# Patient Record
Sex: MALE | Race: WHITE | NOT HISPANIC OR LATINO | Employment: OTHER | URBAN - NONMETROPOLITAN AREA
[De-identification: names, ages, dates, MRNs, and addresses within clinical notes are randomized per-mention and may not be internally consistent; named-entity substitution may affect disease eponyms.]

---

## 2017-01-04 DIAGNOSIS — I10 ESSENTIAL HYPERTENSION: ICD-10-CM

## 2017-01-04 RX ORDER — LISINOPRIL 40 MG/1
40 TABLET ORAL 2 TIMES DAILY
Qty: 60 TABLET | Refills: 0 | Status: SHIPPED | OUTPATIENT
Start: 2017-01-04 | End: 2020-04-27

## 2017-01-04 RX ORDER — AMLODIPINE BESYLATE 10 MG/1
10 TABLET ORAL DAILY
Qty: 30 TABLET | Refills: 0 | Status: SHIPPED | OUTPATIENT
Start: 2017-01-04

## 2017-01-04 RX ORDER — HYDROCHLOROTHIAZIDE 25 MG/1
25 TABLET ORAL DAILY
Qty: 30 TABLET | Refills: 0 | Status: SHIPPED | OUTPATIENT
Start: 2017-01-04

## 2017-01-05 ENCOUNTER — TELEPHONE (OUTPATIENT)
Dept: FAMILY MEDICINE CLINIC | Facility: CLINIC | Age: 52
End: 2017-01-05

## 2017-01-05 NOTE — TELEPHONE ENCOUNTER
Top number too high.  Tell him to limit salt and recheck 2 weeks. If top number still over 140, needs to be seen.

## 2018-09-26 ENCOUNTER — OFFICE VISIT (OUTPATIENT)
Dept: ORTHOPEDIC SURGERY | Facility: CLINIC | Age: 53
End: 2018-09-26

## 2018-09-26 ENCOUNTER — LAB (OUTPATIENT)
Dept: LAB | Facility: HOSPITAL | Age: 53
End: 2018-09-26

## 2018-09-26 VITALS — BODY MASS INDEX: 41.75 KG/M2 | WEIGHT: 315 LBS | HEIGHT: 73 IN

## 2018-09-26 DIAGNOSIS — M25.561 RIGHT KNEE PAIN, UNSPECIFIED CHRONICITY: Primary | ICD-10-CM

## 2018-09-26 DIAGNOSIS — M25.561 RIGHT KNEE PAIN, UNSPECIFIED CHRONICITY: ICD-10-CM

## 2018-09-26 DIAGNOSIS — M25.561 ACUTE PAIN OF RIGHT KNEE: ICD-10-CM

## 2018-09-26 DIAGNOSIS — M25.461 EFFUSION OF RIGHT KNEE: Primary | ICD-10-CM

## 2018-09-26 LAB — CRYSTALS FLD MICRO: NORMAL

## 2018-09-26 PROCEDURE — 20610 DRAIN/INJ JOINT/BURSA W/O US: CPT | Performed by: NURSE PRACTITIONER

## 2018-09-26 PROCEDURE — 87205 SMEAR GRAM STAIN: CPT | Performed by: NURSE PRACTITIONER

## 2018-09-26 PROCEDURE — 87070 CULTURE OTHR SPECIMN AEROBIC: CPT | Performed by: NURSE PRACTITIONER

## 2018-09-26 PROCEDURE — 89060 EXAM SYNOVIAL FLUID CRYSTALS: CPT | Performed by: NURSE PRACTITIONER

## 2018-09-26 PROCEDURE — 87015 SPECIMEN INFECT AGNT CONCNTJ: CPT | Performed by: NURSE PRACTITIONER

## 2018-09-26 PROCEDURE — 99214 OFFICE O/P EST MOD 30 MIN: CPT | Performed by: NURSE PRACTITIONER

## 2018-09-26 RX ADMIN — TRIAMCINOLONE ACETONIDE 40 MG: 40 INJECTION, SUSPENSION INTRA-ARTICULAR; INTRAMUSCULAR at 14:51

## 2018-09-26 RX ADMIN — LIDOCAINE HYDROCHLORIDE 2 ML: 20 INJECTION, SOLUTION INFILTRATION; PERINEURAL at 14:51

## 2018-09-26 NOTE — PROGRESS NOTES
Kurtis Valladares is a 52 y.o. male   Primary provider:  System, Provider Not In       Chief Complaint   Patient presents with   • Right Knee - Pain   • Establish Care       HISTORY OF PRESENT ILLNESS: Patient being seen for right knee pain. X-rays done today. Patient reports no pain when sitting, however pain ranges from 6-9/10 with ambulation.     Pain   The problem occurs constantly. Associated symptoms include joint swelling. Associated symptoms comments: Grinding, stabbing, aching, clicking, swelling. The symptoms are aggravated by standing and walking. He has tried rest for the symptoms.        CONCURRENT MEDICAL HISTORY:    Past Medical History:   Diagnosis Date   • Acute bronchitis    • Asthma    • Dyspnea    • Hypertension    • Pain in lower limb    • Sprain of ankle        No Known Allergies      Current Outpatient Prescriptions:   •  amLODIPine (NORVASC) 10 MG tablet, Take 1 tablet by mouth Daily., Disp: 30 tablet, Rfl: 0  •  DICLOFENAC PO, Take  by mouth., Disp: , Rfl:   •  hydrochlorothiazide (HYDRODIURIL) 25 MG tablet, Take 1 tablet by mouth Daily., Disp: 30 tablet, Rfl: 0  •  lisinopril (PRINIVIL,ZESTRIL) 40 MG tablet, Take 1 tablet by mouth 2 (Two) Times a Day., Disp: 60 tablet, Rfl: 0    Past Surgical History:   Procedure Laterality Date   • OTHER SURGICAL HISTORY  06/18/2005    Arm Surgery (1) -  removal. foreign body right antecubital fossa, penetrating wound       Family History   Problem Relation Age of Onset   • Hypertension Other        Social History     Social History   • Marital status:      Spouse name: N/A   • Number of children: N/A   • Years of education: N/A     Occupational History   • Not on file.     Social History Main Topics   • Smoking status: Former Smoker     Quit date: 12/20/2005   • Smokeless tobacco: Never Used   • Alcohol use 1.2 oz/week     2 Cans of beer per week   • Drug use: No   • Sexual activity: Defer     Other Topics Concern   • Not on file     Social  "History Narrative   • No narrative on file        Review of Systems   Musculoskeletal: Positive for joint swelling.   All other systems reviewed and are negative.      PHYSICAL EXAMINATION:       Ht 185.4 cm (73\")   Wt (!) 161 kg (354 lb)   BMI 46.70 kg/m²     Physical Exam   Constitutional: He is oriented to person, place, and time. Vital signs are normal. He appears well-developed and well-nourished. He is cooperative.   HENT:   Head: Normocephalic and atraumatic.   Neck: Trachea normal and phonation normal.   Pulmonary/Chest: Effort normal. No respiratory distress.   Abdominal: Soft. Normal appearance. He exhibits no distension.   Musculoskeletal:        Right knee: He exhibits effusion.   Neurological: He is alert and oriented to person, place, and time. GCS eye subscore is 4. GCS verbal subscore is 5. GCS motor subscore is 6.   Skin: Skin is warm, dry and intact.   Psychiatric: He has a normal mood and affect. His speech is normal and behavior is normal. Judgment and thought content normal. Cognition and memory are normal.   Vitals reviewed.      GAIT:     []  Normal  [x]  Antalgic    Assistive device: [x]  None  []  Walker     []  Crutches  []  Cane     []  Wheelchair  []  Stretcher    Right Knee Exam     Tenderness   The patient is experiencing tenderness in the medial joint line and lateral joint line.    Range of Motion   Right knee extension: -3.   Right knee flexion: 90.     Other   Erythema: absent  Scars: absent  Sensation: normal  Pulse: present  Swelling: moderate  Other tests: effusion present      Left Knee Exam   Left knee exam is normal.    Muscle Strength     The patient has normal left knee strength.                Large Joint Arthrocentesis  Date/Time: 9/26/2018 2:51 PM  Consent given by: patient  Site marked: site marked  Timeout: Immediately prior to procedure a time out was called to verify the correct patient, procedure, equipment, support staff and site/side marked as required "   Supporting Documentation  Indications: pain   Procedure Details  Location: knee - R knee  Preparation: Patient was prepped and draped in the usual sterile fashion  Needle size: 22 G  Approach: anteromedial  Medications administered: 40 mg triamcinolone acetonide 40 MG/ML; 2 mL lidocaine 2%  Patient tolerance: patient tolerated the procedure well with no immediate complications          Xr Knee 1 Or 2 View Right    Result Date: 9/26/2018  Narrative: Standing AP of both knees with lateral views of the right knee only reveals no fracture dislocation or other acute radiological abnormality noted.  There is moderate to severe amount of medial compartmental degenerative changes noted in the medial compartment.  No comparison views on file. 09/26/18 at 3:30 PM by JESENIA Flannery    Xr Knee Bilateral Ap Standing    Result Date: 9/26/2018  Narrative: Standing AP of both knees with lateral views of the right knee only reveals no fracture dislocation or other acute radiological abnormality noted.  There is moderate to severe amount of medial compartmental degenerative changes noted in the medial compartment.  No comparison views on file. 09/26/18 at 3:30 PM by JESENIA Flannery           ASSESSMENT:    Diagnoses and all orders for this visit:    Effusion of right knee    Right knee pain, unspecified chronicity  -     Large Joint Arthrocentesis  -     Culture, Routine - Aspirate, Knee, Right; Future  -     Crystal Exam, Fluid - Synovial Fluid,; Future  -     Crystal Exam, Fluid - Synovial Fluid,  -     Body Fluid Culture - Aspirate, Knee, Right; Future  -     Cancel: Body Fluid Culture - Aspirate, Knee, Right  -     Body Fluid Culture - Body Fluid, Knee, Right    Other orders  -     DICLOFENAC PO; Take  by mouth.          PLAN  Reviewed the x-rays with the patient and discussed options recommending aspiration of the knee.  Fluid was sent off for an evaluation of a shot of steroids was placed in the knee prior to  discharge.  Patient was instructed modifies activity weightbearing and follow-up in 1 week for recheck unless his pain has dramatically improved and he cut back as needed.  No Follow-up on file.    Bill Pruitt, APRN

## 2018-09-27 RX ORDER — LIDOCAINE HYDROCHLORIDE 20 MG/ML
2 INJECTION, SOLUTION INFILTRATION; PERINEURAL
Status: COMPLETED | OUTPATIENT
Start: 2018-09-26 | End: 2018-09-26

## 2018-09-27 RX ORDER — TRIAMCINOLONE ACETONIDE 40 MG/ML
40 INJECTION, SUSPENSION INTRA-ARTICULAR; INTRAMUSCULAR
Status: COMPLETED | OUTPATIENT
Start: 2018-09-26 | End: 2018-09-26

## 2018-10-03 ENCOUNTER — OFFICE VISIT (OUTPATIENT)
Dept: ORTHOPEDIC SURGERY | Facility: CLINIC | Age: 53
End: 2018-10-03

## 2018-10-03 VITALS — HEIGHT: 73 IN | BODY MASS INDEX: 41.75 KG/M2 | WEIGHT: 315 LBS

## 2018-10-03 DIAGNOSIS — M25.461 EFFUSION OF RIGHT KNEE: Primary | ICD-10-CM

## 2018-10-03 DIAGNOSIS — M25.561 RIGHT KNEE PAIN, UNSPECIFIED CHRONICITY: ICD-10-CM

## 2018-10-03 PROCEDURE — 99213 OFFICE O/P EST LOW 20 MIN: CPT | Performed by: NURSE PRACTITIONER

## 2018-10-03 NOTE — PROGRESS NOTES
"Kurtis Valladares is a 52 y.o. male returns for     Chief Complaint   Patient presents with   • Right Knee - Follow-up       HISTORY OF PRESENT ILLNESS: Patient being seen for right knee follow up. Patient states pain has increased and is disturbing sleep.        CONCURRENT MEDICAL HISTORY:    The following portions of the patient's history were reviewed and updated as appropriate: allergies, current medications, past family history, past medical history, past social history, past surgical history and problem list.     ROS  No fevers or chills.  No chest pain or shortness of air.  No GI or  disturbances.    PHYSICAL EXAMINATION:       Ht 185.4 cm (73\")   Wt (!) 159 kg (351 lb 3.2 oz)   BMI 46.34 kg/m²     Physical Exam   Constitutional: He is oriented to person, place, and time. Vital signs are normal. He appears well-developed and well-nourished. He is cooperative.   HENT:   Head: Normocephalic and atraumatic.   Neck: Trachea normal and phonation normal.   Pulmonary/Chest: Effort normal. No respiratory distress.   Abdominal: Soft. Normal appearance. He exhibits no distension.   Musculoskeletal:        Right knee: He exhibits effusion.   Neurological: He is alert and oriented to person, place, and time. GCS eye subscore is 4. GCS verbal subscore is 5. GCS motor subscore is 6.   Skin: Skin is warm, dry and intact.   Psychiatric: He has a normal mood and affect. His speech is normal and behavior is normal. Judgment and thought content normal. Cognition and memory are normal.   Vitals reviewed.      GAIT:     [x]  Normal  []  Antalgic    Assistive device: [x]  None  []  Walker     []  Crutches  []  Cane     []  Wheelchair  []  Stretcher    Right Knee Exam     Tenderness   The patient is experiencing tenderness in the medial joint line and pes anserinus.    Range of Motion   Extension: normal   Right knee flexion: 90.     Tests   Sofiya:  Medial - positive Lateral - positive    Other   Erythema: absent  Scars: " absent  Sensation: normal  Pulse: present  Swelling: mild  Other tests: effusion present      Left Knee Exam   Left knee exam is normal.    Muscle Strength     The patient has normal left knee strength.              Xr Knee 1 Or 2 View Right    Result Date: 9/26/2018  Narrative: Standing AP of both knees with lateral views of the right knee only reveals no fracture dislocation or other acute radiological abnormality noted.  There is moderate to severe amount of medial compartmental degenerative changes noted in the medial compartment.  No comparison views on file. 09/26/18 at 3:30 PM by JESENIA Flannery    Xr Knee Bilateral Ap Standing    Result Date: 9/26/2018  Narrative: Standing AP of both knees with lateral views of the right knee only reveals no fracture dislocation or other acute radiological abnormality noted.  There is moderate to severe amount of medial compartmental degenerative changes noted in the medial compartment.  No comparison views on file. 09/26/18 at 3:30 PM by JESENIA Flannery             ASSESSMENT:    Diagnoses and all orders for this visit:    Effusion of right knee  -     MRI Knee Right Without Contrast; Future    Right knee pain, unspecified chronicity  -     MRI Knee Right Without Contrast; Future          PLAN  Recommend MRI of the right knee for further evaluation pain which is failed to improve despite aspiration and injection 2 weeks ago.  No Follow-up on file.    JESENIA Flannery

## 2018-10-04 DIAGNOSIS — M25.461 EFFUSION OF RIGHT KNEE: ICD-10-CM

## 2018-10-04 DIAGNOSIS — M25.561 RIGHT KNEE PAIN, UNSPECIFIED CHRONICITY: ICD-10-CM

## 2018-10-05 ENCOUNTER — OFFICE VISIT (OUTPATIENT)
Dept: ORTHOPEDIC SURGERY | Facility: CLINIC | Age: 53
End: 2018-10-05

## 2018-10-05 VITALS — HEIGHT: 73 IN | WEIGHT: 315 LBS | BODY MASS INDEX: 41.75 KG/M2

## 2018-10-05 DIAGNOSIS — M25.561 RIGHT KNEE PAIN, UNSPECIFIED CHRONICITY: ICD-10-CM

## 2018-10-05 DIAGNOSIS — M17.11 PRIMARY OSTEOARTHRITIS OF RIGHT KNEE: ICD-10-CM

## 2018-10-05 DIAGNOSIS — T14.8XXA BONE BRUISE: Primary | ICD-10-CM

## 2018-10-05 PROCEDURE — 99213 OFFICE O/P EST LOW 20 MIN: CPT | Performed by: NURSE PRACTITIONER

## 2018-10-05 NOTE — PROGRESS NOTES
"Kurtis Valladares is a 52 y.o. male returns for     Chief Complaint   Patient presents with   • Right Knee - Follow-up, Pain   • Results       HISTORY OF PRESENT ILLNESS:     52-year-old  male in the office today for follow-up evaluation of his right knee pain.  He's obtain MRI as directed and he reports her some improvement in the pain when he doubled his dose of diclofenac.     CONCURRENT MEDICAL HISTORY:    The following portions of the patient's history were reviewed and updated as appropriate: allergies, current medications, past family history, past medical history, past social history, past surgical history and problem list.     ROS  No fevers or chills.  No chest pain or shortness of air.  No GI or  disturbances.    PHYSICAL EXAMINATION:       Ht 185.4 cm (73\")   Wt (!) 159 kg (350 lb)   BMI 46.18 kg/m²     Physical Exam   Constitutional: He is oriented to person, place, and time. Vital signs are normal. He appears well-developed and well-nourished. He is cooperative.   HENT:   Head: Normocephalic and atraumatic.   Neck: Trachea normal and phonation normal.   Pulmonary/Chest: Effort normal. No respiratory distress.   Abdominal: Soft. Normal appearance. He exhibits no distension.   Musculoskeletal:        Right knee: He exhibits effusion.   Neurological: He is alert and oriented to person, place, and time. GCS eye subscore is 4. GCS verbal subscore is 5. GCS motor subscore is 6.   Skin: Skin is warm, dry and intact.   Psychiatric: He has a normal mood and affect. His speech is normal and behavior is normal. Judgment and thought content normal. Cognition and memory are normal.   Vitals reviewed.      GAIT:     []  Normal  [x]  Antalgic    Assistive device: [x]  None  []  Walker     []  Crutches  []  Cane     []  Wheelchair  []  Stretcher    Right Knee Exam     Tenderness   The patient is experiencing tenderness in the medial joint line and pes anserinus.    Range of Motion   Extension: normal "   Right knee flexion: 90.     Tests   Sofiya:  Medial - positive Lateral - positive    Other   Erythema: absent  Scars: absent  Sensation: normal  Pulse: present  Swelling: mild  Other tests: effusion present      Left Knee Exam   Left knee exam is normal.    Muscle Strength     The patient has normal left knee strength.              Xr Knee 1 Or 2 View Right    Result Date: 9/26/2018  Narrative: Standing AP of both knees with lateral views of the right knee only reveals no fracture dislocation or other acute radiological abnormality noted.  There is moderate to severe amount of medial compartmental degenerative changes noted in the medial compartment.  No comparison views on file. 09/26/18 at 3:30 PM by JESENIA Flannery    Xr Knee Bilateral Ap Standing    Result Date: 9/26/2018  Narrative: Standing AP of both knees with lateral views of the right knee only reveals no fracture dislocation or other acute radiological abnormality noted.  There is moderate to severe amount of medial compartmental degenerative changes noted in the medial compartment.  No comparison views on file. 09/26/18 at 3:30 PM by JESENIA Flannery             MRI knee right without [id number]  [facility]  Result Impression         Findings of advanced chronic osteoarthritis with marked cartilage loss especially in medial and patellofemoral compartments    Abnormal marrow edema medial tibial plateau likely indicating bone bruising if the patient has had an injury. This could be a source of pain. It could just be reactive from the arthritis though I think this is less likely because it is rather extensive    Extensive degenerative signal involving medial meniscal body.    No other internal derangement detected    [id number]   Result Narrative   MRI RIGHT KNEE:    HISTORY: Provider note: Right knee pain, unspecified chronicity. Information obtained by the technologist: Pain medial and posterior right knee for several  "weeks. Feels a popping at times. Swelling. No known injury.    TECHNIQUE:  GE 1.5T. Multiplanar multisequence imaging including sequences with and without fat saturation.    Findings: There is a large joint effusion without evident loose body. ACL and PCL look intact as do medial collateral ligament, iliotibial band, fibular collateral ligament, biceps femoris tendon.    There is advanced generalized osteoarthritis, with marked cartilage loss in the patellofemoral space, and almost complete cartilage loss on the medial femoral condyle. There is generalized significant cartilage thinning everywhere else. There is moderate   marrow edema in the subcortical marrow at medial tibial plateau. No discrete fracture line is visible. If there has been recent injury this likely represents  \"bone bruising\". If not, it is more likely just reactive from arthritis.    No fracture or other marrow lesion is visible otherwise.    Menisci show advanced degenerative signal in the body of medial meniscus, with attenuation of the free margin of medial meniscal body secondary to osteoarthritis. The body of medial meniscus is extruded medially. Medial and lateral meniscus otherwise   show no tear or displaced flap or separation from insertion sites.    Quadriceps and patellar tendons look normal.   Other Result Information   Steven, Rad Results In - 10/04/2018 11:17 AM CDT  MRI RIGHT KNEE:    HISTORY: Provider note: Right knee pain, unspecified chronicity. Information obtained by the technologist: Pain medial and posterior right knee for several weeks. Feels a popping at times. Swelling. No known injury.    TECHNIQUE:  GE 1.5T. Multiplanar multisequence imaging including sequences with and without fat saturation.    Findings: There is a large joint effusion without evident loose body. ACL and PCL look intact as do medial collateral ligament, iliotibial band, fibular collateral ligament, biceps femoris tendon.    There is advanced generalized " "osteoarthritis, with marked cartilage loss in the patellofemoral space, and almost complete cartilage loss on the medial femoral condyle. There is generalized significant cartilage thinning everywhere else. There is moderate   marrow edema in the subcortical marrow at medial tibial plateau. No discrete fracture line is visible. If there has been recent injury this likely represents  \"bone bruising\". If not, it is more likely just reactive from arthritis.    No fracture or other marrow lesion is visible otherwise.    Menisci show advanced degenerative signal in the body of medial meniscus, with attenuation of the free margin of medial meniscal body secondary to osteoarthritis. The body of medial meniscus is extruded medially. Medial and lateral meniscus otherwise   show no tear or displaced flap or separation from insertion sites.    Quadriceps and patellar tendons look normal.    IMPRESSION:       Findings of advanced chronic osteoarthritis with marked cartilage loss especially in medial and patellofemoral compartments    Abnormal marrow edema medial tibial plateau likely indicating bone bruising if the patient has had an injury. This could be a source of pain. It could just be reactive from the arthritis though I think this is less likely because it is rather extensive    Extensive degenerative signal involving medial meniscal body.    No other internal derangement detected    [id number]         ASSESSMENT:    Diagnoses and all orders for this visit:    Bone bruise    Right knee pain, unspecified chronicity    Primary osteoarthritis of right knee          PLAN  Bone bruising noted in the medial plateau on the MRI today which was discussed with patient in detail and I recommended modified weightbearing with a cane, continued as prescribed dosing of the diclofenac supplemented with Prilosec and Tylenol 3 times a day to go with the diclofenac.  Patient verbalizes understanding the discharge instructions follow-up " in 1 month for recheck.  No Follow-up on file.    Bill Pruitt, APRN

## 2018-10-22 LAB
BACTERIA FLD CULT: ABNORMAL
GRAM STN SPEC: ABNORMAL
GRAM STN SPEC: ABNORMAL

## 2018-11-01 ENCOUNTER — OFFICE VISIT (OUTPATIENT)
Dept: ORTHOPEDIC SURGERY | Facility: CLINIC | Age: 53
End: 2018-11-01

## 2018-11-01 ENCOUNTER — TELEPHONE (OUTPATIENT)
Dept: ORTHOPEDIC SURGERY | Facility: CLINIC | Age: 53
End: 2018-11-01

## 2018-11-01 VITALS — BODY MASS INDEX: 41.75 KG/M2 | WEIGHT: 315 LBS | HEIGHT: 73 IN

## 2018-11-01 DIAGNOSIS — M25.561 RIGHT KNEE PAIN, UNSPECIFIED CHRONICITY: ICD-10-CM

## 2018-11-01 DIAGNOSIS — M25.461 EFFUSION OF RIGHT KNEE: ICD-10-CM

## 2018-11-01 DIAGNOSIS — M17.11 PRIMARY OSTEOARTHRITIS OF RIGHT KNEE: Primary | ICD-10-CM

## 2018-11-01 DIAGNOSIS — T14.8XXA BONE BRUISE: ICD-10-CM

## 2018-11-01 PROCEDURE — 99213 OFFICE O/P EST LOW 20 MIN: CPT | Performed by: NURSE PRACTITIONER

## 2018-11-01 RX ORDER — AMOXICILLIN 500 MG/1
500 TABLET, FILM COATED ORAL EVERY 8 HOURS SCHEDULED
Qty: 30 TABLET | Refills: 0 | Status: SHIPPED | OUTPATIENT
Start: 2018-11-01 | End: 2018-11-26

## 2018-11-01 NOTE — TELEPHONE ENCOUNTER
Called to notify patient of lab results. He states he still has some significant pain, no swelling, and limited mobility. He states his bruise is getting better. He would like to make an appointment to be seen this week.

## 2018-11-01 NOTE — PROGRESS NOTES
"Kurtis Valladares is a 52 y.o. male returns for     Chief Complaint   Patient presents with   • Right Knee - Follow-up       HISTORY OF PRESENT ILLNESS:     52-year-old  male has returned to the office today for follow-up evaluation of knee pain.  He reports some improvement in his symptoms since previous evaluation.  He denies any fever, chills, erythema however continues to experience swelling in both knees which is worse on the right.     CONCURRENT MEDICAL HISTORY:    The following portions of the patient's history were reviewed and updated as appropriate: allergies, current medications, past family history, past medical history, past social history, past surgical history and problem list.     ROS  No fevers or chills.  No chest pain or shortness of air.  No GI or  disturbances.    PHYSICAL EXAMINATION:       Ht 185.4 cm (73\")   Wt (!) 161 kg (354 lb)   BMI 46.70 kg/m²     Physical Exam   Constitutional: He is oriented to person, place, and time. Vital signs are normal. He appears well-developed and well-nourished. He is cooperative.   HENT:   Head: Normocephalic and atraumatic.   Neck: Trachea normal and phonation normal.   Pulmonary/Chest: Effort normal. No respiratory distress.   Abdominal: Soft. Normal appearance. He exhibits no distension.   Musculoskeletal:        Right knee: He exhibits effusion.   Neurological: He is alert and oriented to person, place, and time. GCS eye subscore is 4. GCS verbal subscore is 5. GCS motor subscore is 6.   Skin: Skin is warm, dry and intact. Capillary refill takes less than 2 seconds.   Psychiatric: He has a normal mood and affect. His speech is normal and behavior is normal. Judgment and thought content normal. Cognition and memory are normal.   Vitals reviewed.      GAIT:     [x]  Normal  []  Antalgic    Assistive device: [x]  None  []  Walker     []  Crutches  []  Cane     []  Wheelchair  []  Stretcher    Right Knee Exam     Tenderness   The patient is " experiencing tenderness in the medial joint line and pes anserinus.    Range of Motion   Extension: normal   Flexion: abnormal Right knee flexion: 115.     Other   Erythema: absent  Scars: absent  Sensation: normal  Pulse: present  Swelling: mild  Other tests: effusion present      Left Knee Exam     Tenderness   The patient is experiencing tenderness in the pes anserinus and medial joint line.    Range of Motion   Extension: normal   Flexion: abnormal Left knee flexion: 115.               No results found.          ASSESSMENT:    Diagnoses and all orders for this visit:    Primary osteoarthritis of right knee    Right knee pain, unspecified chronicity    Bone bruise    Effusion of right knee    Other orders  -     amoxicillin (AMOXIL) 500 MG tablet; Take 1 tablet by mouth Every 8 (Eight) Hours.          PLAN  I reviewed the previous lab results from the fluid analysis which route of bacteria susceptible to penicillins.  We will recommend patient received a course of Amoxil even though he does not have overt symptoms of joint and a joint infection.  Follow-up in 2 weeks for recheck unless symptoms worsen      Bill Pruitt, JESENIA

## 2018-11-05 ENCOUNTER — OFFICE VISIT (OUTPATIENT)
Dept: ORTHOPEDIC SURGERY | Facility: CLINIC | Age: 53
End: 2018-11-05

## 2018-11-05 ENCOUNTER — LAB (OUTPATIENT)
Dept: LAB | Facility: HOSPITAL | Age: 53
End: 2018-11-05

## 2018-11-05 VITALS — HEIGHT: 73 IN | BODY MASS INDEX: 41.75 KG/M2 | WEIGHT: 315 LBS

## 2018-11-05 DIAGNOSIS — M17.11 PRIMARY OSTEOARTHRITIS OF RIGHT KNEE: ICD-10-CM

## 2018-11-05 DIAGNOSIS — M25.461 EFFUSION OF RIGHT KNEE: ICD-10-CM

## 2018-11-05 DIAGNOSIS — M25.561 RIGHT KNEE PAIN, UNSPECIFIED CHRONICITY: ICD-10-CM

## 2018-11-05 DIAGNOSIS — M17.11 PRIMARY OSTEOARTHRITIS OF RIGHT KNEE: Primary | ICD-10-CM

## 2018-11-05 PROCEDURE — 87070 CULTURE OTHR SPECIMN AEROBIC: CPT

## 2018-11-05 PROCEDURE — 87205 SMEAR GRAM STAIN: CPT

## 2018-11-05 PROCEDURE — 99214 OFFICE O/P EST MOD 30 MIN: CPT | Performed by: NURSE PRACTITIONER

## 2018-11-05 PROCEDURE — 87015 SPECIMEN INFECT AGNT CONCNTJ: CPT

## 2018-11-05 PROCEDURE — 20610 DRAIN/INJ JOINT/BURSA W/O US: CPT | Performed by: NURSE PRACTITIONER

## 2018-11-05 PROCEDURE — 89051 BODY FLUID CELL COUNT: CPT

## 2018-11-05 NOTE — PROGRESS NOTES
Kurtis Valladares is a 52 y.o. male returns for     Chief Complaint   Patient presents with   • Right Knee - Follow-up       HISTORY OF PRESENT ILLNESS:     52-year-old  male in today for follow-up evaluation of his knee pain.  He reports that he was doing well and the pain is almost resolved after started taking the amoxicillin however Saturday he performed a lot of walking and working and since then he's developed a new knee effusion.  He denies any fever chills redness or evidence of sepsis of the knee and/or systemically.,        CONCURRENT MEDICAL HISTORY:    The following portions of the patient's history were reviewed and updated as appropriate: allergies, current medications, past family history, past medical history, past social history, past surgical history and problem list.     ROS  No fevers or chills.  No chest pain or shortness of air.  No GI or  disturbances.    PHYSICAL EXAMINATION:       There were no vitals taken for this visit.    Physical Exam   Constitutional: He is oriented to person, place, and time. Vital signs are normal. He appears well-developed and well-nourished. He is cooperative.   HENT:   Head: Normocephalic and atraumatic.   Neck: Trachea normal and phonation normal.   Pulmonary/Chest: Effort normal. No respiratory distress.   Abdominal: Soft. Normal appearance. He exhibits no distension.   Musculoskeletal:        Right knee: He exhibits effusion.   Neurological: He is alert and oriented to person, place, and time. GCS eye subscore is 4. GCS verbal subscore is 5. GCS motor subscore is 6.   Skin: Skin is warm, dry and intact. Capillary refill takes less than 2 seconds.   Psychiatric: He has a normal mood and affect. His speech is normal and behavior is normal. Judgment and thought content normal. Cognition and memory are normal.   Vitals reviewed.        GAIT:     []  Normal  [x]  Antalgic    Assistive device: []  None  []  Walker     []  Crutches  []  Cane     []   Wheelchair  []  Stretcher    Right Knee Exam     Tenderness   The patient is experiencing tenderness in the medial joint line and pes anserinus.    Range of Motion   Extension: normal   Right knee flexion: 90.     Tests   Sofiya:  Medial - positive Lateral - positive    Other   Erythema: absent  Scars: absent  Sensation: normal  Pulse: present  Swelling: mild  Other tests: effusion present      Left Knee Exam   Left knee exam is normal.    Muscle Strength     The patient has normal left knee strength.            Large Joint Arthrocentesis  Date/Time: 11/5/2018 10:46 AM  Consent given by: patient  Site marked: site marked  Timeout: Immediately prior to procedure a time out was called to verify the correct patient, procedure, equipment, support staff and site/side marked as required   Supporting Documentation  Indications: pain   Procedure Details  Location: knee - R knee  Preparation: Patient was prepped and draped in the usual sterile fashion  Needle size: 18 G  Approach: lateral  Aspirate amount: 65 mL  Aspirate: yellow and clear  Analysis: fluid sample sent for laboratory analysis  Patient tolerance: patient tolerated the procedure well with no immediate complications        No results found.          ASSESSMENT:    Diagnoses and all orders for this visit:    Right knee pain, unspecified chronicity    Primary osteoarthritis of right knee    Effusion of right knee          PLAN  Aspirated the knee today and repeated the culture and sensitivity along with cell count.  I instructed the patient to continue to take the amoxicillin however I still feel that this primarily as an osteoarthritis exacerbation.  Patient will follow-up in 1-2 weeks for recheck  No Follow-up on file.    lEisabeth Edge MA

## 2018-11-08 LAB
APPEARANCE FLD: CLEAR
COLOR FLD: YELLOW
MONOS+MACROS NFR FLD: 76 %
NEUTROPHILS NFR FLD MANUAL: 24 %
RBC # FLD AUTO: 37 /MM3 (ref 0–0)
SPECIMEN VOL FLD: 30 ML
WBC # FLD: 117 /MM3 (ref 0–5)

## 2018-11-19 LAB
BACTERIA FLD CULT: NORMAL
GRAM STN SPEC: NORMAL
GRAM STN SPEC: NORMAL

## 2018-11-26 ENCOUNTER — OFFICE VISIT (OUTPATIENT)
Dept: ORTHOPEDIC SURGERY | Facility: CLINIC | Age: 53
End: 2018-11-26

## 2018-11-26 VITALS — WEIGHT: 315 LBS | HEIGHT: 73 IN | BODY MASS INDEX: 41.75 KG/M2

## 2018-11-26 DIAGNOSIS — T14.8XXA BONE BRUISE: ICD-10-CM

## 2018-11-26 DIAGNOSIS — E66.01 MORBID OBESITY WITH BMI OF 45.0-49.9, ADULT (HCC): ICD-10-CM

## 2018-11-26 DIAGNOSIS — M25.561 RIGHT KNEE PAIN, UNSPECIFIED CHRONICITY: ICD-10-CM

## 2018-11-26 DIAGNOSIS — M25.461 EFFUSION OF RIGHT KNEE: ICD-10-CM

## 2018-11-26 DIAGNOSIS — M17.11 PRIMARY OSTEOARTHRITIS OF RIGHT KNEE: Primary | ICD-10-CM

## 2018-11-26 PROCEDURE — 96372 THER/PROPH/DIAG INJ SC/IM: CPT | Performed by: NURSE PRACTITIONER

## 2018-11-26 PROCEDURE — 99214 OFFICE O/P EST MOD 30 MIN: CPT | Performed by: NURSE PRACTITIONER

## 2018-11-26 RX ORDER — TRIAMCINOLONE ACETONIDE 40 MG/ML
80 INJECTION, SUSPENSION INTRA-ARTICULAR; INTRAMUSCULAR ONCE
Status: COMPLETED | OUTPATIENT
Start: 2018-11-26 | End: 2018-11-26

## 2018-11-26 RX ADMIN — TRIAMCINOLONE ACETONIDE 80 MG: 40 INJECTION, SUSPENSION INTRA-ARTICULAR; INTRAMUSCULAR at 15:08

## 2018-11-26 NOTE — PROGRESS NOTES
"Kurtis Valladares is a 53 y.o. male returns for     Chief Complaint   Patient presents with   • Right Knee - Follow-up       HISTORY OF PRESENT ILLNESS:     53-year-old  male in the office today for follow-up evaluation of right knee pain.  He has reported that he is finished the antibiotics as directed and his knee pain has continued to improve since last evaluation.  He also reports that he has continued pain that waxes and wanes depending on his activity level.  He denies any fever chills or evidence of sepsis     CONCURRENT MEDICAL HISTORY:    The following portions of the patient's history were reviewed and updated as appropriate: allergies, current medications, past family history, past medical history, past social history, past surgical history and problem list.     ROS  No fevers or chills.  No chest pain or shortness of air.  No GI or  disturbances.    PHYSICAL EXAMINATION:       Ht 185.4 cm (73\")   Wt (!) 160 kg (352 lb)   BMI 46.44 kg/m²     Physical Exam   Constitutional: He is oriented to person, place, and time. Vital signs are normal. He appears well-developed and well-nourished. He is cooperative.   HENT:   Head: Normocephalic and atraumatic.   Neck: Trachea normal and phonation normal.   Pulmonary/Chest: Effort normal. No respiratory distress.   Abdominal: Soft. Normal appearance. He exhibits no distension.   Musculoskeletal:        Right knee: He exhibits effusion.   Neurological: He is alert and oriented to person, place, and time. GCS eye subscore is 4. GCS verbal subscore is 5. GCS motor subscore is 6.   Skin: Skin is warm, dry and intact. Capillary refill takes less than 2 seconds.   Psychiatric: He has a normal mood and affect. His speech is normal and behavior is normal. Judgment and thought content normal. Cognition and memory are normal.   Vitals reviewed.      GAIT:     [x]  Normal  []  Antalgic    Assistive device: [x]  None  []  Walker     []  Crutches  []  Cane     []  " Wheelchair  []  Stretcher    Right Knee Exam     Tenderness   The patient is experiencing tenderness in the medial joint line and pes anserinus.    Range of Motion   Extension: normal   Right knee flexion: 115.     Tests   Sofiya:  Medial - positive Lateral - positive    Other   Erythema: absent  Scars: absent  Sensation: normal  Pulse: present  Swelling: mild  Effusion: effusion present      Left Knee Exam   Left knee exam is normal.    Muscle Strength   The patient has normal left knee strength.              No results found.          ASSESSMENT:    Diagnoses and all orders for this visit:    Primary osteoarthritis of right knee  -     triamcinolone acetonide (KENALOG-40) injection 80 mg; Inject 2 mL into the appropriate muscle as directed by prescriber 1 (One) Time.    Right knee pain, unspecified chronicity  -     triamcinolone acetonide (KENALOG-40) injection 80 mg; Inject 2 mL into the appropriate muscle as directed by prescriber 1 (One) Time.    Effusion of right knee  -     triamcinolone acetonide (KENALOG-40) injection 80 mg; Inject 2 mL into the appropriate muscle as directed by prescriber 1 (One) Time.    Bone bruise  -     triamcinolone acetonide (KENALOG-40) injection 80 mg; Inject 2 mL into the appropriate muscle as directed by prescriber 1 (One) Time.    Morbid obesity with BMI of 45.0-49.9, adult (CMS/[facility])          PLAN  End-stage osteoarthritis of the right knee continues to cause patient waxing and waning pain with the effusion is improved since the last evaluation.  Repeated culture and sensitivity are negative for any infection and the patient doesn't seem to be experiencing any overt evidence of infection.  We'll recommend follow-up with Dr. Newman and Dr. Benavidez for further evaluation of his knee pain, end stage osteoarthritis to discuss the possibility of proceeding with a total knee arthroplasty once he is ready.  In the meantime the patient needs to focus on aggressive weight reduction,  low-impact exercises and modified weightbearing with use of a cane as needed.    No Follow-up on file.    Bill Pruitt, APRN

## 2019-01-07 ENCOUNTER — OFFICE VISIT (OUTPATIENT)
Dept: ORTHOPEDIC SURGERY | Facility: CLINIC | Age: 54
End: 2019-01-07

## 2019-01-07 ENCOUNTER — TELEPHONE (OUTPATIENT)
Dept: ORTHOPEDIC SURGERY | Facility: CLINIC | Age: 54
End: 2019-01-07

## 2019-01-07 VITALS — HEIGHT: 73 IN | BODY MASS INDEX: 41.75 KG/M2 | WEIGHT: 315 LBS

## 2019-01-07 DIAGNOSIS — M17.11 PRIMARY OSTEOARTHRITIS OF RIGHT KNEE: ICD-10-CM

## 2019-01-07 DIAGNOSIS — M25.461 EFFUSION OF RIGHT KNEE: ICD-10-CM

## 2019-01-07 DIAGNOSIS — M25.561 RIGHT KNEE PAIN, UNSPECIFIED CHRONICITY: Primary | ICD-10-CM

## 2019-01-07 PROCEDURE — 99213 OFFICE O/P EST LOW 20 MIN: CPT | Performed by: ORTHOPAEDIC SURGERY

## 2019-01-07 RX ORDER — ASPIRIN 325 MG
325 TABLET ORAL DAILY
COMMUNITY
End: 2019-11-07 | Stop reason: SDUPTHER

## 2019-01-07 NOTE — PROGRESS NOTES
"Kurits Valladares is a 53 y.o. male returns for     Chief Complaint   Patient presents with   • Right Knee - Follow-up       HISTORY OF PRESENT ILLNESS: f/u on right knee pain per carolyne and wants to discuss surgery on the right knee, patient brought the mri disc and report with him, is been treated by Bill has had drainage and injection of steroid in the past.  The problem is recurred it comes back.  He doesn't really have mechanical symptoms of buckling catching giving way is been going on for a year.  He is taken some anti-inflammatories as well.  He is very active at work as a [employer].  He is on his feet a lot.  He is somewhat worse with activity better with rest.       CONCURRENT MEDICAL HISTORY:    The following portions of the patient's history were reviewed and updated as appropriate: allergies, current medications, past family history, past medical history, past social history, past surgical history and problem list.     ROS  No fevers or chills.  No chest pain or shortness of air.  No GI or  disturbances.    PHYSICAL EXAMINATION:       Ht 185.4 cm (73\")   Wt (!) 160 kg (352 lb)   BMI 46.44 kg/m²     Physical Exam   Constitutional: He is oriented to person, place, and time. He appears well-developed.   HENT:   Head: Normocephalic and atraumatic.   Eyes: EOM are normal. Pupils are equal, round, and reactive to light.   Neck: Neck supple. No tracheal deviation present.   Pulmonary/Chest: Effort normal.   Musculoskeletal: He exhibits tenderness. He exhibits no edema or deformity.   Neurological: He is alert and oriented to person, place, and time.   Skin: Skin is warm and dry. No erythema.   Psychiatric: He has a normal mood and affect.   Vitals reviewed.      GAIT:     [x]  Normal  []  Antalgic    Assistive device: []  None  []  Walker     []  Crutches  []  Cane     []  Wheelchair  []  Stretcher     Ortho Exam   Tender medial joint line tenderness patella there is 1+ effusion on the right " knee.  Motion otherwise fairly well preserved he is neurologically intact.  Calf is otherwise negative.    No results found.     x-rays and MRI scan are reviewed which shows DJD.  Medial compartment as well as patellofemoral compartment.  Edema is noted medially.        ASSESSMENT:    Diagnoses and all orders for this visit:    Right knee pain, unspecified chronicity    Primary osteoarthritis of right knee    Effusion of right knee    Other orders  -     aspirin (MAZIN ASPIRIN) 325 MG tablet; Take 325 mg by mouth Daily.          PLAN  we discussed options here today.  He's been managed well with conservative standpoint.  I would consider Visco supplementation and spent quite a while discussing this with him.  I've also discussed the fact that BMI has been discussed with him and he is 46.4 which is heavy.  He's drop some weight going forward.  I have explained to him this increases the complication rate anything about this.  He really has no mechanical symptoms and I don't think will respond well.  This problem and joint for 6 months continue to try conservative care.  We'll see him back with the Visco supplementation which I believe it's medically necessary at this point    No Follow-up on file.    Collin Benavidez MD

## 2019-01-09 ENCOUNTER — CLINICAL SUPPORT (OUTPATIENT)
Dept: ORTHOPEDIC SURGERY | Facility: CLINIC | Age: 54
End: 2019-01-09

## 2019-01-09 VITALS — BODY MASS INDEX: 41.75 KG/M2 | HEIGHT: 73 IN | WEIGHT: 315 LBS

## 2019-01-09 DIAGNOSIS — M17.11 PRIMARY OSTEOARTHRITIS OF RIGHT KNEE: Primary | ICD-10-CM

## 2019-01-09 PROCEDURE — 20610 DRAIN/INJ JOINT/BURSA W/O US: CPT | Performed by: ORTHOPAEDIC SURGERY

## 2019-01-09 NOTE — PROGRESS NOTES
"Knee Joint Injection      Patient: Kurtis Valladares    YOB: 1965    Chief Complaint   Patient presents with   • Injections     Right knee-Synvisc One       History of Present Illness:  Patient is here for an injection.  No new complaints.    Physical Exam: 53 y.o. male  General Appearance:    Alert, cooperative, in no acute distress                   Vitals:    01/09/19 0921   Weight: (!) 160 kg (352 lb)   Height: 185.4 cm (73\")   PainSc: 0-No pain        Patient is alert and oriented, ×3 no acute distress.  Affect is normal respiratory rate is normal unlabored.  Exam and complaints are unchanged.    Procedure:  Large Joint Arthrocentesis: R knee  Date/Time: 1/9/2019 9:22 AM  Consent given by: patient  Timeout: Immediately prior to procedure a time out was called to verify the correct patient, procedure, equipment, support staff and site/side marked as required   Supporting Documentation  Indications: pain   Procedure Details  Location: knee - R knee  Preparation: Patient was prepped and draped in the usual sterile fashion  Needle size: 22 G  Approach: anteromedial  Medications administered: 48 mg hylan 48 MG/6ML  Patient tolerance: patient tolerated the procedure well with no immediate complications          Assessment:    Diagnoses and all orders for this visit:    Primary osteoarthritis of right knee  -     Large Joint Arthrocentesis: R knee        Plan:   Slowly increase activity as tolerated.  Discussed importance of leg strengthening and general conditioning.  Discussed warning signs of injection.  Discussed that purpose of injections was symptom improvement and improved activity.  Also discussed that further treatment options depended on symptoms at followup and length of time of improvement after injections.    No Follow-up on file.    Collin Benavidez MD  "

## 2019-03-08 DIAGNOSIS — M79.671 PAIN OF RIGHT HEEL: Primary | ICD-10-CM

## 2019-03-11 ENCOUNTER — OFFICE VISIT (OUTPATIENT)
Dept: ORTHOPEDIC SURGERY | Facility: CLINIC | Age: 54
End: 2019-03-11

## 2019-03-11 VITALS — BODY MASS INDEX: 41.75 KG/M2 | WEIGHT: 315 LBS | HEIGHT: 73 IN

## 2019-03-11 DIAGNOSIS — M77.51 POSTCALCANEAL BURSITIS OF RIGHT FOOT: ICD-10-CM

## 2019-03-11 DIAGNOSIS — M79.671 PAIN OF RIGHT HEEL: Primary | ICD-10-CM

## 2019-03-11 PROCEDURE — 99213 OFFICE O/P EST LOW 20 MIN: CPT | Performed by: ORTHOPAEDIC SURGERY

## 2019-03-11 NOTE — PROGRESS NOTES
Kurtis Valladares is a 53 y.o. male   Primary provider:  Provider, No Known       Chief Complaint   Patient presents with   • Right Foot - Pain, Numbness       HISTORY OF PRESENT ILLNESS: new problem with right  heel pain, patient had xrays done today, is been going on now for about 9 months.  He has had several shots of Kenalog in the hip seem to help him for about 3 months but it still is coming back.  He noticed it one day after wearing a pair of soft shoes.  He is better barefoot hurts more an issue.  He has been doing some stretching from a physical therapist that he knows.  He has had to change his job because of this pain.  He is complaining of some numbness in the second and third toe on the right side which is intermittent.    Pain   This is a chronic problem. The current episode started more than 1 month ago. The problem occurs constantly. Associated symptoms include joint swelling and numbness. Pertinent negatives include no abdominal pain, chest pain, chills, fever, nausea, rash or vomiting. The symptoms are aggravated by walking. He has tried rest for the symptoms. The treatment provided no relief.        CONCURRENT MEDICAL HISTORY:    Past Medical History:   Diagnosis Date   • Acute bronchitis    • Asthma    • Dyspnea    • Hypertension    • Pain in lower limb    • Sprain of ankle        No Known Allergies      Current Outpatient Medications:   •  amLODIPine (NORVASC) 10 MG tablet, Take 1 tablet by mouth Daily., Disp: 30 tablet, Rfl: 0  •  aspirin (MAZIN ASPIRIN) 325 MG tablet, Take 325 mg by mouth Daily., Disp: , Rfl:   •  DICLOFENAC PO, Take 50 mg by mouth., Disp: , Rfl:   •  hydrochlorothiazide (HYDRODIURIL) 25 MG tablet, Take 1 tablet by mouth Daily., Disp: 30 tablet, Rfl: 0  •  lisinopril (PRINIVIL,ZESTRIL) 40 MG tablet, Take 1 tablet by mouth 2 (Two) Times a Day., Disp: 60 tablet, Rfl: 0    Past Surgical History:   Procedure Laterality Date   • OTHER SURGICAL HISTORY  06/18/2005    Arm Surgery  "(1) -  removal. foreign body right antecubital fossa, penetrating wound       Family History   Problem Relation Age of Onset   • Hypertension Other        Social History     Socioeconomic History   • Marital status:      Spouse name: Not on file   • Number of children: Not on file   • Years of education: Not on file   • Highest education level: Not on file   Social Needs   • Financial resource strain: Not on file   • Food insecurity - worry: Not on file   • Food insecurity - inability: Not on file   • Transportation needs - medical: Not on file   • Transportation needs - non-medical: Not on file   Occupational History   • Not on file   Tobacco Use   • Smoking status: Former Smoker     Last attempt to quit: 2005     Years since quittin.2   • Smokeless tobacco: Never Used   Substance and Sexual Activity   • Alcohol use: Yes     Alcohol/week: 1.2 oz     Types: 2 Cans of beer per week   • Drug use: No   • Sexual activity: Defer   Other Topics Concern   • Not on file   Social History Narrative   • Not on file        Review of Systems   Constitutional: Positive for activity change. Negative for chills and fever.   HENT: Negative for facial swelling.    Eyes: Negative for photophobia.   Respiratory: Negative for apnea and shortness of breath.    Cardiovascular: Negative for chest pain and leg swelling.   Gastrointestinal: Negative for abdominal pain, nausea and vomiting.   Genitourinary: Negative for dysuria.   Musculoskeletal: Positive for joint swelling.   Skin: Negative for color change and rash.   Neurological: Positive for numbness. Negative for seizures and syncope.   Psychiatric/Behavioral: Negative for behavioral problems and dysphoric mood.         PHYSICAL EXAMINATION:       Ht 185.4 cm (73\")   Wt (!) 158 kg (349 lb)   BMI 46.04 kg/m²     Physical Exam   Constitutional: He is oriented to person, place, and time. He appears well-developed.   HENT:   Head: Normocephalic and atraumatic.   Eyes: " EOM are normal. Pupils are equal, round, and reactive to light.   Neck: Neck supple. No tracheal deviation present.   Pulmonary/Chest: Effort normal.   Musculoskeletal: Normal range of motion. He exhibits tenderness and deformity. He exhibits no edema.   Neurological: He is alert and oriented to person, place, and time.   Skin: Skin is warm and dry. No erythema.   Psychiatric: He has a normal mood and affect.       GAIT:     [x]  Normal  []  Antalgic    Assistive device: []  None  []  Walker     []  Crutches  []  Cane     []  Wheelchair  []  Stretcher    Ortho Exam  Tender posterior retrocalcaneal area on the right side mild prominence here.  There is more prominence on the left side.  Neurologically intact.  Not particularly tenderness over the heel cord.  Good subtalar and ankle motion.  He is neurovascular intact.        No results found.  X-rays show calcification up in the retrocalcaneal bursa and a Jj exostosis.      ASSESSMENT:    Diagnoses and all orders for this visit:    Pain of right heel    Postcalcaneal bursitis of right foot          PLAN we will can try some felt half inch heel lifts in both shoes.  I am going to send him for modalities iontophoresis and physical therapy.  I think he goes the extra mile to get this better conservatively.  If not improving consider MRI scan and he will call me and let me know how is getting along.    No Follow-up on file.      This document has been electronically signed by Collin Benavidez MD on March 11, 2019 5:31 PM

## 2019-04-17 ENCOUNTER — TELEPHONE (OUTPATIENT)
Dept: ORTHOPEDIC SURGERY | Facility: CLINIC | Age: 54
End: 2019-04-17

## 2019-04-17 DIAGNOSIS — M79.671 PAIN OF RIGHT HEEL: Primary | ICD-10-CM

## 2019-04-17 NOTE — TELEPHONE ENCOUNTER
DR NEWBY.  PATIENT CALLED STATING THE INNERSOLES HAS NOT HELPED.  HE SAID YOU MENTIONED SENDING HIM FOR IONTOPHORESIS AND PHYSICAL THERAPY OR DOING AN MRI IF HE WAS NO BETTER.  PLEASE CALL @ [phone]

## 2019-04-25 ENCOUNTER — OFFICE VISIT (OUTPATIENT)
Dept: SLEEP MEDICINE | Facility: HOSPITAL | Age: 54
End: 2019-04-25

## 2019-04-25 VITALS
HEART RATE: 82 BPM | SYSTOLIC BLOOD PRESSURE: 160 MMHG | OXYGEN SATURATION: 95 % | HEIGHT: 73 IN | WEIGHT: 315 LBS | BODY MASS INDEX: 41.75 KG/M2 | DIASTOLIC BLOOD PRESSURE: 96 MMHG

## 2019-04-25 DIAGNOSIS — G47.33 OBSTRUCTIVE SLEEP APNEA, ADULT: Primary | ICD-10-CM

## 2019-04-25 PROCEDURE — 99203 OFFICE O/P NEW LOW 30 MIN: CPT | Performed by: INTERNAL MEDICINE

## 2019-04-25 NOTE — PROGRESS NOTES
New Patient Sleep Medicine Consultation    Encounter Date: 2019         Patient's PCP: Provider, No Known  Referring provider: No ref. provider found  Reason for consultation chief complaint: snoring, awakening gasping for breath, witnessed apneas and excessive daytime sleepiness    Kurtis Valladares is a 53 y.o. male who admits to snoring, unrestful sleep, High blod pressure, gasping during sleep, stop breathing during sleep, frequent unexplained arousals, irritability, sleeping less than 6 hours per night, Disturbed or restless sleep, memory loss, Up to the bathroom at night, abnormal or violent dreams, restless legs at night and difficulty staying asleep.   He denies cataplexy, sleep paralysis, or hypnagogic hallucinations. His bedtime is ~ 2200. He  falls asleep after 5-15 minutes, and is up 5 times per night. He wakes up ~ 0600. He endorses 3-4 hours of sleep. He drinks 12 cups of coffee, 0 teas, and 2 sodas per day. He drinks 0 alcoholic beverages per week. He is not a current smoker. He does not take sedatives or hypnotics. He has no sleepiness with driving. He rarely naps.    [place] - 5    Past Medical History:   Diagnosis Date   • Acute bronchitis    • Asthma    • Dyspnea    • Hypertension    • Pain in lower limb    • Sprain of ankle      Social History     Socioeconomic History   • Marital status:      Spouse name: Not on file   • Number of children: Not on file   • Years of education: Not on file   • Highest education level: Not on file   Tobacco Use   • Smoking status: Former Smoker     Last attempt to quit: 2005     Years since quittin.3   • Smokeless tobacco: Never Used   Substance and Sexual Activity   • Alcohol use: Yes     Alcohol/week: 1.2 oz     Types: 2 Cans of beer per week   • Drug use: No   • Sexual activity: Defer     Family History   Problem Relation Age of Onset   • Hypertension Other      2 kids   for  and law enforcement  0 brothers and 1  "sisters  Other family history + for: none listed  Smoking history: smoked 3 ppds from age 20 until 40  FH of sleep disorders: cousin    Review of Systems:  Constitutional: negative  Eyes: negative  Ears, nose, mouth, throat, and face: negative  Respiratory: negative  Cardiovascular: negative  Gastrointestinal: negative  Genitourinary:negative  Integument/breast: negative  Hematologic/lymphatic: negative  Musculoskeletal:negative  Neurological: negative  Behavioral/Psych: negative  Endocrine: negative  Allergic/Immunologic: negative Patient advised to discuss any positive ROS with PCP.      Vitals:    04/25/19 1101   BP: 160/96   Pulse: 82   SpO2: 95%           04/25/19  1101   Weight: (!) 163 kg (359 lb 1.6 oz)     Body mass index is 47.39 kg/m². Patient's Body mass index is 47.39 kg/m². BMI is above normal parameters. Recommendations include: referral to primary care.  Neck circumference: 19.5\"          General: Alert. Cooperative. Well developed. No acute distress.             Head:  Normocephalic. Symmetrical. Atraumatic.              Eyes: Sclera clear. No icterus. PERRLA. Normal EOM.             Ears: No deformities. Normal hearing.             Nose: No septal deviation. No drainage.          Throat: No oral lesions. No thrush. Moist mucous membranes. Trachea midline    Tongue is enlarged    Dentition is fair       Pharynx: Posterior pharyngeal pillars are wide    Mallampati score of IV (only hard palate visible)    Pharynx is normal with unrermarkable tonsils   Chest Wall:  Normal shape. Symmetric expansion with respiration. No tenderness.          Lungs:  Clear to auscultation bilaterally. No wheezes. No rhonchi. No rales. Respirations regular, even and unlabored.            Heart:  Regular rhythm and normal rate. Normal S1 and S2. No murmur.     Abdomen:  Soft, non-tender and non-distended. Normal bowel sounds. No masses.  Extremities:  Moves all extremities well. No edema.           Pulses: Pulses palpable " and equal bilaterally.               Skin: Dry. Intact. No bleeding. No rash.           Neuro: Moves all 4 extremities and cranial nerves grossly intact.  Psychiatric: Normal mood and affect.      Current Outpatient Medications:   •  amLODIPine (NORVASC) 10 MG tablet, Take 1 tablet by mouth Daily., Disp: 30 tablet, Rfl: 0  •  aspirin (MAZIN ASPIRIN) 325 MG tablet, Take 325 mg by mouth Daily., Disp: , Rfl:   •  DICLOFENAC PO, Take 50 mg by mouth., Disp: , Rfl:   •  hydrochlorothiazide (HYDRODIURIL) 25 MG tablet, Take 1 tablet by mouth Daily., Disp: 30 tablet, Rfl: 0  •  lisinopril (PRINIVIL,ZESTRIL) 40 MG tablet, Take 1 tablet by mouth 2 (Two) Times a Day., Disp: 60 tablet, Rfl: 0    No results found for: WBC, RBC, HGB, HCT, MCV, MCH, MCHC, RDW, RDWSD, MPV, PLT, NEUTRORELPCT, LYMPHORELPCT, MONORELPCT, EOSRELPCT, BASORELPCT, AUTOIGPER, NEUTROABS, LYMPHSABS, MONOSABS, EOSABS, BASOSABS, AUTOIGNUM, NRBC    ASSESSMENT:  1. Obstructive sleep apnea New, further workup planned (4)  1. Check home sleep study   2. HTN  3. Obesity       This document has been electronically signed by Austin Yousif MD on April 25, 2019         CC: Provider, No Known          No ref. provider found

## 2019-04-29 ENCOUNTER — HOSPITAL ENCOUNTER (OUTPATIENT)
Dept: PHYSICAL THERAPY | Facility: HOSPITAL | Age: 54
Setting detail: THERAPIES SERIES
Discharge: HOME | End: 2019-04-29

## 2019-04-29 DIAGNOSIS — M79.671 PAIN OF RIGHT HEEL: Primary | ICD-10-CM

## 2019-04-29 PROCEDURE — 97161 PT EVAL LOW COMPLEX 20 MIN: CPT

## 2019-04-29 PROCEDURE — 97033 APP MDLTY 1+IONTPHRSIS EA 15: CPT

## 2019-04-29 NOTE — THERAPY EVALUATION
Outpatient Physical Therapy Ortho Initial Evaluation  [facility]     Patient Name: Kurtis Valladares  : 1965  MRN: [id number]  Today's Date: 2019      Visit Date: 2019  Visit:   REcert: 19  MD return: TBD      Patient Active Problem List   Diagnosis   • Right knee pain   • Primary osteoarthritis of right knee   • Effusion of right knee   • Pain of right heel   • Postcalcaneal bursitis of right foot        Past Medical History:   Diagnosis Date   • Acute bronchitis    • Asthma    • Dyspnea    • Hypertension    • Pain in lower limb    • Sprain of ankle         Past Surgical History:   Procedure Laterality Date   • OTHER SURGICAL HISTORY  2005    Arm Surgery (1) -  removal. foreign body right antecubital fossa, penetrating wound       Visit Dx:     ICD-10-CM ICD-9-CM   1. Pain of right heel M79.671 729.5         Patient History     Row Name 19 1300             History    Brief Description of Current Complaint  The pt states he has been having R heel pain since 2018. He states he began having mild heel pain that has progressively worsened. He states he cannot walk very well at this point secondary to heel pain. The pt notes he has a known heel spur in the R foot and that is the root of his pain. He states he works as the [employer] and as a contract  for the [employer]. Both jobs require him to walk and he has severe difficulty completing his jobs right now.  He states sometimes he gets a burning sensation in the back of his heel. He notes he has even been getting pain and stiffness in the middle toes of his R foot. The pt wants to avoid surgery. He has an ankle brace for the R foot that he is wearing at all times when walking. He notes this helps his heel some.   -MW      Patient's Rating of General Health  Good  -MW      What clinical tests have you had for this problem?  X-ray  -MW         Pain     Pain Location  Foot  -MW      Pain at  Present  4  -MW      Pain at Best  4  -MW      Pain at Worst  10  -MW      Pain Frequency  Constant/continuous  -MW      What Performance Factors Make the Current Problem(s) BETTER?  taping the heel, wearing ankle brace w/ heel cut-out, resting, soaking in salt, rolling the arch on a bottle  -MW      Difficulties at work?  yes  -MW      Difficulties with ADL's?  yes  -MW      Difficulties with recreational activities?  yes  -MW         Daily Activities    Primary Language  English  -MW      Pt Participated in POC and Goals  Yes  -MW        User Key  (r) = Recorded By, (t) = Taken By, (c) = Cosigned By    Initials Name Provider Type    MW Shell Golden, PT Physical Therapist          PT Ortho     Row Name 04/29/19 1300       Subjective Comments    Subjective Comments  See hx  -MW       Posture/Observations    Posture/Observations Comments  Point tenderness of the R calcaneus. No other TTP noted.   -MW       General ROM    GENERAL ROM COMMENTS  R ankle AROM WNL w/o pain  -MW       MMT (Manual Muscle Testing)    General MMT Comments  R ankle 5/5 for all planes  -MW       Gait/Stairs Assessment/Training    Comment (Gait/Stairs)  antalgic gait w/ limited R LE stance time  -MW      User Key  (r) = Recorded By, (t) = Taken By, (c) = Cosigned By    Initials Name Provider Type    MW Shell Golden, PT Physical Therapist                            PT OP Goals     Row Name 04/29/19 1400          PT Short Term Goals    STG Date to Achieve  05/20/19  -MW     STG 1  pt will be independent w/ HEP/self-management  -MW     STG 1 Progress  New  -MW     STG 2  pt will improve LEFS score by 10%  -MW     STG 2 Progress  New  -MW     STG 3  pt will report improved pain w/ work activities  -MW     STG 3 Progress  New  -MW     STG 4  pt will demonstarte non-antalgic gait in clinic  -MW     STG 4 Progress  New  -MW        Time Calculation    PT Goal Re-Cert Due Date  05/20/19  -MW       User Key  (r) = Recorded By, (t) = Taken By, (c)  = Cosigned By    Initials Name Provider Type    Shell Tejeda, PT Physical Therapist          PT Assessment/Plan     Row Name 04/29/19 1300          PT Assessment    Functional Limitations  Impaired gait;Impaired locomotion;Limitations in community activities;Performance in work activities  -MW     Impairments  Locomotion;Pain  -MW     Assessment Comments  The pt presented today for eval and tx of R heel pain and responded well. Sxs/signs consistent w/ possible plantar fasciitis sxs related to pt's known heel spur. The pt revealed during tx today that he has experienced a recent weight gain that may be attributing to his heel flare-up. The pt has significant heel pain that is impacting his functional ability at this time. His gait is antalgic. The pt would benefit from PT services to address these deficits and to ensure pt's safe recovery to PLOF and improved QOL. Time spent discussing APOLINAR and POC expectations this date. HEP implemented for sxs management today and pt demonstrated understanding. Based on today's session and pt's motivation to improve, I anticipate he will do well w/ rehab.  -MW     Please refer to paper survey for additional self-reported information  Yes  -MW     Rehab Potential  Good  -MW     Patient/caregiver participated in establishment of treatment plan and goals  Yes  -MW     Patient would benefit from skilled therapy intervention  Yes  -MW        PT Plan    PT Frequency  2x/week  -MW     Predicted Duration of Therapy Intervention (Therapy Eval)  2-3 weeks  -MW     Planned CPT's?  PT EVAL LOW COMPLEXITY: 04650;PT RE-EVAL: 23015;PT THER PROC EA 15 MIN: 36165;PT THER ACT EA 15 MIN: 45231;PT MANUAL THERAPY EA 15 MIN: 27605;PT NEUROMUSC RE-EDUCATION EA 15 MIN: 87431;PT GAIT TRAINING EA 15 MIN: 58553;PT SELF CARE/HOME MGMT/TRAIN EA 15: 58812;PT HOT OR COLD PACK TREAT MCARE;PT ELECTRICAL STIM UNATTEND: ;PT ULTRASOUND EA 15 MIN: 98229;PT IONTOPHORESIS EA 15 MIN: 32228  -MW     PT Plan  Comments  MD specifically recommending modalities including iontophoresis (suggested in MD note). US and [facility] PRN. Utilize manual tx and ther ex as appropriate. If pt fails to improve w/ tx measures over 4-5 sessions will refer back to MD.  -MW       User Key  (r) = Recorded By, (t) = Taken By, (c) = Cosigned By    Initials Name Provider Type    Shell Tejeda, PT Physical Therapist          Modalities     Row Name 04/29/19 1300             Iontophoresis 47316    Milliamps  3.3  -MW      MA/Min  40  -MW      Dexamethasone used  Yes 2 cc  -MW        User Key  (r) = Recorded By, (t) = Taken By, (c) = Cosigned By    Initials Name Provider Type    Shell Tejeda, PT Physical Therapist        Exercises     Row Name 04/29/19 1300             Subjective Comments    Subjective Comments  See hx  -MW        User Key  (r) = Recorded By, (t) = Taken By, (c) = Cosigned By    Initials Name Provider Type    Shell Tejeda, PT Physical Therapist                        Outcome Measure Options: Lower Extremity Functional Scale (LEFS)  Lower Extremity Functional Index  Any of your usual work, housework or school activities: Quite a bit of difficulty  Your usual hobbies, recreational or sporting activities: Quite a bit of difficulty  Getting into or out of the bath: Quite a bit of difficulty  Walking between rooms: Quite a bit of difficulty  Putting on your shoes or socks: Moderate difficulty  Squatting: Moderate difficulty  Lifting an object, like a bag of groceries from the floor: No difficulty  Performing light activities around your home: No difficulty  Performing heavy activities around your home: A little bit of difficulty  Getting into or out of a car: No difficulty  Walking 2 blocks: Extreme difficulty or unable to perform activity  Walking a mile: Extreme difficulty or unable to perform activity  Going up or down 10 stairs (about 1 flight of stairs): Quite a bit of difficulty  Standing for 1 hour: A little bit of  difficulty  Sitting for 1 hour: No difficulty  Running on even ground: Extreme difficulty or unable to perform activity  Running on uneven ground: Extreme difficulty or unable to perform activity  Making sharp turns while running fast: Extreme difficulty or unable to perform activity  Hopping: Quite a bit of difficulty  Rolling over in bed: No difficulty  Total: 36      Time Calculation:     Start Time: 1300  Stop Time: 1358  Time Calculation (min): 58 min     Therapy Charges for Today     Code Description Service Date Service Provider Modifiers Qty    30432800800 HC PT EVAL LOW COMPLEXITY 3 4/29/2019 Shell Golden, PT GP 1    13041148018 HC PT IONTOPHORESIS EA 15 MIN 4/29/2019 Shell Golden, PT GP 1                   Shell Golden PT  4/29/2019

## 2019-05-02 ENCOUNTER — APPOINTMENT (OUTPATIENT)
Dept: PHYSICAL THERAPY | Facility: HOSPITAL | Age: 54
End: 2019-05-02

## 2019-05-06 ENCOUNTER — HOSPITAL ENCOUNTER (OUTPATIENT)
Dept: PHYSICAL THERAPY | Facility: HOSPITAL | Age: 54
Setting detail: THERAPIES SERIES
Discharge: HOME | End: 2019-05-06

## 2019-05-06 DIAGNOSIS — M79.671 PAIN OF RIGHT HEEL: Primary | ICD-10-CM

## 2019-05-06 PROCEDURE — 97033 APP MDLTY 1+IONTPHRSIS EA 15: CPT

## 2019-05-06 PROCEDURE — 97110 THERAPEUTIC EXERCISES: CPT

## 2019-05-06 NOTE — THERAPY TREATMENT NOTE
Outpatient Physical Therapy Ortho Treatment Note  [facility]     Patient Name: Kurtis Valladares  : 1965  MRN: [id number]  Today's Date: 2019      Visit Date: 2019     Visit:   REcert: 19  MD return: TBD        Visit Dx:    ICD-10-CM ICD-9-CM   1. Pain of right heel M79.671 729.5       Patient Active Problem List   Diagnosis   • Right knee pain   • Primary osteoarthritis of right knee   • Effusion of right knee   • Pain of right heel   • Postcalcaneal bursitis of right foot        Past Medical History:   Diagnosis Date   • Acute bronchitis    • Asthma    • Dyspnea    • Hypertension    • Pain in lower limb    • Sprain of ankle         Past Surgical History:   Procedure Laterality Date   • OTHER SURGICAL HISTORY  2005    Arm Surgery (1) -  removal. foreign body right antecubital fossa, penetrating wound                       PT Assessment/Plan     Row Name 19 1100          PT Assessment    Assessment Comments  Did great and had better gait post visit.  (Pended)   -KM        PT Plan    PT Frequency  2x/week  (Pended)   -KM     Predicted Duration of Therapy Intervention (Therapy Eval)  2-3 weeks  (Pended)   -KM     PT Plan Comments  Cont to advance HEP.  Patient to consider restarting weight loss program ([place]).  (Pended)   -KM       User Key  (r) = Recorded By, (t) = Taken By, (c) = Cosigned By    Initials Name Provider Type    Austin Adan, [employer]           Modalities     Row Name 19 1100             Iontophoresis 43488    Milliamps  3.3  (Pended)   -KM      MA/Min  40  (Pended)   -KM      Dexamethasone used  Yes  (Pended)  2 cc  -KM        User Key  (r) = Recorded By, (t) = Taken By, (c) = Cosigned By    Initials Name Provider Type    Austin Adan, [employer]         Exercises     Row Name 19 1100             Subjective Comments    Subjective Comments  No new issues over weekend.  Opened personal pool at home and was  "limping by the end of task.  Pain level today is low because I \"haven't walked much yet\".  (Pended)   -KM         Subjective Pain    Able to rate subjective pain?  yes  (Pended)   -KM      Pre-Treatment Pain Level  2  (Pended)   -KM      Post-Treatment Pain Level  1  (Pended)   -KM         Exercise 1    Exercise Name 1  Ankle DF/PF: Poolside  (Pended)   -KM      Reps 1  30  (Pended)   -KM         Exercise 2    Exercise Name 2  Ankle Inv/Ev: Poolside  (Pended)   -KM      Reps 2  30  (Pended)   -KM         Exercise 3    Exercise Name 3  Ankle Circles:Poolside  (Pended)   -KM      Reps 3  30  (Pended)   -KM         Exercise 4    Exercise Name 4  St Calf S  (Pended)   -KM      Sets 4  3  (Pended)   -KM      Time 4  30 sec  (Pended)   -KM         Exercise 5    Exercise Name 5  St Ham S  (Pended)   -KM      Reps 5  3  (Pended)   -KM      Time 5  30 sec  (Pended)   -KM        User Key  (r) = Recorded By, (t) = Taken By, (c) = Cosigned By    Initials Name Provider Type    Austin Adan, [facility]                        PT OP Goals     Row Name 05/06/19 1100          PT Short Term Goals    STG Date to Achieve  05/20/19  (Pended)   -KM     STG 1  pt will be independent w/ HEP/self-management  (Pended)   -KM     STG 1 Progress  Ongoing  (Pended)   -KM     STG 2  pt will improve LEFS score by 10%  (Pended)   -KM     STG 2 Progress  Ongoing  (Pended)   -KM     STG 3  pt will report improved pain w/ work activities  (Pended)   -KM     STG 3 Progress  Ongoing  (Pended)   -KM     STG 4  pt will demonstarte non-antalgic gait in clinic  (Pended)   -KM     STG 4 Progress  Ongoing  (Pended)   -KM        Time Calculation    PT Goal Re-Cert Due Date  05/20/19  (Pended)   -KM       User Key  (r) = Recorded By, (t) = Taken By, (c) = Cosigned By    Initials Name Provider Type    Austin Adan, ATC           Therapy Education  Given: (P) HEP  Program: (P) New  How Provided: (P) Verbal, " Demonstration  Provided to: (P) Patient  Level of Understanding: (P) Demonstrated              Time Calculation:   Start Time: (P) 1105  Stop Time: (P) 1155  Time Calculation (min): (P) 50 min  Therapy Charges for Today     Code Description Service Date Service Provider Modifiers Qty    40942833476 HC PT IONTOPHORESIS EA 15 MIN 5/6/2019 Austin Nunez, ATC  1    54626501840 HC PT THER PROC EA 15 MIN 5/6/2019 Austin Nunez, ATC  2                    Austin Nunez, ATC  5/6/2019

## 2019-05-08 ENCOUNTER — APPOINTMENT (OUTPATIENT)
Dept: PHYSICAL THERAPY | Facility: HOSPITAL | Age: 54
End: 2019-05-08

## 2019-05-09 ENCOUNTER — HOSPITAL ENCOUNTER (OUTPATIENT)
Dept: PHYSICAL THERAPY | Facility: HOSPITAL | Age: 54
Setting detail: THERAPIES SERIES
Discharge: HOME | End: 2019-05-09

## 2019-05-09 DIAGNOSIS — M79.671 PAIN OF RIGHT HEEL: Primary | ICD-10-CM

## 2019-05-09 PROCEDURE — 97110 THERAPEUTIC EXERCISES: CPT

## 2019-05-09 PROCEDURE — 97033 APP MDLTY 1+IONTPHRSIS EA 15: CPT

## 2019-05-09 PROCEDURE — 97035 APP MDLTY 1+ULTRASOUND EA 15: CPT

## 2019-05-09 NOTE — THERAPY TREATMENT NOTE
Outpatient Physical Therapy Ortho Treatment Note  [facility]     Patient Name: Kurtis Valladares  : 1965  MRN: [id number]  Today's Date: 2019      Visit Date: 2019  Visits 3/3. Recert . MD f/jett QUINONEZ. % improvement.   Visit Dx:    ICD-10-CM ICD-9-CM   1. Pain of right heel M79.671 729.5       Patient Active Problem List   Diagnosis   • Right knee pain   • Primary osteoarthritis of right knee   • Effusion of right knee   • Pain of right heel   • Postcalcaneal bursitis of right foot        Past Medical History:   Diagnosis Date   • Acute bronchitis    • Asthma    • Dyspnea    • Hypertension    • Pain in lower limb    • Sprain of ankle         Past Surgical History:   Procedure Laterality Date   • OTHER SURGICAL HISTORY  2005    Arm Surgery (1) -  removal. foreign body right antecubital fossa, penetrating wound       PT Ortho     Row Name 19 1300       Subjective Pain    Post-Treatment Pain Level  -- decreased  -      User Key  (r) = Recorded By, (t) = Taken By, (c) = Cosigned By    Initials Name Provider Type    Patrice Valenzuela, PTA Physical Therapy Assistant                      PT Assessment/Plan     Row Name 19 1400          PT Assessment    Functional Limitations  Impaired gait;Impaired locomotion;Limitations in community activities;Performance in work activities  -     Impairments  Locomotion;Pain  -     Assessment Comments  Good alfa of todays visit. Decreased pain afterward. Sub HEP compliance.   -     Rehab Potential  Good  -     Patient/caregiver participated in establishment of treatment plan and goals  Yes  -     Patient would benefit from skilled therapy intervention  Yes  -        PT Plan    PT Frequency  2x/week  -     Predicted Duration of Therapy Intervention (Therapy Eval)  2-3 weeks  -     PT Plan Comments  Cont per POC.   -       User Key  (r) = Recorded By, (t) = Taken By, (c) = Cosigned By    Initials Name Provider Type    SETH  "Patrice Peralta PTA Physical Therapy Assistant          Modalities     Row Name 05/09/19 1300             Ultrasound 96314    Location  R heel  -JW      Duty Cycle  100  -JW      Frequency  1.0 MHz  -JW      Intensity - Wts/cm  1  -JW      28486 - PT Ultrasound Minutes  8  -JW         Iontophoresis 30409    Milliamps  3.5  -JW      MA/Min  40  -JW      Dexamethasone used  Yes  -JW        User Key  (r) = Recorded By, (t) = Taken By, (c) = Cosigned By    Initials Name Provider Type    Patrice Valenzuela PTA Physical Therapy Assistant        Exercises     Row Name 05/09/19 1300             Subjective Pain    Able to rate subjective pain?  yes  -JW      Pre-Treatment Pain Level  5  -JW      Post-Treatment Pain Level  -- decreased  -JW         Exercise 1    Exercise Name 1  US  -JW         Exercise 2    Exercise Name 2  Incline bd S  -JW      Sets 2  2  -JW      Time 2  30\"  -JW         Exercise 3    Exercise Name 3  HS S  -JW      Sets 3  2  -JW      Time 3  30\"  -JW         Exercise 4    Exercise Name 4  Toe crunches  -JW      Reps 4  30  -JW         Exercise 5    Exercise Name 5  STM with rolling stick  -JW      Time 5  4'  -JW         Exercise 6    Exercise Name 6  Ionto  -JW        User Key  (r) = Recorded By, (t) = Taken By, (c) = Cosigned By    Initials Name Provider Type    JW Patrice Peralta PTA Physical Therapy Assistant                       PT OP Goals     Row Name 05/09/19 1437 05/09/19 1400       PT Short Term Goals    STG Date to Achieve  --  05/20/19  -JW    STG 1  --  pt will be independent w/ HEP/self-management  -JW    STG 1 Progress  --  Ongoing  -JW    STG 2  --  pt will improve LEFS score by 10%  -JW    STG 2 Progress  --  Ongoing  -JW    STG 3  --  pt will report improved pain w/ work activities  -JW    STG 3 Progress  --  Ongoing  -JW    STG 4  --  pt will demonstarte non-antalgic gait in clinic  -JW    STG 4 Progress  --  Ongoing  -JW       Time Calculation    PT Goal Re-Cert Due Date  " 05/20/19  -SETH  --      User Key  (r) = Recorded By, (t) = Taken By, (c) = Cosigned By    Initials Name Provider Type    Patrice Valenzuela PTA Physical Therapy Assistant                         Time Calculation:   Start Time: 1345  Stop Time: 1435  Time Calculation (min): 50 min  Total Timed Code Minutes- PT: 50 minute(s)  Therapy Charges for Today     Code Description Service Date Service Provider Modifiers Qty    26764716133 HC PT ULTRASOUND EA 15 MIN 5/9/2019 Patrice Peralta PTA GP 1    59554192551 HC PT THER PROC EA 15 MIN 5/9/2019 Patrice Peralta PTA GP 1    19272107630 HC PT IONTOPHORESIS EA 15 MIN 5/9/2019 Patrice Peralta PTA GP 1                    Patrice CAGE. PETR Peralta  5/9/2019

## 2019-05-13 ENCOUNTER — HOSPITAL ENCOUNTER (OUTPATIENT)
Dept: PHYSICAL THERAPY | Facility: HOSPITAL | Age: 54
Setting detail: THERAPIES SERIES
Discharge: HOME | End: 2019-05-13

## 2019-05-13 DIAGNOSIS — M79.671 PAIN OF RIGHT HEEL: Primary | ICD-10-CM

## 2019-05-13 PROCEDURE — 97035 APP MDLTY 1+ULTRASOUND EA 15: CPT | Performed by: PHYSICAL THERAPIST

## 2019-05-13 PROCEDURE — 97110 THERAPEUTIC EXERCISES: CPT | Performed by: PHYSICAL THERAPIST

## 2019-05-13 NOTE — THERAPY TREATMENT NOTE
Outpatient Physical Therapy Ortho Treatment Note  [facility]     Patient Name: Kurtis Valladares  : 1965  MRN: [id number]  Today's Date: 2019      Visit Date: 2019    Visits  Recert . MD f/jett QUINONEZ. 10% improvement.      Visit Dx:    ICD-10-CM ICD-9-CM   1. Pain of right heel M79.671 729.5       Patient Active Problem List   Diagnosis   • Right knee pain   • Primary osteoarthritis of right knee   • Effusion of right knee   • Pain of right heel   • Postcalcaneal bursitis of right foot        Past Medical History:   Diagnosis Date   • Acute bronchitis    • Asthma    • Dyspnea    • Hypertension    • Pain in lower limb    • Sprain of ankle         Past Surgical History:   Procedure Laterality Date   • OTHER SURGICAL HISTORY  2005    Arm Surgery (1) -  removal. foreign body right antecubital fossa, penetrating wound       PT Ortho     Row Name 19 1200       Subjective Comments    Subjective Comments  Feels the patch of medicine is helping.  -DD       Subjective Pain    Able to rate subjective pain?  yes  -DD    Pre-Treatment Pain Level  3  -DD    Post-Treatment Pain Level  3  -DD       Posture/Observations    Posture/Observations Comments  TTP medial calcaneal tubercle and achilles tendion insertion.  -DD      User Key  (r) = Recorded By, (t) = Taken By, (c) = Cosigned By    Initials Name Provider Type    DD Griselda Jarrell, PT DPT Physical Therapist                      PT Assessment/Plan     Row Name 19 1207          PT Assessment    Functional Limitations  Impaired gait  -DD     Impairments  Pain  -DD     Assessment Comments  Good tolerance to  modalities and manual stretching, STM  -DD     Rehab Potential  Good  -DD     Patient/caregiver participated in establishment of treatment plan and goals  Yes  -DD     Patient would benefit from skilled therapy intervention  Yes  -DD        PT Plan    PT Frequency  2x/week  -DD     Predicted Duration of Therapy  Intervention (Therapy Eval)  2 weeks  -DD     Planned CPT's?  PT EVAL LOW COMPLEXITY: 21439;PT HOT/COLD PACK WC NONMCARE: 27977;PT ULTRASOUND EA 15 MIN: 14613;PT IONTOPHORESIS EA 15 MIN: 80288  -DD       User Key  (r) = Recorded By, (t) = Taken By, (c) = Cosigned By    Initials Name Provider Type    DD Griselda Jarrell, PT DPT Physical Therapist          Modalities     Row Name 05/13/19 1110             Ultrasound 28217    Location  R heel 8 min  -DD      Duty Cycle  100  -DD      Frequency  1.0 MHz  -DD      Intensity - Wts/cm  1  -DD         Iontophoresis 56961    Milliamps  3.5  -DD      MA/Min  40  -DD      Dexamethasone used  Yes  -DD        User Key  (r) = Recorded By, (t) = Taken By, (c) = Cosigned By    Initials Name Provider Type    Griselda Rogers, PT DPT Physical Therapist        Exercises     Row Name 05/13/19 1200 05/13/19 1100          Subjective Comments    Subjective Comments  Feels the patch of medicine is helping.  -DD  --        Subjective Pain    Able to rate subjective pain?  yes  -DD  --     Pre-Treatment Pain Level  3  -DD  --     Post-Treatment Pain Level  3  -DD  --        Exercise 1    Exercise Name 1  --  US  -DD        Exercise 2    Exercise Name 2  --  towel stretch  -DD     Time 2  --  3  -DD        Exercise 3    Exercise Name 3  --  passive stretch by PT  -DD     Reps 3  --  3  -DD        Exercise 4    Exercise Name 4  --  STM calf and ankle  -DD     Time 4  --  7'  -DD        Exercise 5    Exercise Name 5  --  Ionto  private pay.  -DD       User Key  (r) = Recorded By, (t) = Taken By, (c) = Cosigned By    Initials Name Provider Type    DD Griselda Jarrell, PT DPT Physical Therapist                       PT OP Goals     Row Name 05/13/19 1111 05/13/19 1110       PT Short Term Goals    STG Date to Achieve  05/20/19  -DD  --  -DD    STG 1  Pt will be independent w/ HEP/self-management  -DD  --  -DD    STG 1 Progress  Ongoing  -DD  --  -DD    STG 2  Pt will improve  LEFS score to 40/80  -DD  --  -DD    STG 2 Progress  Ongoing  -DD  --  -DD    STG 3  Pt will report improved pain w/ work activities  -DD  --  -DD    STG 3 Progress  Ongoing  -DD  --  -DD    STG 4  Pt will demonstarte non-antalgic gait in clinic  -DD  --  -DD    STG 4 Progress  Ongoing  -DD  --  -DD      User Key  (r) = Recorded By, (t) = Taken By, (c) = Cosigned By    Initials Name Provider Type    DD Griselda Jarrell, PT DPT Physical Therapist             Time Calculation:   Start Time: 1110  Stop Time: 1212  Time Calculation (min): 62 min  Total Timed Code Minutes- PT: 12 minute(s)  Therapy Charges for Today     Code Description Service Date Service Provider Modifiers Qty    96902564982  PT THER PROC EA 15 MIN 5/13/2019 Griselda Jarrell, PT DPT GP 1    [id number]  PT ULTRASOUND EA 15 MIN 5/13/2019 Griselda Jarrell, PT DPT GP 1            Griselda Jarrell, PT DPT  5/13/2019

## 2019-05-16 ENCOUNTER — HOSPITAL ENCOUNTER (OUTPATIENT)
Dept: PHYSICAL THERAPY | Facility: HOSPITAL | Age: 54
Setting detail: THERAPIES SERIES
Discharge: HOME | End: 2019-05-16

## 2019-05-16 DIAGNOSIS — M79.671 PAIN OF RIGHT HEEL: Primary | ICD-10-CM

## 2019-05-16 PROCEDURE — 97033 APP MDLTY 1+IONTPHRSIS EA 15: CPT

## 2019-05-16 PROCEDURE — 97110 THERAPEUTIC EXERCISES: CPT

## 2019-05-16 PROCEDURE — 97035 APP MDLTY 1+ULTRASOUND EA 15: CPT

## 2019-05-16 NOTE — THERAPY TREATMENT NOTE
Outpatient Physical Therapy Ortho Treatment Note  [facility]     Patient Name: Kurtis Valladares  : 1965  MRN: [id number]  Today's Date: 2019      Visit Date: 2019     Visits    Recert .   MD f/jett QUINONEZ.   10% improvement.       Visit Dx:    ICD-10-CM ICD-9-CM   1. Pain of right heel M79.671 729.5       Patient Active Problem List   Diagnosis   • Right knee pain   • Primary osteoarthritis of right knee   • Effusion of right knee   • Pain of right heel   • Postcalcaneal bursitis of right foot        Past Medical History:   Diagnosis Date   • Acute bronchitis    • Asthma    • Dyspnea    • Hypertension    • Pain in lower limb    • Sprain of ankle         Past Surgical History:   Procedure Laterality Date   • OTHER SURGICAL HISTORY  2005    Arm Surgery (1) -  removal. foreign body right antecubital fossa, penetrating wound       PT Ortho     Row Name 19 1200       Subjective Comments    Subjective Comments  Feels the patch of medicine is helping.  -DD       Subjective Pain    Able to rate subjective pain?  yes  -DD    Pre-Treatment Pain Level  3  -DD    Post-Treatment Pain Level  3  -DD       Posture/Observations    Posture/Observations Comments  TTP medial calcaneal tubercle and achilles tendion insertion.  -DD      User Key  (r) = Recorded By, (t) = Taken By, (c) = Cosigned By    Initials Name Provider Type    Griselda Rogers, PT DPT Physical Therapist                      PT Assessment/Plan     Row Name 19 1000          PT Assessment    Assessment Comments  Did well with all.  (Pended)   -KM        PT Plan    PT Frequency  2x/week  (Pended)   -KM     Predicted Duration of Therapy Intervention (Therapy Eval)  2 weeks  (Pended)   -KM     PT Plan Comments  Continue to develop ankle/calf strength.  (Pended)   -KM       User Key  (r) = Recorded By, (t) = Taken By, (c) = Cosigned By    Initials Name Provider Type    KM Austin Nunez, ATC      "      Modalities     Row Name 05/16/19 0900             Ultrasound 39605    Location  R heel  (Pended)  8 min  -KM      Duty Cycle  100  (Pended)   -KM      Frequency  1.0 MHz  (Pended)   -KM      Intensity - Wts/cm  1  (Pended)   -KM      55818 - PT Ultrasound Minutes  8  (Pended)   -KM         Iontophoresis 22052    Milliamps  3.5  (Pended)   -KM      MA/Min  40  (Pended)   -KM      Dexamethasone used  Yes  (Pended)   -KM        User Key  (r) = Recorded By, (t) = Taken By, (c) = Cosigned By    Initials Name Provider Type    KM Austin Nunez, ATC         Exercises     Row Name 05/16/19 0900             Subjective Comments    Subjective Comments  Has been able to work longer hours (10+ per days) the past couple of days.  Distal achilles is more tender starting yesterday afternoon.  Patient states he has begun a weight oss program and down 11 lbs.  (Pended)   -KM         Subjective Pain    Able to rate subjective pain?  yes  (Pended)   -KM      Pre-Treatment Pain Level  7  (Pended)   -KM      Post-Treatment Pain Level  3  (Pended)   -KM         Exercise 1    Exercise Name 1  Ionto w/ Dex  (Pended)   -KM         Exercise 2    Exercise Name 2  US  (Pended)   -KM      Time 2  8  (Pended)   -KM         Exercise 3    Exercise Name 3  St Calf S  (Pended)   -KM      Sets 3  3  (Pended)   -KM      Reps 3  --  (Pended)   -KM      Time 3  30\"  (Pended)   -KM         Exercise 4    Exercise Name 4  St Ham S  (Pended)   -KM      Sets 4  3  (Pended)   -KM      Time 4  30\"  (Pended)   -KM         Exercise 5    Exercise Name 5  St Ankle Wobble Board  (Pended)   -KM      Sets 5  --  (Pended)  CW/CCW  -KM      Reps 5  30x Ea  (Pended)   -KM         Exercise 6    Exercise Name 6  St Calf Raise  (Pended)   -KM      Sets 6  2  (Pended)   -KM      Reps 6  10  (Pended)   -KM         Exercise 7    Exercise Name 7  ST Rocker Board: 4 Way  (Pended)   -KM      Reps 7  10x Ea  (Pended)   -KM         Exercise 8    Exercise Name " 8  Seated Ankle Dorsiflexion  (Pended)   -KM      Reps 8  30x  (Pended)   -KM      Additional Comments  --  (Pended)  Blue Tband  -KM        User Key  (r) = Recorded By, (t) = Taken By, (c) = Cosigned By    Initials Name Provider Type    Austin Adan, ALYSA                        PT OP Goals     Row Name 05/16/19 0900          PT Short Term Goals    STG Date to Achieve  05/20/19  (Pended)   -KM     STG 1  Pt will be independent w/ HEP/self-management  (Pended)   -KM     STG 1 Progress  Ongoing  (Pended)   -KM     STG 2  Pt will improve LEFS score to 40/80  (Pended)   -KM     STG 2 Progress  Ongoing  (Pended)   -KM     STG 3  Pt will report improved pain w/ work activities  (Pended)   -KM     STG 3 Progress  Ongoing  (Pended)   -KM     STG 4  Pt will demonstarte non-antalgic gait in clinic  (Pended)   -KM     STG 4 Progress  Ongoing  (Pended)   -KM        Time Calculation    PT Goal Re-Cert Due Date  05/20/19  (Pended)   -KM       User Key  (r) = Recorded By, (t) = Taken By, (c) = Cosigned By    Initials Name Provider Type    Autsin Adan ATC           Therapy Education  Given: (P) HEP  Program: (P) Progressed  How Provided: (P) Verbal  Provided to: (P) Patient  Level of Understanding: (P) Demonstrated              Time Calculation:   Start Time: (P) 0955  Stop Time: (P) 1055  Time Calculation (min): (P) 60 min  Therapy Charges for Today     Code Description Service Date Service Provider Modifiers Qty    56373686757 HC PT ULTRASOUND EA 15 MIN 5/16/2019 Austin Nunez, ATC  1    60776091664 HC PT IONTOPHORESIS EA 15 MIN 5/16/2019 Austin Nunez, ATC  1    97928221516 HC PT THER PROC EA 15 MIN 5/16/2019 Austin Nunez, ATC  2                    Austin Nunez ATC  5/16/2019

## 2019-05-20 ENCOUNTER — APPOINTMENT (OUTPATIENT)
Dept: PHYSICAL THERAPY | Facility: HOSPITAL | Age: 54
End: 2019-05-20

## 2019-05-21 ENCOUNTER — HOSPITAL ENCOUNTER (OUTPATIENT)
Dept: SLEEP MEDICINE | Facility: HOSPITAL | Age: 54
Discharge: HOME | End: 2019-05-21
Admitting: INTERNAL MEDICINE

## 2019-05-21 ENCOUNTER — APPOINTMENT (OUTPATIENT)
Dept: SLEEP MEDICINE | Facility: HOSPITAL | Age: 54
End: 2019-05-21

## 2019-05-21 DIAGNOSIS — G47.33 OBSTRUCTIVE SLEEP APNEA, ADULT: ICD-10-CM

## 2019-05-21 PROCEDURE — 95806 SLEEP STUDY UNATT&RESP EFFT: CPT

## 2019-05-21 PROCEDURE — 95806 SLEEP STUDY UNATT&RESP EFFT: CPT | Performed by: INTERNAL MEDICINE

## 2019-05-23 ENCOUNTER — APPOINTMENT (OUTPATIENT)
Dept: PHYSICAL THERAPY | Facility: HOSPITAL | Age: 54
End: 2019-05-23

## 2019-05-23 DIAGNOSIS — G47.33 OBSTRUCTIVE SLEEP APNEA, ADULT: Primary | ICD-10-CM

## 2019-07-11 ENCOUNTER — OFFICE VISIT (OUTPATIENT)
Dept: PODIATRY | Facility: CLINIC | Age: 54
End: 2019-07-11

## 2019-07-11 VITALS — WEIGHT: 315 LBS | OXYGEN SATURATION: 98 % | BODY MASS INDEX: 41.75 KG/M2 | HEIGHT: 73 IN | HEART RATE: 99 BPM

## 2019-07-11 DIAGNOSIS — M72.2 PLANTAR FASCIITIS: ICD-10-CM

## 2019-07-11 DIAGNOSIS — R52 PAIN: ICD-10-CM

## 2019-07-11 DIAGNOSIS — M24.573 EQUINUS CONTRACTURE OF ANKLE: ICD-10-CM

## 2019-07-11 DIAGNOSIS — M79.671 PAIN IN BOTH FEET: Primary | ICD-10-CM

## 2019-07-11 DIAGNOSIS — M79.672 PAIN IN BOTH FEET: Primary | ICD-10-CM

## 2019-07-11 PROCEDURE — 20550 NJX 1 TENDON SHEATH/LIGAMENT: CPT | Performed by: PODIATRIST

## 2019-07-11 PROCEDURE — 99203 OFFICE O/P NEW LOW 30 MIN: CPT | Performed by: PODIATRIST

## 2019-07-11 RX ORDER — OMEPRAZOLE 20 MG/1
20 CAPSULE, DELAYED RELEASE ORAL DAILY
COMMUNITY
End: 2020-04-27

## 2019-07-11 RX ORDER — MINOCYCLINE HYDROCHLORIDE 100 MG/1
100 CAPSULE ORAL 2 TIMES DAILY
COMMUNITY
End: 2020-04-27

## 2019-07-11 NOTE — PROGRESS NOTES
Kurtis Valladares  1965  53 y.o. male     Patient presents to clinic with pain in right and left foot plus heel. Patient states pain 9/10    2019  Chief Complaint   Patient presents with   • Left Foot - Pain   • Right Foot - Pain           History of Present Illness    Kurtis Valladares is a 53 y.o. male who presents for evaluation of bilateral foot pain.  Patient states the pain is primarily in the area of his heels.  Some of the pain is on the bottoms but more so to the backside of his heel.  He describes his pain is aching at times throbbing especially with prolonged activity.  He denies any trauma or injuries.  He previously been seen by Bill Shabazz of orthopedics for this issue.  He had undergone physical therapy, shoe gear modification, heel lifts and NSAIDs.  He states he also used did receive a gluteal corticosteroid injection every couple months.  These treatments have had declining efficacy.  He states it is becoming difficult to perform his daily job activities.      Past Medical History:   Diagnosis Date   • Acute bronchitis    • Asthma    • Dyspnea    • Hypertension    • Pain in lower limb    • Sprain of ankle          Past Surgical History:   Procedure Laterality Date   • OTHER SURGICAL HISTORY  2005    Arm Surgery (1) -  removal. foreign body right antecubital fossa, penetrating wound         Family History   Problem Relation Age of Onset   • Hypertension Other    • Hypertension Mother    • Hypertension Father          Social History     Socioeconomic History   • Marital status:      Spouse name: Not on file   • Number of children: Not on file   • Years of education: Not on file   • Highest education level: Not on file   Tobacco Use   • Smoking status: Former Smoker     Last attempt to quit: 2005     Years since quittin.5   • Smokeless tobacco: Never Used   Substance and Sexual Activity   • Alcohol use: Yes     Alcohol/week: 1.2 oz     Types: 2 Cans of beer  "per week   • Drug use: No   • Sexual activity: Defer         Current Outpatient Medications   Medication Sig Dispense Refill   • amLODIPine (NORVASC) 10 MG tablet Take 1 tablet by mouth Daily. 30 tablet 0   • aspirin (MAZIN ASPIRIN) 325 MG tablet Take 325 mg by mouth Daily.     • DICLOFENAC PO Take 50 mg by mouth.     • hydrochlorothiazide (HYDRODIURIL) 25 MG tablet Take 1 tablet by mouth Daily. 30 tablet 0   • lisinopril (PRINIVIL,ZESTRIL) 40 MG tablet Take 1 tablet by mouth 2 (Two) Times a Day. 60 tablet 0   • magnesium oxide (MAGOX) 400 (241.3 Mg) MG tablet tablet Take 400 mg by mouth Daily.     • minocycline (MINOCIN,DYNACIN) 100 MG capsule Take 100 mg by mouth 2 (Two) Times a Day.     • omeprazole (priLOSEC) 20 MG capsule Take 20 mg by mouth Daily.       Current Facility-Administered Medications   Medication Dose Route Frequency Provider Last Rate Last Dose   • betamethasone acetate-betamethasone sodium phosphate (CELESTONE SOLUSPAN) injection 6 mg  6 mg Intramuscular Once Jono Bucio DPM       • bupivacaine (PF) (MARCAINE) 0.5 % injection 1 mL  1 mL Injection Once Jono Bucio DPM             OBJECTIVE    Pulse 99   Ht 185.4 cm (72.99\")   Wt (!) 163 kg (359 lb)   SpO2 98%   BMI 47.38 kg/m²       Review of Systems   Constitutional: Negative.    HENT: Negative.    Eyes: Negative.    Respiratory: Negative.    Cardiovascular: Negative.    Gastrointestinal: Negative.    Endocrine: Negative.    Genitourinary: Negative.    Musculoskeletal: Positive for joint swelling.        Joint pain, foot pain, arthritis, and foot pain    Allergic/Immunologic: Negative.    Neurological: Negative.    Hematological: Negative.    Psychiatric/Behavioral: Negative.          Physical Exam   Constitutional: he appears well-developed and well-nourished.   HEENT: Normocephalic. Atraumatic.  CV: No CP. RRR  Resp: Non-labored respirations.  Psychiatric: he has a normal mood and affect. his behavior is normal.         Lower " Extremity Exam:  Vascular: DP/PT pulses palpable 2+.   Minimal plantar heel edema, Right  Foot warm  Neuro: Protective sensation intact, b/l.  DTRs intact  Negative Tinel over tarsal tunnel  Integument: No open wounds or lesions.  No erythema, scaling  No masses  Musculoskeletal: LE muscle strength 5/5.   Gait normal  Ankle ROM decreased without pain or crepitus  STJ ROM full without pain or crepitus  Pain on palpation of plantar calcaneal tubercles, b/l, R>L  Minimal tenderness to lateral compression of calcaneus, R    Right heel injection:  Risks and benefits discussed. Written consent obtained.  Skin prepped with alcohol  Medial heel injection 1cc 0.5% Marcaine, 1cc Celestone with 27g needle  Band-aid applied.  Pt tolerated well.            ASSESSMENT AND PLAN    Kurtis was seen today for pain and pain.    Diagnoses and all orders for this visit:    Pain in both feet    Pain  -     XR Foot 3+ View Bilateral    Plantar fasciitis  -     betamethasone acetate-betamethasone sodium phosphate (CELESTONE SOLUSPAN) injection 6 mg  -     bupivacaine (PF) (MARCAINE) 0.5 % injection 1 mL    Equinus contracture of ankle      -Comprehensive foot and ankle exam performed  -Radiographs ordered and reviewed  -Educated pt on diagnosis, etiology and treatment of plantar fasciitis.  -Continue motion control shoe  -Recommend power step over-the-counter inserts  -Aggressive stretching regimen, patient education materials dispensed  -Corticosteroid injection as above  -Recheck 4 weeks            This document has been electronically signed by Jono Bucio DPM on July 15, 2019 8:21 PM     EMR Dragon/Transcription disclaimer:   Much of this encounter note is an electronic transcription/translation of spoken language to printed text. The electronic translation of spoken language may permit erroneous, or at times, nonsensical words or phrases to be inadvertently transcribed; Although I have reviewed the note for such errors, some may  still exist.    Jono Bucio DPM  7/15/2019  8:21 PM

## 2019-07-15 RX ORDER — BUPIVACAINE HYDROCHLORIDE 5 MG/ML
1 INJECTION, SOLUTION EPIDURAL; INTRACAUDAL ONCE
Status: SHIPPED | OUTPATIENT
Start: 2019-07-15

## 2019-07-15 RX ORDER — BETAMETHASONE SODIUM PHOSPHATE AND BETAMETHASONE ACETATE 3; 3 MG/ML; MG/ML
6 INJECTION, SUSPENSION INTRA-ARTICULAR; INTRALESIONAL; INTRAMUSCULAR; SOFT TISSUE ONCE
Status: DISCONTINUED | OUTPATIENT
Start: 2019-07-15 | End: 2020-01-22

## 2019-11-07 ENCOUNTER — HOSPITAL ENCOUNTER (OUTPATIENT)
Dept: GENERAL RADIOLOGY | Facility: HOSPITAL | Age: 54
Discharge: HOME | End: 2019-11-07
Admitting: INTERNAL MEDICINE

## 2019-11-07 ENCOUNTER — OFFICE VISIT (OUTPATIENT)
Dept: PULMONOLOGY | Facility: CLINIC | Age: 54
End: 2019-11-07

## 2019-11-07 ENCOUNTER — PROCEDURE VISIT (OUTPATIENT)
Dept: PULMONOLOGY | Facility: CLINIC | Age: 54
End: 2019-11-07

## 2019-11-07 VITALS
HEART RATE: 83 BPM | HEIGHT: 73 IN | OXYGEN SATURATION: 98 % | SYSTOLIC BLOOD PRESSURE: 141 MMHG | BODY MASS INDEX: 41.75 KG/M2 | DIASTOLIC BLOOD PRESSURE: 85 MMHG | WEIGHT: 315 LBS

## 2019-11-07 DIAGNOSIS — Z01.818 PRE-OP EXAM: Primary | ICD-10-CM

## 2019-11-07 DIAGNOSIS — Z01.818 PRE-OP EXAM: ICD-10-CM

## 2019-11-07 PROCEDURE — 99202 OFFICE O/P NEW SF 15 MIN: CPT | Performed by: INTERNAL MEDICINE

## 2019-11-07 PROCEDURE — 71046 X-RAY EXAM CHEST 2 VIEWS: CPT

## 2019-11-07 PROCEDURE — 94010 BREATHING CAPACITY TEST: CPT | Performed by: INTERNAL MEDICINE

## 2019-11-07 RX ORDER — LORATADINE 10 MG/1
10 CAPSULE, LIQUID FILLED ORAL DAILY
COMMUNITY
End: 2020-04-27

## 2019-11-07 RX ORDER — METFORMIN HYDROCHLORIDE 500 MG/1
500 TABLET, EXTENDED RELEASE ORAL DAILY
Refills: 0 | COMMUNITY
Start: 2019-11-01 | End: 2020-04-27

## 2019-11-07 RX ORDER — ALBUTEROL SULFATE 90 UG/1
2 AEROSOL, METERED RESPIRATORY (INHALATION) AS NEEDED
Refills: 2 | COMMUNITY
Start: 2019-11-01

## 2019-11-07 RX ORDER — GABAPENTIN 100 MG/1
600 CAPSULE ORAL 3 TIMES DAILY
Refills: 3 | COMMUNITY
Start: 2019-08-28

## 2019-11-07 NOTE — PROGRESS NOTES
Kurtis Valladares is a 53 y.o. male.     Chief Complaint   Patient presents with   • Surgical Clearance     Ref- Shira Worthington       History of Present Illness   This 54-year-old gentleman is here for a preop pulmonary evaluation prior to bariatric surgery.  He is gained a lot of weight in the past few years is over 300 pounds and would like to consider weight loss surgery.  He is beginning to have pain in his knees and feet and occasional episodes of wheezing.  He is also prediabetic.  Surgical history 9.  Medications please see his list.  Medication allergies unknown.  Family history is positive for high blood pressure.  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    Review of Systems   Constitutional: Negative for activity change, chills, fatigue, fever and unexpected weight change.   HENT: Negative for congestion, dental problem, ear discharge, ear pain, facial swelling, hearing loss, nosebleeds, postnasal drip, rhinorrhea, sinus pressure, sneezing, sore throat, tinnitus, trouble swallowing and voice change.    Eyes: Negative.  Negative for photophobia, pain, discharge, redness, itching and visual disturbance.   Respiratory: Positive for shortness of breath and wheezing. Negative for cough and chest tightness.    Cardiovascular: Negative for chest pain, palpitations and leg swelling.   Gastrointestinal: Negative for abdominal distention, abdominal pain and vomiting.   Endocrine: Negative.  Negative for cold intolerance, heat intolerance, polydipsia and polyphagia.   Genitourinary: Negative.  Negative for decreased urine volume, dysuria, enuresis, flank pain, frequency, hematuria and urgency.   Musculoskeletal: Positive for arthralgias and joint swelling.  "Negative for back pain, gait problem, myalgias and neck pain.   Skin: Negative for color change, pallor, rash and wound.   Allergic/Immunologic: Negative.  Negative for environmental allergies, food allergies and immunocompromised state.   Neurological: Negative.  Negative for dizziness, tremors, seizures, syncope, facial asymmetry, speech difficulty, weakness, light-headedness, numbness and headaches.   Hematological: Negative for adenopathy. Does not bruise/bleed easily.   Psychiatric/Behavioral: Negative for agitation, behavioral problems, confusion, decreased concentration, hallucinations and self-injury. The patient is not hyperactive.    All other systems reviewed and are negative.      /85   Pulse 83   Ht 185.4 cm (73\")   Wt (!) 165 kg (364 lb 1.6 oz)   SpO2 98%   BMI 48.04 kg/m²   Physical Exam   Constitutional: He appears well-developed and well-nourished. He appears distressed ( Morbidly obese white male otherwise in no distress).   HENT:   Head: Normocephalic.   Mouth/Throat: No oropharyngeal exudate.   Eyes: Conjunctivae are normal. Pupils are equal, round, and reactive to light. Right eye exhibits no discharge. Left eye exhibits no discharge. No scleral icterus.   Neck: Normal range of motion. Neck supple. No JVD present. No tracheal deviation present. No thyromegaly present.   Cardiovascular: Normal rate, regular rhythm, normal heart sounds and intact distal pulses. Exam reveals no gallop and no friction rub.   No murmur heard.  Pulmonary/Chest: Effort normal and breath sounds normal. No respiratory distress. He has no wheezes. He has no rales. He exhibits no tenderness.   Abdominal: Bowel sounds are normal. He exhibits no distension and no mass. There is no tenderness. There is no guarding.   Musculoskeletal: He exhibits no tenderness or deformity.   Lymphadenopathy:     He has no cervical adenopathy.   Neurological: He is alert. He has normal reflexes. He displays normal reflexes. No " cranial nerve deficit. He exhibits normal muscle tone. Coordination normal.   Skin: Skin is warm and dry. No rash noted. He is not diaphoretic. No pallor.   Psychiatric: He has a normal mood and affect. His behavior is normal. Judgment and thought content normal.   Nursing note and vitals reviewed.    Chest x-ray normal, spirometry is normal    Assessment/Plan   Kurtis was seen today for surgical clearance.    Diagnoses and all orders for this visit:    Pre-op exam  -     XR Chest 2 View; Future  -     Pulmonary Function Test    Assessment morbid obesity, arthralgias, preserved pulmonary functions    Recommendations this gentleman is an acceptable anesthesia and surgical candidate for bariatric surgery from a pulmonary standpoint        This document has been produced with the assistance of Gladys dictation  This document has been electronically signed by Ke Lozada MD on November 7, 2019 10:04 AM

## 2019-11-25 ENCOUNTER — OFFICE VISIT (OUTPATIENT)
Dept: CARDIOLOGY | Facility: CLINIC | Age: 54
End: 2019-11-25

## 2019-11-25 VITALS
WEIGHT: 315 LBS | DIASTOLIC BLOOD PRESSURE: 88 MMHG | HEIGHT: 73 IN | BODY MASS INDEX: 41.75 KG/M2 | SYSTOLIC BLOOD PRESSURE: 160 MMHG | OXYGEN SATURATION: 96 % | HEART RATE: 89 BPM

## 2019-11-25 DIAGNOSIS — R94.31 ABNORMAL EKG: ICD-10-CM

## 2019-11-25 DIAGNOSIS — I25.10 CORONARY ARTERY DISEASE INVOLVING NATIVE HEART WITHOUT ANGINA PECTORIS, UNSPECIFIED VESSEL OR LESION TYPE: Primary | ICD-10-CM

## 2019-11-25 DIAGNOSIS — Z01.818 PRE-OP EVALUATION: Primary | ICD-10-CM

## 2019-11-25 DIAGNOSIS — Z01.810 PREOP CARDIOVASCULAR EXAM: ICD-10-CM

## 2019-11-25 PROCEDURE — 99204 OFFICE O/P NEW MOD 45 MIN: CPT | Performed by: INTERNAL MEDICINE

## 2019-11-25 PROCEDURE — 93000 ELECTROCARDIOGRAM COMPLETE: CPT | Performed by: INTERNAL MEDICINE

## 2019-11-25 RX ORDER — POTASSIUM CHLORIDE 1500 MG/1
20 TABLET, FILM COATED, EXTENDED RELEASE ORAL DAILY
COMMUNITY
End: 2020-02-28

## 2019-11-25 NOTE — PROGRESS NOTES
[facility]  OFFICE CONSULT NOTE    Patient Name: Kurtis Valladares  Age/Sex: 54 y.o. male  : 1965  MRN: [id number]      Referring Provider: No referring provider defined for this encounter.        Chief Complaint: Preoperative clearance    History of Present Illness:  Kurtis Valladares is a 54 y.o. male he is here for preoperative clearance.  He is scheduled to have bariatric surgery in the future.  He denies any chest pain or shortness of air.  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  He has abnormal ECG bit he denies any pain at all.  His only issue is the morbid obesity which is causing him orthopedic issues.    Last Echo: None    Last Cath: None      Past Medical History:  Past Medical History:   Diagnosis Date   • Acute bronchitis    • Asthma    • Dyspnea    • Hypertension    • Pain in lower limb    • Sprain of ankle        PAST SURGERY   Past Surgical History:   Procedure Laterality Date   • OTHER SURGICAL HISTORY  2005    Arm Surgery (1) -  removal. foreign body right antecubital fossa, penetrating wound       Home Medications:      Current Outpatient Medications:   •  albuterol sulfate  (90 Base) MCG/ACT inhaler, Inhale 2 puffs As Needed., Disp: , Rfl: 2  •  amLODIPine (NORVASC) 10 MG tablet, Take 1 tablet by mouth Daily., Disp: 30 tablet, Rfl: 0  •  aspirin 81 MG tablet, Take 81 mg by mouth Daily., Disp: , Rfl:   •  diclofenac (VOLTAREN) 50 MG EC tablet, , Disp: , Rfl: 1  •  gabapentin (NEURONTIN) 100 MG capsule, Take 600 mg by mouth 3 (Three) Times a Day., Disp: , Rfl: 3  •  hydrochlorothiazide (HYDRODIURIL) 25 MG tablet, Take 1 tablet by mouth Daily., Disp: 30 tablet, Rfl: 0  •  lisinopril (PRINIVIL,ZESTRIL) 40 MG tablet, Take 1 tablet by mouth 2 (Two) Times a Day., Disp: 60 tablet, Rfl: 0  •  Loratadine 10 MG capsule, Take 10 mg by mouth Daily.,  Disp: , Rfl:   •  magnesium oxide (MAGOX) 400 (241.3 Mg) MG tablet tablet, Take 400 mg by mouth Daily., Disp: , Rfl:   •  metFORMIN ER (GLUCOPHAGE-XR) 500 MG 24 hr tablet, Take 500 mg by mouth Daily., Disp: , Rfl: 0  •  minocycline (MINOCIN,DYNACIN) 100 MG capsule, Take 100 mg by mouth 2 (Two) Times a Day., Disp: , Rfl:   •  omeprazole (priLOSEC) 20 MG capsule, Take 20 mg by mouth Daily., Disp: , Rfl:   •  potassium chloride ER (K-TAB) 20 MEQ tablet controlled-release ER tablet, Take 20 mEq by mouth Daily., Disp: , Rfl:     Current Facility-Administered Medications:   •  betamethasone acetate-betamethasone sodium phosphate (CELESTONE SOLUSPAN) injection 6 mg, 6 mg, Intramuscular, Once, Jono Bucio DPM  •  bupivacaine (PF) (MARCAINE) 0.5 % injection 1 mL, 1 mL, Injection, Once, Jono Bucio DPM    Allergies:  No Known Allergies     Social History:  Social History     Socioeconomic History   • Marital status:      Spouse name: Not on file   • Number of children: Not on file   • Years of education: Not on file   • Highest education level: Not on file   Tobacco Use   • Smoking status: Former Smoker     Last attempt to quit: 2005     Years since quittin.9   • Smokeless tobacco: Never Used   Substance and Sexual Activity   • Alcohol use: Yes     Alcohol/week: 1.2 oz     Types: 2 Cans of beer per week   • Drug use: No   • Sexual activity: Defer        Family History:  Family History   Problem Relation Age of Onset   • Hypertension Other    • Hypertension Mother    • Hypertension Father          Review of Systems:   Constitution: No chills, no rigors, no unexplained weight loss or weight gain    Eyes:  No diplopia, no blurred vision, no loss of vision, conjunctiva is pink and sclera is anicteric    ENT:  No tinnitus, no otorrhea, no epistaxis, no sore throat   Respiratory: No cough, no hemoptysis    Cardiovascular:  As mentioned in HPI    Gastrointestinal: No nausea, no vomiting, no  hematemesis, no diarrhea or constipation, no melena    Genitourinary: No frequency of dysuria no hematuria    Integument: positive for  No pruritis and  no skin rash    Hematologic / Lymphatic: No excessive bleeding, easy bruising, fatigue, lymphadenopathy and petechiae    Musculoskeletal: No joint pain, joint stiffness, joint swelling, muscle pain, muscle weakness and neck pain    Neurological: No dizziness, headaches, light headedness, seizures and vertigo or stroke    Behavioral/Psych: No depression, or bipolar disorder    Endocrine: no h/o diabetes, hyperlipidemia or thyroid problems        Vitals:    11/25/19 1131   BP: 160/88   Pulse: 89   SpO2: 96%       Body mass index is 47.26 kg/m².          Physical Exam:   General Appearance:    Alert, oriented, cooperative, in no acute distress     Head:    Normocephalic, atraumatic, without obvious abnormality     Eyes:            Lids and lashes normal, conjunctivae and sclerae normal, no   icterus, no pallor     Ears:    Ears appear intact with no abnormalities noted     Throat:   Mucous membranes pink and moist     Neck:   Supple, trachea midline, no carotid bruit, no JVD     Lungs:     Air enty equal,  Clear to auscultation, respirations regular, Unlabored. No wheezes, rales, rhonchi    Heart:    Regular rhythm and normal rate, normal S1 and S2, no            murmur, no gallop,      Abdomen:     Normal bowel sounds, no masses, liver and spleen nonpalpable, soft, non-tender, non-distended, no guarding, no rebound tenderness       Extremities:   Moves all extremities well, no edema, no cyanosis, no              Redness, no clubbing     Pulses:   Pulses palpable and equal bilaterally       Neurologic:   Alert and oriented to person, place, and time. No focal neurological deficits       Lab Review:     No visits with results within 6 Month(s) from this visit.   Latest known visit with results is:   Lab on 11/05/2018   Component Date Value Ref Range Status   • Body  Fluid Culture 11/05/2018 No growth at 14 days   Corrected   • Gram Stain 11/05/2018 Rare (1+) WBCs seen   Final   • Gram Stain 11/05/2018 No organisms seen   Final   • WBC, Fluid 11/05/2018 117* 0 - 5 /mm3 Final   • RBC, Fluid 11/05/2018 37* 0 - 0 /mm3 Final   • Color, Fluid 11/05/2018 Yellow   Final   • Appearance, Fluid 11/05/2018 Clear  Clear Final   • Volume, Fluid 11/05/2018 30.0  mL Final   • Neutrophils, Fluid 11/05/2018 24  % Final   • Mononuclear, Fluid 11/05/2018 76  % Final   ]    Assessment/Plan     1. Coronary artery disease involving native heart without angina pectoris, unspecified vessel or lesion type  Is ECG it has Q waves in lead III and small and aVF with nonspecific ST-T wave changes.  We would need to assume that this is coronary disease until proven otherwise.  I would like to do a 2-day Cardiolite and echo.  - Adult Transthoracic Echo Complete W/ Cont if Necessary Per Protocol  - Stress Test With Myocardial Perfusion Two Day    2. Abnormal EKG  See above.  We will try to obtain old ECG from 2014 her before.  - Adult Transthoracic Echo Complete W/ Cont if Necessary Per Protocol  - Stress Test With Myocardial Perfusion Two Day    3. Preop cardiovascular exam  We will await the outcome of his noninvasive cardiac testing.  - Adult Transthoracic Echo Complete W/ Cont if Necessary Per Protocol  - Stress Test With Myocardial Perfusion Two Day        Return in about 1 year (around 11/25/2020) for Next scheduled follow up.

## 2019-12-03 ENCOUNTER — APPOINTMENT (OUTPATIENT)
Dept: NUCLEAR MEDICINE | Facility: HOSPITAL | Age: 54
End: 2019-12-03

## 2019-12-04 ENCOUNTER — APPOINTMENT (OUTPATIENT)
Dept: NUCLEAR MEDICINE | Facility: HOSPITAL | Age: 54
End: 2019-12-04

## 2019-12-04 ENCOUNTER — APPOINTMENT (OUTPATIENT)
Dept: CARDIOLOGY | Facility: HOSPITAL | Age: 54
End: 2019-12-04

## 2019-12-06 ENCOUNTER — TELEPHONE (OUTPATIENT)
Dept: CARDIOLOGY | Facility: CLINIC | Age: 54
End: 2019-12-06

## 2019-12-06 LAB
BH CV ECHO MEAS - ACS: 2.4 CM
BH CV ECHO MEAS - AO ISTHMUS: 3.1 CM
BH CV ECHO MEAS - AO MAX PG (FULL): 3.4 MMHG
BH CV ECHO MEAS - AO MAX PG: 6.4 MMHG
BH CV ECHO MEAS - AO MEAN PG (FULL): 2 MMHG
BH CV ECHO MEAS - AO MEAN PG: 4 MMHG
BH CV ECHO MEAS - AO ROOT AREA (BSA CORRECTED): 1.3
BH CV ECHO MEAS - AO ROOT AREA: 10.2 CM^2
BH CV ECHO MEAS - AO ROOT DIAM: 3.6 CM
BH CV ECHO MEAS - AO V2 MAX: 126 CM/SEC
BH CV ECHO MEAS - AO V2 MEAN: 92.4 CM/SEC
BH CV ECHO MEAS - AO V2 VTI: 28.7 CM
BH CV ECHO MEAS - ASC AORTA: 3.3 CM
BH CV ECHO MEAS - AVA(I,A): 3.7 CM^2
BH CV ECHO MEAS - AVA(I,D): 3.7 CM^2
BH CV ECHO MEAS - AVA(V,A): 3.9 CM^2
BH CV ECHO MEAS - AVA(V,D): 3.9 CM^2
BH CV ECHO MEAS - BSA(HAYCOCK): 3 M^2
BH CV ECHO MEAS - BSA: 2.8 M^2
BH CV ECHO MEAS - BZI_BMI: 47.2 KILOGRAMS/M^2
BH CV ECHO MEAS - BZI_METRIC_HEIGHT: 185.4 CM
BH CV ECHO MEAS - BZI_METRIC_WEIGHT: 162.4 KG
BH CV ECHO MEAS - EDV(CUBED): 171.9 ML
BH CV ECHO MEAS - EDV(MOD-SP2): 141 ML
BH CV ECHO MEAS - EDV(MOD-SP4): 182 ML
BH CV ECHO MEAS - EDV(TEICH): 151.2 ML
BH CV ECHO MEAS - EF(CUBED): 67.8 %
BH CV ECHO MEAS - EF(MOD-SP2): 51.2 %
BH CV ECHO MEAS - EF(MOD-SP4): 58.4 %
BH CV ECHO MEAS - EF(TEICH): 58.8 %
BH CV ECHO MEAS - EPSS: 0.5 CM
BH CV ECHO MEAS - ESV(CUBED): 55.3 ML
BH CV ECHO MEAS - ESV(MOD-SP2): 68.8 ML
BH CV ECHO MEAS - ESV(MOD-SP4): 75.7 ML
BH CV ECHO MEAS - ESV(TEICH): 62.3 ML
BH CV ECHO MEAS - FS: 31.5 %
BH CV ECHO MEAS - IVS/LVPW: 2.1
BH CV ECHO MEAS - IVSD: 2.8 CM
BH CV ECHO MEAS - LA DIMENSION: 4 CM
BH CV ECHO MEAS - LA/AO: 1.1
BH CV ECHO MEAS - LV DIASTOLIC VOL/BSA (35-75): 66 ML/M^2
BH CV ECHO MEAS - LV MASS(C)D: 594.8 GRAMS
BH CV ECHO MEAS - LV MASS(C)DI: 215.8 GRAMS/M^2
BH CV ECHO MEAS - LV MAX PG: 3 MMHG
BH CV ECHO MEAS - LV MEAN PG: 2 MMHG
BH CV ECHO MEAS - LV SYSTOLIC VOL/BSA (12-30): 27.5 ML/M^2
BH CV ECHO MEAS - LV V1 MAX: 86.2 CM/SEC
BH CV ECHO MEAS - LV V1 MEAN: 59.1 CM/SEC
BH CV ECHO MEAS - LV V1 VTI: 18.5 CM
BH CV ECHO MEAS - LVIDD: 5.6 CM
BH CV ECHO MEAS - LVIDS: 3.8 CM
BH CV ECHO MEAS - LVLD AP2: 8.4 CM
BH CV ECHO MEAS - LVLD AP4: 9.2 CM
BH CV ECHO MEAS - LVLS AP2: 7.3 CM
BH CV ECHO MEAS - LVLS AP4: 7.4 CM
BH CV ECHO MEAS - LVOT AREA (M): 5.7 CM^2
BH CV ECHO MEAS - LVOT AREA: 5.7 CM^2
BH CV ECHO MEAS - LVOT DIAM: 2.7 CM
BH CV ECHO MEAS - LVPWD: 1.3 CM
BH CV ECHO MEAS - MV A MAX VEL: 78 CM/SEC
BH CV ECHO MEAS - MV DEC SLOPE: 584 CM/SEC^2
BH CV ECHO MEAS - MV E MAX VEL: 96.4 CM/SEC
BH CV ECHO MEAS - MV E/A: 1.2
BH CV ECHO MEAS - MV MAX PG: 4.8 MMHG
BH CV ECHO MEAS - MV MEAN PG: 2 MMHG
BH CV ECHO MEAS - MV P1/2T MAX VEL: 112 CM/SEC
BH CV ECHO MEAS - MV P1/2T: 56.2 MSEC
BH CV ECHO MEAS - MV V2 MAX: 109 CM/SEC
BH CV ECHO MEAS - MV V2 MEAN: 64 CM/SEC
BH CV ECHO MEAS - MV V2 VTI: 30.3 CM
BH CV ECHO MEAS - MVA P1/2T LCG: 2 CM^2
BH CV ECHO MEAS - MVA(P1/2T): 3.9 CM^2
BH CV ECHO MEAS - MVA(VTI): 3.5 CM^2
BH CV ECHO MEAS - PA MAX PG: 4.8 MMHG
BH CV ECHO MEAS - PA MEAN PG: 3 MMHG
BH CV ECHO MEAS - PA V2 MAX: 109 CM/SEC
BH CV ECHO MEAS - PA V2 MEAN: 74.3 CM/SEC
BH CV ECHO MEAS - PA V2 VTI: 23.2 CM
BH CV ECHO MEAS - RVDD: 4.2 CM
BH CV ECHO MEAS - SI(AO): 106 ML/M^2
BH CV ECHO MEAS - SI(CUBED): 42.3 ML/M^2
BH CV ECHO MEAS - SI(LVOT): 38.4 ML/M^2
BH CV ECHO MEAS - SI(MOD-SP2): 26.2 ML/M^2
BH CV ECHO MEAS - SI(MOD-SP4): 38.6 ML/M^2
BH CV ECHO MEAS - SI(TEICH): 32.2 ML/M^2
BH CV ECHO MEAS - SV(AO): 292.1 ML
BH CV ECHO MEAS - SV(CUBED): 116.6 ML
BH CV ECHO MEAS - SV(LVOT): 105.9 ML
BH CV ECHO MEAS - SV(MOD-SP2): 72.2 ML
BH CV ECHO MEAS - SV(MOD-SP4): 106.3 ML
BH CV ECHO MEAS - SV(TEICH): 88.8 ML

## 2019-12-06 NOTE — TELEPHONE ENCOUNTER
The patient has been contacted and told the reason why he needs the nuclear stress test and he has agreed to have it done. The apt has been scheduled and the patient is aware.

## 2019-12-06 NOTE — TELEPHONE ENCOUNTER
----- Message from Fifi Crandall sent at 12/4/2019 12:13 PM CST -----  Contact: [phone]  Pt has changed his mind about the nuclear and wants a plain treadmill. Can we get an order put in and let me know so I can call him with the date

## 2019-12-17 ENCOUNTER — HOSPITAL ENCOUNTER (OUTPATIENT)
Dept: NUCLEAR MEDICINE | Facility: HOSPITAL | Age: 54
Discharge: HOME | End: 2019-12-17

## 2019-12-17 PROCEDURE — 93018 CV STRESS TEST I&R ONLY: CPT | Performed by: INTERNAL MEDICINE

## 2019-12-17 PROCEDURE — 78452 HT MUSCLE IMAGE SPECT MULT: CPT

## 2019-12-17 PROCEDURE — 78452 HT MUSCLE IMAGE SPECT MULT: CPT | Performed by: INTERNAL MEDICINE

## 2019-12-17 PROCEDURE — 0 TECHNETIUM SESTAMIBI: Performed by: INTERNAL MEDICINE

## 2019-12-17 PROCEDURE — A9500 TC99M SESTAMIBI: HCPCS | Performed by: INTERNAL MEDICINE

## 2019-12-17 PROCEDURE — 93016 CV STRESS TEST SUPVJ ONLY: CPT | Performed by: INTERNAL MEDICINE

## 2019-12-17 PROCEDURE — 93017 CV STRESS TEST TRACING ONLY: CPT

## 2019-12-17 RX ADMIN — TECHNETIUM TC 99M SESTAMIBI 1 DOSE: 1 INJECTION INTRAVENOUS at 07:17

## 2019-12-18 ENCOUNTER — HOSPITAL ENCOUNTER (OUTPATIENT)
Dept: NUCLEAR MEDICINE | Facility: HOSPITAL | Age: 54
Discharge: HOME | End: 2019-12-18

## 2019-12-18 ENCOUNTER — HOSPITAL ENCOUNTER (OUTPATIENT)
Dept: CARDIOLOGY | Facility: HOSPITAL | Age: 54
Discharge: HOME | End: 2019-12-18

## 2019-12-18 LAB
BH CV STRESS BP STAGE 1: NORMAL
BH CV STRESS COMMENTS STAGE 1: NORMAL
BH CV STRESS DOSE REGADENOSON STAGE 1: 0.4
BH CV STRESS DURATION MIN STAGE 1: 0
BH CV STRESS DURATION SEC STAGE 1: 10
BH CV STRESS HR STAGE 1: 90
BH CV STRESS PROTOCOL 1: NORMAL
BH CV STRESS RECOVERY BP: NORMAL MMHG
BH CV STRESS RECOVERY HR: 88 BPM
BH CV STRESS STAGE 1: 1
LV EF NUC BP: 67 %
MAXIMAL PREDICTED HEART RATE: 167 BPM
PERCENT MAX PREDICTED HR: 64.67 %
STRESS BASELINE BP: NORMAL MMHG
STRESS BASELINE HR: 89 BPM
STRESS PERCENT HR: 76 %
STRESS POST ESTIMATED WORKLOAD: 1 METS
STRESS POST PEAK BP: NORMAL MMHG
STRESS POST PEAK HR: 108 BPM
STRESS TARGET HR: 142 BPM

## 2019-12-18 PROCEDURE — A9500 TC99M SESTAMIBI: HCPCS | Performed by: INTERNAL MEDICINE

## 2019-12-18 PROCEDURE — 0 TECHNETIUM SESTAMIBI: Performed by: INTERNAL MEDICINE

## 2019-12-18 PROCEDURE — 25010000002 REGADENOSON 0.4 MG/5ML SOLUTION: Performed by: INTERNAL MEDICINE

## 2019-12-18 RX ORDER — SODIUM CHLORIDE 0.9 % (FLUSH) 0.9 %
10 SYRINGE (ML) INJECTION ONCE
Status: COMPLETED | OUTPATIENT
Start: 2019-12-18 | End: 2019-12-18

## 2019-12-18 RX ADMIN — TECHNETIUM TC 99M SESTAMIBI 1 DOSE: 1 INJECTION INTRAVENOUS at 08:15

## 2019-12-18 RX ADMIN — REGADENOSON 0.4 MG: 0.08 INJECTION, SOLUTION INTRAVENOUS at 08:14

## 2019-12-18 RX ADMIN — SODIUM CHLORIDE, PRESERVATIVE FREE 10 ML: 5 INJECTION INTRAVENOUS at 08:14

## 2019-12-23 ENCOUNTER — TELEPHONE (OUTPATIENT)
Dept: CARDIOLOGY | Facility: CLINIC | Age: 54
End: 2019-12-23

## 2019-12-27 DIAGNOSIS — R94.39 ABNORMAL NUCLEAR STRESS TEST: ICD-10-CM

## 2019-12-27 DIAGNOSIS — Z01.818 PRE-OP EVALUATION: Primary | ICD-10-CM

## 2019-12-27 RX ORDER — SODIUM CHLORIDE 9 MG/ML
100 INJECTION, SOLUTION INTRAVENOUS CONTINUOUS
Status: CANCELLED | OUTPATIENT
Start: 2020-01-15

## 2019-12-27 NOTE — PROGRESS NOTES
Abnormal nuclear discussed with patient. Left heart cath recommended per Dr Dominguez. Procedure explained to patient. Dr Dominguez has also discussed with Patient. Per Dr Dominguez patient has agreed to heart cath and would prefer radial approach if possible. Patient wants to be scheduled for January 15th, however would like all tests and notes sent to his surgeon to see if he thinks a heart cath is necessary. He wants his heart cath instructions sent via mail and states he will call if he has questions or needs to cancel heart cath.

## 2020-01-15 ENCOUNTER — HOSPITAL ENCOUNTER (OUTPATIENT)
Facility: HOSPITAL | Age: 55
Setting detail: HOSPITAL OUTPATIENT SURGERY
Discharge: HOME | End: 2020-01-15
Attending: INTERNAL MEDICINE | Admitting: INTERNAL MEDICINE

## 2020-01-15 VITALS
SYSTOLIC BLOOD PRESSURE: 147 MMHG | DIASTOLIC BLOOD PRESSURE: 78 MMHG | OXYGEN SATURATION: 96 % | RESPIRATION RATE: 13 BRPM | WEIGHT: 315 LBS | HEART RATE: 74 BPM | TEMPERATURE: 97.7 F | BODY MASS INDEX: 42.66 KG/M2 | HEIGHT: 72 IN

## 2020-01-15 DIAGNOSIS — Z01.818 PRE-OP EVALUATION: ICD-10-CM

## 2020-01-15 DIAGNOSIS — R94.39 ABNORMAL NUCLEAR STRESS TEST: ICD-10-CM

## 2020-01-15 LAB
ANION GAP SERPL CALCULATED.3IONS-SCNC: 13 MMOL/L (ref 5–15)
BUN BLD-MCNC: 16 MG/DL (ref 6–20)
BUN/CREAT SERPL: 16.3 (ref 7–25)
CALCIUM SPEC-SCNC: 9.3 MG/DL (ref 8.6–10.5)
CHLORIDE SERPL-SCNC: 99 MMOL/L (ref 98–107)
CO2 SERPL-SCNC: 29 MMOL/L (ref 22–29)
CREAT BLD-MCNC: 0.98 MG/DL (ref 0.76–1.27)
DEPRECATED RDW RBC AUTO: 40.8 FL (ref 37–54)
ERYTHROCYTE [DISTWIDTH] IN BLOOD BY AUTOMATED COUNT: 13.4 % (ref 12.3–15.4)
GFR SERPL CREATININE-BSD FRML MDRD: 80 ML/MIN/1.73
GLUCOSE BLD-MCNC: 189 MG/DL (ref 65–99)
HCT VFR BLD AUTO: 36.4 % (ref 37.5–51)
HGB BLD-MCNC: 12.8 G/DL (ref 13–17.7)
INR PPP: 0.96 (ref 0.8–1.2)
MCH RBC QN AUTO: 29.5 PG (ref 26.6–33)
MCHC RBC AUTO-ENTMCNC: 35.2 G/DL (ref 31.5–35.7)
MCV RBC AUTO: 83.9 FL (ref 79–97)
PLATELET # BLD AUTO: 186 10*3/MM3 (ref 140–450)
PMV BLD AUTO: 10.5 FL (ref 6–12)
POTASSIUM BLD-SCNC: 2.9 MMOL/L (ref 3.5–5.2)
PROTHROMBIN TIME: 12.6 SECONDS (ref 11.1–15.3)
RBC # BLD AUTO: 4.34 10*6/MM3 (ref 4.14–5.8)
SODIUM BLD-SCNC: 141 MMOL/L (ref 136–145)
WBC NRBC COR # BLD: 6.94 10*3/MM3 (ref 3.4–10.8)

## 2020-01-15 PROCEDURE — 85027 COMPLETE CBC AUTOMATED: CPT | Performed by: INTERNAL MEDICINE

## 2020-01-15 PROCEDURE — 80048 BASIC METABOLIC PNL TOTAL CA: CPT | Performed by: INTERNAL MEDICINE

## 2020-01-15 PROCEDURE — 85610 PROTHROMBIN TIME: CPT | Performed by: INTERNAL MEDICINE

## 2020-01-15 RX ORDER — SODIUM CHLORIDE 9 MG/ML
100 INJECTION, SOLUTION INTRAVENOUS CONTINUOUS
Status: DISCONTINUED | OUTPATIENT
Start: 2020-01-15 | End: 2020-01-15 | Stop reason: HOSPADM

## 2020-01-15 NOTE — NURSING NOTE
Patient refuses to take off underwear, states he is going in the wrist and is not using the groin.  Also, states he took his metformin this morning.  States was not told to stop.

## 2020-01-15 NOTE — NURSING NOTE
"Reinforced patient education per Dr Dominguez's order to \"take 2 additional potassium pills today then continue taking 2 pills per day thereafter.\" Pt verbalizes understanding and states MD did instruct him to do the same when in room.   "

## 2020-01-16 DIAGNOSIS — Z01.810 PREOP CARDIOVASCULAR EXAM: Primary | ICD-10-CM

## 2020-01-16 DIAGNOSIS — E87.6 HYPOKALEMIA: Primary | ICD-10-CM

## 2020-01-16 DIAGNOSIS — R94.39 ABNORMAL NUCLEAR STRESS TEST: ICD-10-CM

## 2020-01-16 DIAGNOSIS — Z01.810 PREOP CARDIOVASCULAR EXAM: ICD-10-CM

## 2020-01-16 RX ORDER — SODIUM CHLORIDE 9 MG/ML
100 INJECTION, SOLUTION INTRAVENOUS CONTINUOUS
Status: CANCELLED | OUTPATIENT
Start: 2020-01-22

## 2020-01-20 ENCOUNTER — TELEPHONE (OUTPATIENT)
Dept: CARDIOLOGY | Facility: CLINIC | Age: 55
End: 2020-01-20

## 2020-01-20 ENCOUNTER — LAB (OUTPATIENT)
Dept: LAB | Facility: HOSPITAL | Age: 55
End: 2020-01-20

## 2020-01-20 DIAGNOSIS — E87.6 HYPOKALEMIA: ICD-10-CM

## 2020-01-20 DIAGNOSIS — Z01.810 PREOP CARDIOVASCULAR EXAM: ICD-10-CM

## 2020-01-20 LAB
ANION GAP SERPL CALCULATED.3IONS-SCNC: 13 MMOL/L (ref 5–15)
BUN BLD-MCNC: 16 MG/DL (ref 6–20)
BUN/CREAT SERPL: 14 (ref 7–25)
CALCIUM SPEC-SCNC: 9.5 MG/DL (ref 8.6–10.5)
CHLORIDE SERPL-SCNC: 100 MMOL/L (ref 98–107)
CO2 SERPL-SCNC: 29 MMOL/L (ref 22–29)
CREAT BLD-MCNC: 1.14 MG/DL (ref 0.76–1.27)
GFR SERPL CREATININE-BSD FRML MDRD: 67 ML/MIN/1.73
GLUCOSE BLD-MCNC: 299 MG/DL (ref 65–99)
POTASSIUM BLD-SCNC: 3.5 MMOL/L (ref 3.5–5.2)
SODIUM BLD-SCNC: 142 MMOL/L (ref 136–145)

## 2020-01-20 PROCEDURE — 36415 COLL VENOUS BLD VENIPUNCTURE: CPT

## 2020-01-20 PROCEDURE — 80048 BASIC METABOLIC PNL TOTAL CA: CPT

## 2020-01-20 NOTE — CONSULTS
[facility] Cardiology  HISTORY AND PHYSICAL  Kurtis Valladares  54 y.o. male    Chief complaint: preop clearance , abnormal stress test  History of Present Illness: He was being worked up for possible gastric surgery.  He is seen today on January 15 to have all this explained to him.  His potassium was 2.9 and he had eaten so we are going to reschedule him for the .  As he was undergoing a work-up he had a abnormal stress test.  After discussions he was agreeable to go ahead and proceeding with cardiac catheterization.  Risk and benefits including heart attack, stroke, bypass surgery, bleeding, infection, and death were explained to him.  He had signed his consent.  He prefers us using the radial approach.  No Known Allergies    Past Medical History:   Diagnosis Date   • Acute bronchitis    • Asthma    • Dyspnea    • Hypertension    • Pain in lower limb    • Sprain of ankle        Past Surgical History:   Procedure Laterality Date   • OTHER SURGICAL HISTORY  2005    Arm Surgery (1) -  removal. foreign body right antecubital fossa, penetrating wound       Family History   Problem Relation Age of Onset   • Hypertension Other    • Hypertension Mother    • Hypertension Father        Social History     Socioeconomic History   • Marital status:      Spouse name: Not on file   • Number of children: Not on file   • Years of education: Not on file   • Highest education level: Not on file   Tobacco Use   • Smoking status: Former Smoker     Last attempt to quit: 2005     Years since quittin.0   • Smokeless tobacco: Never Used   Substance and Sexual Activity   • Alcohol use: Yes     Alcohol/week: 2.0 standard drinks     Types: 2 Cans of beer per week   • Drug use: No   • Sexual activity: Defer       Prior to Admission medications    Medication Sig Start Date End Date Taking? Authorizing Provider   albuterol sulfate  (90 Base) MCG/ACT inhaler Inhale 2 puffs As Needed.  11/1/19   Mercedes Casey MD   amLODIPine (NORVASC) 10 MG tablet Take 1 tablet by mouth Daily. 1/4/17   Jono Lara MD   aspirin 81 MG tablet Take 81 mg by mouth Daily.    Mercedes Casey MD   diclofenac (VOLTAREN) 50 MG EC tablet  10/17/19   Mercedes Casey MD   gabapentin (NEURONTIN) 100 MG capsule Take 600 mg by mouth 3 (Three) Times a Day. 8/28/19   Mercedes Casey MD   hydrochlorothiazide (HYDRODIURIL) 25 MG tablet Take 1 tablet by mouth Daily. 1/4/17   Jono Lara MD   lisinopril (PRINIVIL,ZESTRIL) 40 MG tablet Take 1 tablet by mouth 2 (Two) Times a Day. 1/4/17   Jono Lara MD   Loratadine 10 MG capsule Take 10 mg by mouth Daily.    Mercedes Casey MD   magnesium oxide (MAGOX) 400 (241.3 Mg) MG tablet tablet Take 400 mg by mouth Daily.    Mercedes Casey MD   metFORMIN ER (GLUCOPHAGE-XR) 500 MG 24 hr tablet Take 500 mg by mouth Daily. 11/1/19   Mercedes Casey MD   minocycline (MINOCIN,DYNACIN) 100 MG capsule Take 100 mg by mouth 2 (Two) Times a Day.    Mercedes Casey MD   omeprazole (priLOSEC) 20 MG capsule Take 20 mg by mouth Daily.    Mercedes Casey MD   potassium chloride ER (K-TAB) 20 MEQ tablet controlled-release ER tablet Take 20 mEq by mouth Daily.    Mercedes Casey MD       Review of Systems:     Constitution: Denies any fatigue, fever or chills.    HENT: Denies any headache, hearing impairment.    Eyes: Denies any blurring of vision, or photophobia.     Cardiovascular:  As per history of present illness.     Respiratory system: Denies any COPD, shortness of breath.     Endocrine:  No history of hyperlipidemia, diabetes.       Musculoskeletal:  No history of arthritis with musculoskeletal problems.    Gastrointestinal: No nausea, vomiting, or melena.    Genitourinary: No dysuria or hematuria.    Neurological: No history of seizure disorder, stroke, or memory problems.    Psychiatric/Behavioral: No history of depression,  bipolar disorder or schizophrenia .    Hematological: No history of easy bruising.    OBJECTIVE:    There were no vitals taken for this visit.    Physical Exam:     Constitutional: Patient is oriented to person, place, and time.     Skin: Warm and dry.    Well developed and nourished in no acute distress .     Head: Normocephalic and atraumatic.     Eyes: Pupils are equal, round, and reactive to light.     Neck: Neck supple. No bruit in the carotids, no elevation of JVD.    Cardiovascular: [place] in the fifth intercostal space, regular rate, and rhythm,  S1 greater than S2, no S3 or S4, no gallop.    Pulmonary/Chest: Air entry is equal on both sides.  No wheezing or crackles.     Abdominal: Soft.  No hepatosplenomegaly, bowel sounds are present.    Musculoskeletal: No kyphoscoliosis.    Neurological: Alert and oriented to person, place, and time.  Cranial nerve are intact. No motor or sensory deficit.    Extremities: No edema, no radial femoral delay.    Psychiatric: Normal mood and affect. Behavior is normal.    Lab Results (last 24 hours)     Procedure Component Value Units Date/Time    Basic Metabolic Panel [[id number]]  (Abnormal) Collected:  01/20/20 1130    Specimen:  Blood Updated:  01/20/20 1318     Glucose 299 mg/dL      BUN 16 mg/dL      Creatinine 1.14 mg/dL      Sodium 142 mmol/L      Potassium 3.5 mmol/L      Chloride 100 mmol/L      CO2 29.0 mmol/L      Calcium 9.5 mg/dL      eGFR Non African Amer 67 mL/min/1.73      BUN/Creatinine Ratio 14.0     Anion Gap 13.0 mmol/L     Narrative:       GFR Normal >60  Chronic Kidney Disease <60  Kidney Failure <15            A/P:  1.  Abnormal cardiac stress test-    Patient is being worked up for possible bariatric surgery.  I have discussed with him the cardiac catheterization including the radial approach.  The risk includes loss of pulse infection bleeding heart attack stroke and death need for bypass surgery need for arm surgery.  He is agreeable and would prefer  for us to do that.  ASA2 OPAL 2        Hawa Dominguez MD  1/20/2020  2:14 PM    Part of this note may be an electronic transcription/translation of spoken language to printed text using the Dragon Dictation System.

## 2020-01-20 NOTE — PROGRESS NOTES
I am to stay on the extra potassium for his cath.  His potassium is much better.  We will see him Wednesday.

## 2020-01-20 NOTE — H&P (VIEW-ONLY)
[facility] Cardiology  HISTORY AND PHYSICAL  Kurtis Valladares  54 y.o. male    Chief complaint: preop clearance , abnormal stress test  History of Present Illness: He was being worked up for possible gastric surgery.  He is seen today on January 15 to have all this explained to him.  His potassium was 2.9 and he had eaten so we are going to reschedule him for the .  As he was undergoing a work-up he had a abnormal stress test.  After discussions he was agreeable to go ahead and proceeding with cardiac catheterization.  Risk and benefits including heart attack, stroke, bypass surgery, bleeding, infection, and death were explained to him.  He had signed his consent.  He prefers us using the radial approach.  No Known Allergies    Past Medical History:   Diagnosis Date   • Acute bronchitis    • Asthma    • Dyspnea    • Hypertension    • Pain in lower limb    • Sprain of ankle        Past Surgical History:   Procedure Laterality Date   • OTHER SURGICAL HISTORY  2005    Arm Surgery (1) -  removal. foreign body right antecubital fossa, penetrating wound       Family History   Problem Relation Age of Onset   • Hypertension Other    • Hypertension Mother    • Hypertension Father        Social History     Socioeconomic History   • Marital status:      Spouse name: Not on file   • Number of children: Not on file   • Years of education: Not on file   • Highest education level: Not on file   Tobacco Use   • Smoking status: Former Smoker     Last attempt to quit: 2005     Years since quittin.0   • Smokeless tobacco: Never Used   Substance and Sexual Activity   • Alcohol use: Yes     Alcohol/week: 2.0 standard drinks     Types: 2 Cans of beer per week   • Drug use: No   • Sexual activity: Defer       Prior to Admission medications    Medication Sig Start Date End Date Taking? Authorizing Provider   albuterol sulfate  (90 Base) MCG/ACT inhaler Inhale 2 puffs As Needed.  11/1/19   Mercedes Casey MD   amLODIPine (NORVASC) 10 MG tablet Take 1 tablet by mouth Daily. 1/4/17   Jono Lara MD   aspirin 81 MG tablet Take 81 mg by mouth Daily.    Mercedes Casey MD   diclofenac (VOLTAREN) 50 MG EC tablet  10/17/19   Mercedes Casey MD   gabapentin (NEURONTIN) 100 MG capsule Take 600 mg by mouth 3 (Three) Times a Day. 8/28/19   Mercedes Casey MD   hydrochlorothiazide (HYDRODIURIL) 25 MG tablet Take 1 tablet by mouth Daily. 1/4/17   Jono Lara MD   lisinopril (PRINIVIL,ZESTRIL) 40 MG tablet Take 1 tablet by mouth 2 (Two) Times a Day. 1/4/17   Jono Lara MD   Loratadine 10 MG capsule Take 10 mg by mouth Daily.    Mercedes Casey MD   magnesium oxide (MAGOX) 400 (241.3 Mg) MG tablet tablet Take 400 mg by mouth Daily.    Mercedes Casey MD   metFORMIN ER (GLUCOPHAGE-XR) 500 MG 24 hr tablet Take 500 mg by mouth Daily. 11/1/19   Mercedes Casey MD   minocycline (MINOCIN,DYNACIN) 100 MG capsule Take 100 mg by mouth 2 (Two) Times a Day.    Mercedes Casey MD   omeprazole (priLOSEC) 20 MG capsule Take 20 mg by mouth Daily.    Mercedes Casey MD   potassium chloride ER (K-TAB) 20 MEQ tablet controlled-release ER tablet Take 20 mEq by mouth Daily.    Mercedes Casey MD       Review of Systems:     Constitution: Denies any fatigue, fever or chills.    HENT: Denies any headache, hearing impairment.    Eyes: Denies any blurring of vision, or photophobia.     Cardiovascular:  As per history of present illness.     Respiratory system: Denies any COPD, shortness of breath.     Endocrine:  No history of hyperlipidemia, diabetes.       Musculoskeletal:  No history of arthritis with musculoskeletal problems.    Gastrointestinal: No nausea, vomiting, or melena.    Genitourinary: No dysuria or hematuria.    Neurological: No history of seizure disorder, stroke, or memory problems.    Psychiatric/Behavioral: No history of depression,  bipolar disorder or schizophrenia .    Hematological: No history of easy bruising.    OBJECTIVE:    There were no vitals taken for this visit.    Physical Exam:     Constitutional: Patient is oriented to person, place, and time.     Skin: Warm and dry.    Well developed and nourished in no acute distress .     Head: Normocephalic and atraumatic.     Eyes: Pupils are equal, round, and reactive to light.     Neck: Neck supple. No bruit in the carotids, no elevation of JVD.    Cardiovascular: [place] in the fifth intercostal space, regular rate, and rhythm,  S1 greater than S2, no S3 or S4, no gallop.    Pulmonary/Chest: Air entry is equal on both sides.  No wheezing or crackles.     Abdominal: Soft.  No hepatosplenomegaly, bowel sounds are present.    Musculoskeletal: No kyphoscoliosis.    Neurological: Alert and oriented to person, place, and time.  Cranial nerve are intact. No motor or sensory deficit.    Extremities: No edema, no radial femoral delay.    Psychiatric: Normal mood and affect. Behavior is normal.    Lab Results (last 24 hours)     Procedure Component Value Units Date/Time    Basic Metabolic Panel [[id number]]  (Abnormal) Collected:  01/20/20 1130    Specimen:  Blood Updated:  01/20/20 1318     Glucose 299 mg/dL      BUN 16 mg/dL      Creatinine 1.14 mg/dL      Sodium 142 mmol/L      Potassium 3.5 mmol/L      Chloride 100 mmol/L      CO2 29.0 mmol/L      Calcium 9.5 mg/dL      eGFR Non African Amer 67 mL/min/1.73      BUN/Creatinine Ratio 14.0     Anion Gap 13.0 mmol/L     Narrative:       GFR Normal >60  Chronic Kidney Disease <60  Kidney Failure <15            A/P:  1.  Abnormal cardiac stress test-    Patient is being worked up for possible bariatric surgery.  I have discussed with him the cardiac catheterization including the radial approach.  The risk includes loss of pulse infection bleeding heart attack stroke and death need for bypass surgery need for arm surgery.  He is agreeable and would prefer  for us to do that.  ASA2 OPAL 2        Hawa Dominguez MD  1/20/2020  2:14 PM    Part of this note may be an electronic transcription/translation of spoken language to printed text using the Dragon Dictation System.

## 2020-01-22 ENCOUNTER — HOSPITAL ENCOUNTER (OUTPATIENT)
Facility: HOSPITAL | Age: 55
Setting detail: HOSPITAL OUTPATIENT SURGERY
Discharge: HOME | End: 2020-01-22
Attending: INTERNAL MEDICINE | Admitting: INTERNAL MEDICINE

## 2020-01-22 VITALS
HEIGHT: 73 IN | OXYGEN SATURATION: 98 % | BODY MASS INDEX: 41.75 KG/M2 | HEART RATE: 72 BPM | WEIGHT: 315 LBS | DIASTOLIC BLOOD PRESSURE: 82 MMHG | SYSTOLIC BLOOD PRESSURE: 152 MMHG | RESPIRATION RATE: 20 BRPM | TEMPERATURE: 98 F

## 2020-01-22 DIAGNOSIS — Z01.810 PREOP CARDIOVASCULAR EXAM: ICD-10-CM

## 2020-01-22 DIAGNOSIS — R94.39 ABNORMAL NUCLEAR STRESS TEST: ICD-10-CM

## 2020-01-22 LAB
ANION GAP SERPL CALCULATED.3IONS-SCNC: 13 MMOL/L (ref 5–15)
BUN BLD-MCNC: 18 MG/DL (ref 6–20)
BUN/CREAT SERPL: 17.6 (ref 7–25)
CALCIUM SPEC-SCNC: 9.7 MG/DL (ref 8.6–10.5)
CHLORIDE SERPL-SCNC: 99 MMOL/L (ref 98–107)
CO2 SERPL-SCNC: 28 MMOL/L (ref 22–29)
CREAT BLD-MCNC: 1.02 MG/DL (ref 0.76–1.27)
DEPRECATED RDW RBC AUTO: 41.2 FL (ref 37–54)
ERYTHROCYTE [DISTWIDTH] IN BLOOD BY AUTOMATED COUNT: 13.4 % (ref 12.3–15.4)
GFR SERPL CREATININE-BSD FRML MDRD: 76 ML/MIN/1.73
GLUCOSE BLD-MCNC: 180 MG/DL (ref 65–99)
HCT VFR BLD AUTO: 39.6 % (ref 37.5–51)
HGB BLD-MCNC: 13.7 G/DL (ref 13–17.7)
INR PPP: 0.92 (ref 0.8–1.2)
MCH RBC QN AUTO: 29.3 PG (ref 26.6–33)
MCHC RBC AUTO-ENTMCNC: 34.6 G/DL (ref 31.5–35.7)
MCV RBC AUTO: 84.8 FL (ref 79–97)
PLATELET # BLD AUTO: 205 10*3/MM3 (ref 140–450)
PMV BLD AUTO: 10.2 FL (ref 6–12)
POTASSIUM BLD-SCNC: 3.6 MMOL/L (ref 3.5–5.2)
PROTHROMBIN TIME: 12.2 SECONDS (ref 11.1–15.3)
RBC # BLD AUTO: 4.67 10*6/MM3 (ref 4.14–5.8)
SODIUM BLD-SCNC: 140 MMOL/L (ref 136–145)
WBC NRBC COR # BLD: 8.17 10*3/MM3 (ref 3.4–10.8)

## 2020-01-22 PROCEDURE — C1769 GUIDE WIRE: HCPCS | Performed by: INTERNAL MEDICINE

## 2020-01-22 PROCEDURE — 25010000002 MIDAZOLAM PER 1 MG: Performed by: INTERNAL MEDICINE

## 2020-01-22 PROCEDURE — 25010000002 HEPARIN (PORCINE) PER 1000 UNITS: Performed by: INTERNAL MEDICINE

## 2020-01-22 PROCEDURE — 25010000002 FENTANYL CITRATE (PF) 100 MCG/2ML SOLUTION: Performed by: INTERNAL MEDICINE

## 2020-01-22 PROCEDURE — 93458 L HRT ARTERY/VENTRICLE ANGIO: CPT | Performed by: INTERNAL MEDICINE

## 2020-01-22 PROCEDURE — 85027 COMPLETE CBC AUTOMATED: CPT | Performed by: INTERNAL MEDICINE

## 2020-01-22 PROCEDURE — 0 IOPAMIDOL PER 1 ML: Performed by: INTERNAL MEDICINE

## 2020-01-22 PROCEDURE — C1894 INTRO/SHEATH, NON-LASER: HCPCS | Performed by: INTERNAL MEDICINE

## 2020-01-22 PROCEDURE — 85610 PROTHROMBIN TIME: CPT | Performed by: INTERNAL MEDICINE

## 2020-01-22 PROCEDURE — 80048 BASIC METABOLIC PNL TOTAL CA: CPT | Performed by: INTERNAL MEDICINE

## 2020-01-22 RX ORDER — MIDAZOLAM HYDROCHLORIDE 1 MG/ML
INJECTION INTRAMUSCULAR; INTRAVENOUS AS NEEDED
Status: DISCONTINUED | OUTPATIENT
Start: 2020-01-22 | End: 2020-01-22 | Stop reason: HOSPADM

## 2020-01-22 RX ORDER — HYDROCODONE BITARTRATE AND ACETAMINOPHEN 5; 325 MG/1; MG/1
1 TABLET ORAL EVERY 4 HOURS PRN
Status: CANCELLED | OUTPATIENT
Start: 2020-01-22 | End: 2020-02-01

## 2020-01-22 RX ORDER — ASPIRIN 81 MG/1
81 TABLET, CHEWABLE ORAL DAILY
Status: CANCELLED | OUTPATIENT
Start: 2020-01-22

## 2020-01-22 RX ORDER — NALOXONE HCL 0.4 MG/ML
0.4 VIAL (ML) INJECTION
Status: CANCELLED | OUTPATIENT
Start: 2020-01-22

## 2020-01-22 RX ORDER — SODIUM CHLORIDE 9 MG/ML
100 INJECTION, SOLUTION INTRAVENOUS CONTINUOUS
Status: DISCONTINUED | OUTPATIENT
Start: 2020-01-22 | End: 2020-01-22 | Stop reason: HOSPADM

## 2020-01-22 RX ORDER — SODIUM CHLORIDE 9 MG/ML
100 INJECTION, SOLUTION INTRAVENOUS CONTINUOUS
Status: DISCONTINUED | OUTPATIENT
Start: 2020-01-22 | End: 2020-01-22

## 2020-01-22 RX ORDER — FENTANYL CITRATE 50 UG/ML
INJECTION, SOLUTION INTRAMUSCULAR; INTRAVENOUS AS NEEDED
Status: DISCONTINUED | OUTPATIENT
Start: 2020-01-22 | End: 2020-01-22 | Stop reason: HOSPADM

## 2020-01-22 RX ORDER — FENTANYL CITRATE 50 UG/ML
25 INJECTION, SOLUTION INTRAMUSCULAR; INTRAVENOUS
Status: CANCELLED | OUTPATIENT
Start: 2020-01-22

## 2020-01-22 RX ORDER — ACETAMINOPHEN 325 MG/1
650 TABLET ORAL EVERY 4 HOURS PRN
Status: CANCELLED | OUTPATIENT
Start: 2020-01-22

## 2020-01-22 RX ORDER — LIDOCAINE HYDROCHLORIDE 20 MG/ML
INJECTION, SOLUTION INFILTRATION; PERINEURAL AS NEEDED
Status: DISCONTINUED | OUTPATIENT
Start: 2020-01-22 | End: 2020-01-22 | Stop reason: HOSPADM

## 2020-01-22 RX ADMIN — SODIUM CHLORIDE 100 ML/HR: 9 INJECTION, SOLUTION INTRAVENOUS at 08:30

## 2020-02-27 DIAGNOSIS — M25.561 PAIN IN BOTH KNEES, UNSPECIFIED CHRONICITY: Primary | ICD-10-CM

## 2020-02-27 DIAGNOSIS — M25.562 PAIN IN BOTH KNEES, UNSPECIFIED CHRONICITY: Primary | ICD-10-CM

## 2020-02-28 ENCOUNTER — OFFICE VISIT (OUTPATIENT)
Dept: ORTHOPEDIC SURGERY | Facility: CLINIC | Age: 55
End: 2020-02-28

## 2020-02-28 VITALS — WEIGHT: 315 LBS | HEIGHT: 73 IN | BODY MASS INDEX: 41.75 KG/M2

## 2020-02-28 DIAGNOSIS — M17.11 PRIMARY OSTEOARTHRITIS OF RIGHT KNEE: ICD-10-CM

## 2020-02-28 DIAGNOSIS — M25.562 PAIN IN BOTH KNEES, UNSPECIFIED CHRONICITY: Primary | ICD-10-CM

## 2020-02-28 DIAGNOSIS — M25.561 PAIN IN BOTH KNEES, UNSPECIFIED CHRONICITY: Primary | ICD-10-CM

## 2020-02-28 DIAGNOSIS — M25.461 EFFUSION OF RIGHT KNEE: ICD-10-CM

## 2020-02-28 PROCEDURE — 99213 OFFICE O/P EST LOW 20 MIN: CPT | Performed by: ORTHOPAEDIC SURGERY

## 2020-02-28 NOTE — PROGRESS NOTES
Kurtis Valladares is a 54 y.o. male returns for     Chief Complaint   Patient presents with   • Left Knee - Follow-up   • Right Knee - Follow-up       HISTORY OF PRESENT ILLNESS: Patient being seen for bilateral knee follow up. X-rays done today. Requesting bilateral knee injections. Synvisc One given in right knee 01/09/2019. Reports no previous left knee injections. Reports he will be having bariatric surgery the end of March and will no longer be able to take his Diclofenac.  He wants to try some steroid shots.  We have contacted the bariatric surgery office and they state there is no problem from their standpoint with this.       CONCURRENT MEDICAL HISTORY:    Past Medical History:   Diagnosis Date   • Acute bronchitis    • Asthma    • Diabetes mellitus (CMS/HCC)    • Dyspnea    • GERD (gastroesophageal reflux disease)    • Hypertension    • Pain in lower limb    • Sleep apnea    • Sprain of ankle        No Known Allergies      Current Outpatient Medications:   •  albuterol sulfate  (90 Base) MCG/ACT inhaler, Inhale 2 puffs As Needed., Disp: , Rfl: 2  •  amLODIPine (NORVASC) 10 MG tablet, Take 1 tablet by mouth Daily., Disp: 30 tablet, Rfl: 0  •  aspirin 81 MG tablet, Take 81 mg by mouth Daily., Disp: , Rfl:   •  diclofenac (VOLTAREN) 50 MG EC tablet, Take 50 mg by mouth 3 (Three) Times a Day., Disp: , Rfl: 1  •  gabapentin (NEURONTIN) 100 MG capsule, Take 600 mg by mouth 3 (Three) Times a Day., Disp: , Rfl: 3  •  hydrochlorothiazide (HYDRODIURIL) 25 MG tablet, Take 1 tablet by mouth Daily., Disp: 30 tablet, Rfl: 0  •  lisinopril (PRINIVIL,ZESTRIL) 40 MG tablet, Take 1 tablet by mouth 2 (Two) Times a Day., Disp: 60 tablet, Rfl: 0  •  Loratadine 10 MG capsule, Take 10 mg by mouth Daily., Disp: , Rfl:   •  magnesium oxide (MAGOX) 400 (241.3 Mg) MG tablet tablet, Take 400 mg by mouth Daily., Disp: , Rfl:   •  metFORMIN ER (GLUCOPHAGE-XR) 500 MG 24 hr tablet, Take 500 mg by mouth Daily., Disp: , Rfl: 0  •   "minocycline (MINOCIN,DYNACIN) 100 MG capsule, Take 100 mg by mouth 2 (Two) Times a Day., Disp: , Rfl:   •  omeprazole (priLOSEC) 20 MG capsule, Take 20 mg by mouth Daily., Disp: , Rfl:     Current Facility-Administered Medications:   •  bupivacaine (PF) (MARCAINE) 0.5 % injection 1 mL, 1 mL, Injection, Once, Jono Bucio DPM    Past Surgical History:   Procedure Laterality Date   • CARDIAC CATHETERIZATION N/A 1/22/2020    Procedure: Left Heart Cath/PCI if indicated;  Surgeon: Hawa Dominguez MD;  Location: [facility] CATH INVASIVE LOCATION;  Service: Cardiology           ROS: Review of systems has been updated as of today's date.  All other systems are negative except as noted previously.    PHYSICAL EXAMINATION:       Ht 184.2 cm (72.5\")   Wt (!) 167 kg (369 lb)   BMI 49.36 kg/m²     Physical Exam   Constitutional: He is oriented to person, place, and time. No distress.   HENT:   Head: Normocephalic and atraumatic.   Eyes: Pupils are equal, round, and reactive to light. EOM are normal.   Neck: Neck supple. No tracheal deviation present.   Pulmonary/Chest: Effort normal.   Musculoskeletal: He exhibits edema and tenderness. He exhibits no deformity.   Neurological: He is alert and oriented to person, place, and time.   Skin: Skin is warm and dry. He is not diaphoretic. No erythema.   Psychiatric: He has a normal mood and affect.       GAIT:     [x]  Normal  []  Antalgic    Assistive device: [x]  None  []  Walker     []  Crutches  []  Cane     []  Wheelchair  []  Stretcher    Ortho Exam  Edema is noted.  Motion fairly well-preserved.  Neurologically intact.  Skin is well-preserved calf is negative    No results found.          ASSESSMENT:    Diagnoses and all orders for this visit:    Pain in both knees, unspecified chronicity    Primary osteoarthritis of right knee    Effusion of right knee          PLAN at this point the steroid did not work he wants to try the Synvisc 1, this worked for about a year when " he had it previously.  We want to do both knees at this point.  He is having his surgery and will be unable to do his anti-inflammatory prior to this.  We have talked to the pre-CERT people and they said feel that they can get this in the next week I think is medically necessary we will see him back once we obtain the [place] supplementation and do both knees at that point    Patient's Body mass index is 49.36 kg/m². BMI is above normal parameters. Recommendations include: exercise counseling and nutrition counseling.      No follow-ups on file.      This document has been electronically signed by Lilian Mcleod MA on February 28, 2020 10:57 AM

## 2020-04-27 ENCOUNTER — CLINICAL SUPPORT (OUTPATIENT)
Dept: ORTHOPEDIC SURGERY | Facility: CLINIC | Age: 55
End: 2020-04-27

## 2020-04-27 VITALS — HEIGHT: 73 IN | BODY MASS INDEX: 41.75 KG/M2 | WEIGHT: 315 LBS

## 2020-04-27 DIAGNOSIS — M17.12 PRIMARY OSTEOARTHRITIS OF LEFT KNEE: ICD-10-CM

## 2020-04-27 DIAGNOSIS — M17.11 PRIMARY OSTEOARTHRITIS OF RIGHT KNEE: ICD-10-CM

## 2020-04-27 DIAGNOSIS — M25.562 PAIN IN BOTH KNEES, UNSPECIFIED CHRONICITY: Primary | ICD-10-CM

## 2020-04-27 DIAGNOSIS — M25.561 PAIN IN BOTH KNEES, UNSPECIFIED CHRONICITY: Primary | ICD-10-CM

## 2020-04-27 PROCEDURE — 20610 DRAIN/INJ JOINT/BURSA W/O US: CPT | Performed by: ORTHOPAEDIC SURGERY

## 2020-04-27 RX ORDER — POTASSIUM CHLORIDE 20MEQ/15ML
LIQUID (ML) ORAL DAILY
COMMUNITY

## 2020-04-27 NOTE — PROGRESS NOTES
"Kurtis Valladares is a 54 y.o. male returns for     Chief Complaint   Patient presents with   • Left Knee - Follow-up   • Right Knee - Follow-up   • Injections     Synvisc One       HISTORY OF PRESENT ILLNESS:  Patient here for Synvisc One injection into both knees.     CONCURRENT MEDICAL HISTORY:    The following portions of the patient's history were reviewed and updated as appropriate: allergies, current medications, past family history, past medical history, past social history, past surgical history and problem list.     ROS  No fevers or chills.  No chest pain or shortness of air.  No GI or  disturbances.  No cardiac issues noted.  All other systems are reported negative or without change.    PHYSICAL EXAMINATION:       Ht 184.2 cm (72.5\")   Wt (!) 145 kg (320 lb)   BMI 42.80 kg/m²     Physical Exam    GAIT:     [x]  Normal  []  Antalgic    Assistive device: [x]  None  []  Walker     []  Crutches  []  Cane     []  Wheelchair  []  Stretcher    Ortho Exam      No results found.          ASSESSMENT:    Diagnoses and all orders for this visit:    Pain in both knees, unspecified chronicity  -     Large Joint Arthrocentesis: R knee  -     Large Joint Arthrocentesis: L knee    Primary osteoarthritis of right knee  -     Large Joint Arthrocentesis: R knee  -     Large Joint Arthrocentesis: L knee    Primary osteoarthritis of left knee  -     Large Joint Arthrocentesis: R knee  -     Large Joint Arthrocentesis: L knee    Other orders  -     potassium chloride (KAYCIEL) 20 MEQ/15ML (10%) solution; Take  by mouth Daily.          PLAN Synvisc injection bilateral knee under sterile technique.  He will ice these down tonight.  He will call back as needed.    Large Joint Arthrocentesis: R knee  Date/Time: 4/27/2020 1:06 PM  Consent given by: patient  Site marked: site marked  Timeout: Immediately prior to procedure a time out was called to verify the correct patient, procedure, equipment, support staff and site/side " marked as required   Supporting Documentation  Indications: pain   Procedure Details  Location: knee - R knee  Preparation: Patient was prepped and draped in the usual sterile fashion  Needle size: 22 G  Approach: anteromedial  Medications administered: 48 mg hylan 48 MG/6ML  Patient tolerance: patient tolerated the procedure well with no immediate complications    Large Joint Arthrocentesis: L knee  Date/Time: 4/27/2020 1:07 PM  Consent given by: patient  Site marked: site marked  Timeout: Immediately prior to procedure a time out was called to verify the correct patient, procedure, equipment, support staff and site/side marked as required   Supporting Documentation  Indications: pain   Procedure Details  Location: knee - L knee  Preparation: Patient was prepped and draped in the usual sterile fashion  Needle size: 20 G  Approach: anteromedial  Medications administered: 48 mg hylan 48 MG/6ML  Patient tolerance: patient tolerated the procedure well with no immediate complications        No follow-ups on file.        This document has been electronically signed by Collin Benavidez MD on April 27, 2020 13:16

## 2022-04-19 ENCOUNTER — HOSPITAL ENCOUNTER (OUTPATIENT)
Dept: MRI IMAGING | Facility: HOSPITAL | Age: 57
Discharge: HOME | End: 2022-04-19
Admitting: NURSE PRACTITIONER

## 2022-04-19 DIAGNOSIS — M54.50 LOW BACK PAIN, UNSPECIFIED BACK PAIN LATERALITY, UNSPECIFIED CHRONICITY, UNSPECIFIED WHETHER SCIATICA PRESENT: ICD-10-CM

## 2022-04-19 PROCEDURE — 72148 MRI LUMBAR SPINE W/O DYE: CPT

## 2022-06-14 ENCOUNTER — HOSPITAL ENCOUNTER (OUTPATIENT)
Dept: PHYSICAL THERAPY | Facility: HOSPITAL | Age: 57
Setting detail: THERAPIES SERIES
Discharge: HOME | End: 2022-06-14

## 2022-06-14 ENCOUNTER — TRANSCRIBE ORDERS (OUTPATIENT)
Dept: PHYSICAL THERAPY | Facility: HOSPITAL | Age: 57
End: 2022-06-14

## 2022-06-14 DIAGNOSIS — M54.50 LOW BACK PAIN, UNSPECIFIED BACK PAIN LATERALITY, UNSPECIFIED CHRONICITY, UNSPECIFIED WHETHER SCIATICA PRESENT: Primary | ICD-10-CM

## 2022-06-14 DIAGNOSIS — M54.2 CERVICALGIA: ICD-10-CM

## 2022-06-14 PROCEDURE — 97161 PT EVAL LOW COMPLEX 20 MIN: CPT | Performed by: PHYSICAL THERAPIST

## 2022-06-14 PROCEDURE — 97110 THERAPEUTIC EXERCISES: CPT | Performed by: PHYSICAL THERAPIST

## 2022-06-14 NOTE — THERAPY EVALUATION
Outpatient Physical Therapy Ortho Initial Evaluation  [facility]     Patient Name: Kurtis Valladares  : 1965  MRN: [id number]  Today's Date: 2022      Visit Date: 2022   Visit number:     % Improvement:   kate SÁNCHEZ appointment:   22  Recert date:  22    Visit Diagnosis:  Cervical and low back pain          Patient Active Problem List   Diagnosis   • Right knee pain   • Primary osteoarthritis of right knee   • Effusion of right knee   • Pain of right heel   • Postcalcaneal bursitis of right foot   • Pre-op evaluation   • Abnormal nuclear stress test   • Preop cardiovascular exam        Past Medical History:   Diagnosis Date   • Acute bronchitis    • Asthma    • Diabetes mellitus (HCC)    • Dyspnea    • GERD (gastroesophageal reflux disease)    • Hypertension    • Pain in lower limb    • Sleep apnea    • Sprain of ankle         Past Surgical History:   Procedure Laterality Date   • CARDIAC CATHETERIZATION N/A 2020    Procedure: Left Heart Cath/PCI if indicated;  Surgeon: Hawa Dominguez MD;  Location: [facility] CATH INVASIVE LOCATION;  Service: Cardiology       Visit Dx:     ICD-10-CM ICD-9-CM   1. Low back pain, unspecified back pain laterality, unspecified chronicity, unspecified whether sciatica present  M54.50 724.2   2. Cervicalgia  M54.2 723.1          Patient History     Row Name 22 1300             History    Chief Complaint Pain  -SW      Brief Description of Current Complaint Patient reports insidious onset back and neck pain. Pain seemed to start after he lost weight following gastric sleeve surgery in 2020. Patient denies UE and LE pain and N/T. He has a burning pain in center of low back pain. Prolonged sitting exacerbates symptoms and occasionally causes left leg pain. Inversion table helps to reduce symptoms. Patient is two days away  from completing a MEdrol dosepack which helped considerably. Pain is worse on left. When this is bad, walking  hurts. Otherwise walking is ok. He walks 2 or more miles at a time. Patient also has neck  pain, but this is less severe than back pain.  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  -SW              Pain     Pain Location Back  -SW      Pain at Present 1  -SW      Pain at Best 1  -SW      Pain at Worst 9  -SW      Is your sleep disturbed? Yes  -SW      Is medication used to assist with sleep? No  -SW              Fall Risk Assessment    Any falls in the past year: No  -SW              Daily Activities    Primary Language English  -            User Key  (r) = Recorded By, (t) = Taken By, (c) = Cosigned By    Initials Name Provider Type    SW Mai Caicedo Physical Therapist                 PT Ortho     Row Name 06/14/22 1400       Subjective Pain    Able to rate subjective pain? yes  -SW    Pre-Treatment Pain Level 1  -SW       Posture/Observations    Posture/Observations Comments psis/ic level  -SW       Special Tests/Palpation    Special Tests/Palpation Lumbar/SI  -slr b  -SW       General ROM    Head/Neck/Trunk Trunk Extension;Trunk Flexion;Trunk Lt Lateral Flexion;Trunk Rt Lateral Flexion  -SW    GENERAL ROM COMMENTS b hip ir 5 degrees  -SW       Head/Neck/Trunk    Trunk Extension AROM 5 degrees  -SW    Trunk Flexion AROM 60 degrees  -SW    Trunk Lt Lateral Flexion AROM 5 degrees  -SW    Trunk Rt Lateral Flexion AROM 10 degrees  -SW       MMT (Manual Muscle Testing)    General MMT Comments bilateral LE strength 5/5  -SW       Sensation    Light Touch No apparent deficits  -SW          User Key  (r) = Recorded By, (t) = Taken By, (c) = Cosigned  "By    Initials Name Provider Type    Mai Castro Physical Therapist                            Therapy Education  Education Details: ltr,pelvic rock,bridging, double heel tap prone IR, sciatic n glide, dktc, sitting flexion stretch  Given: HEP, Symptoms/condition management  Program: New  How Provided: Verbal, Demonstration, Written  Provided to: Patient  Level of Understanding: Teach back education performed, Verbalized, Demonstrated      PT OP Goals     Row Name 06/14/22 1400          PT Short Term Goals    STG Date to Achieve 07/05/22  -     STG 1 independent and compliant with hep  -            Long Term Goals    LTG Date to Achieve 07/26/22  -     LTG 1 trunk AROM 75% all directions.  -SW     LTG 2 worst pain consistently reports at 4/10 or less  -SW     LTG 3 able to perform  modified side plank 30\" ea side  -SW     LTG 4 able to perform 20 conseuctive dead bugs  -     LTG 5 able to perform 20 consecutive bird dog with good mechanics  -            Time Calculation    PT Goal Re-Cert Due Date 07/05/22  -           User Key  (r) = Recorded By, (t) = Taken By, (c) = Cosigned By    Initials Name Provider Type    Mai Castro Physical Therapist                 PT Assessment/Plan     Row Name 06/14/22 1400          PT Assessment    Functional Limitations Decreased safety during functional activities;Limitation in home management;Limitations in community activities;Limitations in functional capacity and performance;Performance in leisure activities;Performance in self-care ADL;Performance in work activities  -     Impairments Balance;Impaired muscle endurance;Impaired muscle length;Muscle strength;Pain;Poor body mechanics;Range of motion  -     Assessment Comments 56 yom presents with chronic back pain and occasional neck pain with reduced trunk ROM and core strength and stabilization.  -SW     Rehab Potential Good  -SW     Patient/caregiver participated in establishment of treatment plan and " "goals Yes  -SW     Patient would benefit from skilled therapy intervention Yes  -SW            PT Plan    PT Frequency 2x/week  -     Predicted Duration of Therapy Intervention (PT) 6 weeks  -SW     Planned CPT's? PT EVAL LOW COMPLEXITY: 97407;PT THER PROC EA 15 MIN: 22808;PT THER ACT EA 15 MIN: 89698;PT MANUAL THERAPY EA 15 MIN: 73413;PT NEUROMUSC RE-EDUCATION EA 15 MIN: 09995;PT AQUATIC THERAPY EA 15 MIN: 69866;PT SELF CARE/HOME MGMT/TRAIN EA 15: 24802;PT HOT OR COLD PACK TREAT [facility]  -SW     Physical Therapy Interventions (Optional Details) aquatics exercise;balance training;manual therapy techniques;modalities;neuromuscular re-education;ROM (Range of Motion);strengthening;stretching;patient/family education  -     PT Plan Comments Focus on improving trunk ROM and core strength.  -SW           User Key  (r) = Recorded By, (t) = Taken By, (c) = Cosigned By    Initials Name Provider Type    Mai Castro Physical Therapist                   OP Exercises     Row Name 06/14/22 1400             Subjective Pain    Able to rate subjective pain? yes  -SW      Pre-Treatment Pain Level 1  -SW              Exercise 1    Exercise Name 1 ltr  -SW      Reps 1 10  -SW              Exercise 2    Exercise Name 2 pelvic rock  -SW      Reps 2 10  -SW              Exercise 3    Exercise Name 3 sciatic n glide  -SW      Reps 3 30  -SW              Exercise 4    Exercise Name 4 bridge  -SW      Reps 4 10  -SW              Exercise 5    Exercise Name 5 double heel taps  -SW      Reps 5 10  -SW              Exercise 6    Exercise Name 6 prone ir stretch  -SW      Reps 6 10  -SW              Exercise 7    Exercise Name 7 dktc  -SW      Reps 7 30\"x2  -SW            User Key  (r) = Recorded By, (t) = Taken By, (c) = Cosigned By    Initials Name Provider Type    Mai Castro Physical Therapist                                        Time Calculation:     Start Time: 1347  Stop Time: 1425  Time Calculation (min): 38 min     Therapy " Charges for Today     Code Description Service Date Service Provider Modifiers Qty    48686420562 HC PT THER PROC EA 15 MIN 6/14/2022 Mai Caicedo GP 1    01704953517 HC PT EVAL LOW COMPLEXITY 2 6/14/2022 Mai Caicedo GP 1                    Mai Caicedo  6/14/2022

## 2022-06-29 ENCOUNTER — APPOINTMENT (OUTPATIENT)
Dept: PHYSICAL THERAPY | Facility: HOSPITAL | Age: 57
End: 2022-06-29

## 2022-07-01 ENCOUNTER — APPOINTMENT (OUTPATIENT)
Dept: PHYSICAL THERAPY | Facility: HOSPITAL | Age: 57
End: 2022-07-01

## 2023-10-30 ENCOUNTER — PRE-ADMISSION TESTING (OUTPATIENT)
Dept: PREADMISSION TESTING | Facility: HOSPITAL | Age: 58
End: 2023-10-30
Payer: MEDICAID

## 2023-10-30 ENCOUNTER — HOSPITAL ENCOUNTER (OUTPATIENT)
Dept: GENERAL RADIOLOGY | Facility: HOSPITAL | Age: 58
Discharge: HOME | End: 2023-10-30
Payer: MEDICAID

## 2023-10-30 VITALS
WEIGHT: 257.72 LBS | OXYGEN SATURATION: 99 % | HEART RATE: 57 BPM | HEIGHT: 72 IN | SYSTOLIC BLOOD PRESSURE: 162 MMHG | DIASTOLIC BLOOD PRESSURE: 84 MMHG | RESPIRATION RATE: 16 BRPM | BODY MASS INDEX: 34.91 KG/M2

## 2023-10-30 LAB
ALBUMIN SERPL-MCNC: 3.5 G/DL (ref 3.5–5.2)
ALBUMIN/GLOB SERPL: 1.1 G/DL
ALP SERPL-CCNC: 83 U/L (ref 39–117)
ALT SERPL W P-5'-P-CCNC: 27 U/L (ref 1–41)
ANION GAP SERPL CALCULATED.3IONS-SCNC: 6 MMOL/L (ref 5–15)
APTT PPP: 32.5 SECONDS (ref 24.5–36)
AST SERPL-CCNC: 43 U/L (ref 1–40)
BILIRUB SERPL-MCNC: 0.4 MG/DL (ref 0–1.2)
BILIRUB UR QL STRIP: NEGATIVE
BUN SERPL-MCNC: 9 MG/DL (ref 6–20)
BUN/CREAT SERPL: 10.7 (ref 7–25)
CALCIUM SPEC-SCNC: 8.7 MG/DL (ref 8.6–10.5)
CHLORIDE SERPL-SCNC: 101 MMOL/L (ref 98–107)
CLARITY UR: CLEAR
CO2 SERPL-SCNC: 35 MMOL/L (ref 22–29)
COLOR UR: YELLOW
CREAT SERPL-MCNC: 0.84 MG/DL (ref 0.76–1.27)
DEPRECATED RDW RBC AUTO: 46.8 FL (ref 37–54)
EGFRCR SERPLBLD CKD-EPI 2021: 102.3 ML/MIN/1.73
ERYTHROCYTE [DISTWIDTH] IN BLOOD BY AUTOMATED COUNT: 14.4 % (ref 12.3–15.4)
GLOBULIN UR ELPH-MCNC: 3.1 GM/DL
GLUCOSE SERPL-MCNC: 93 MG/DL (ref 65–99)
GLUCOSE UR STRIP-MCNC: NEGATIVE MG/DL
HCT VFR BLD AUTO: 34.7 % (ref 37.5–51)
HGB BLD-MCNC: 10.9 G/DL (ref 13–17.7)
HGB UR QL STRIP.AUTO: NEGATIVE
INR PPP: 1.03 (ref 0.91–1.09)
KETONES UR QL STRIP: NEGATIVE
LEUKOCYTE ESTERASE UR QL STRIP.AUTO: NEGATIVE
MCH RBC QN AUTO: 27.7 PG (ref 26.6–33)
MCHC RBC AUTO-ENTMCNC: 31.4 G/DL (ref 31.5–35.7)
MCV RBC AUTO: 88.1 FL (ref 79–97)
NITRITE UR QL STRIP: NEGATIVE
PH UR STRIP.AUTO: 7.5 [PH] (ref 5–8)
PLATELET # BLD AUTO: 194 10*3/MM3 (ref 140–450)
PMV BLD AUTO: 9.3 FL (ref 6–12)
POTASSIUM SERPL-SCNC: 2.8 MMOL/L (ref 3.5–5.2)
PROT SERPL-MCNC: 6.6 G/DL (ref 6–8.5)
PROT UR QL STRIP: NEGATIVE
PROTHROMBIN TIME: 13.6 SECONDS (ref 11.8–14.8)
RBC # BLD AUTO: 3.94 10*6/MM3 (ref 4.14–5.8)
SODIUM SERPL-SCNC: 142 MMOL/L (ref 136–145)
SP GR UR STRIP: <=1.005 (ref 1–1.03)
UROBILINOGEN UR QL STRIP: NORMAL
WBC NRBC COR # BLD: 6.66 10*3/MM3 (ref 3.4–10.8)

## 2023-10-30 PROCEDURE — 85027 COMPLETE CBC AUTOMATED: CPT

## 2023-10-30 PROCEDURE — 93010 ELECTROCARDIOGRAM REPORT: CPT | Performed by: INTERNAL MEDICINE

## 2023-10-30 PROCEDURE — 85610 PROTHROMBIN TIME: CPT

## 2023-10-30 PROCEDURE — 93005 ELECTROCARDIOGRAM TRACING: CPT

## 2023-10-30 PROCEDURE — 36415 COLL VENOUS BLD VENIPUNCTURE: CPT

## 2023-10-30 PROCEDURE — 80053 COMPREHEN METABOLIC PANEL: CPT

## 2023-10-30 PROCEDURE — 71045 X-RAY EXAM CHEST 1 VIEW: CPT

## 2023-10-30 PROCEDURE — 85730 THROMBOPLASTIN TIME PARTIAL: CPT

## 2023-10-30 PROCEDURE — 81003 URINALYSIS AUTO W/O SCOPE: CPT

## 2023-10-30 RX ORDER — OMEPRAZOLE 20 MG/1
20 CAPSULE, DELAYED RELEASE ORAL DAILY
COMMUNITY

## 2023-10-30 RX ORDER — OXYCODONE AND ACETAMINOPHEN 10; 325 MG/1; MG/1
1 TABLET ORAL EVERY 8 HOURS PRN
COMMUNITY

## 2023-10-30 RX ORDER — MAGNESIUM OXIDE 400 MG/1
400 TABLET ORAL DAILY
COMMUNITY

## 2023-10-30 RX ORDER — TAMSULOSIN HYDROCHLORIDE 0.4 MG/1
1 CAPSULE ORAL 2 TIMES DAILY
COMMUNITY

## 2023-10-30 RX ORDER — MULTIPLE VITAMINS W/ MINERALS TAB 9MG-400MCG
1 TAB ORAL DAILY
COMMUNITY

## 2023-10-30 RX ORDER — MINOCYCLINE HYDROCHLORIDE 100 MG/1
100 CAPSULE ORAL 2 TIMES DAILY
COMMUNITY

## 2023-10-30 NOTE — DISCHARGE INSTRUCTIONS
Before you come to the hospital        Arrival time: AS DIRECTED BY OFFICE     YOU MAY TAKE THE FOLLOWING MEDICATION(S) THE MORNING OF SURGERY WITH A SIP OF WATER: GABAPENTIN, PRILOSEC           ALL OTHER HOME MEDICATION CHECK WITH YOUR PHYSICIAN (especially if   you are taking diabetes medicines or blood thinners)    Do not take any Erectile Dysfunction medications (EX: CIALIS, VIAGRA) 24 hours prior to surgery.      If you were given and instructed to use a germ- killing soap, use as directed the night before surgery and again the morning of surgery or as directed by your surgeon. (Use one-half of the bottle with each shower.)   See attached information for How to Use Chlorhexidine for Bathing if applicable.            Eating and drinking restrictions prior to scheduled arrival time    2 Hours before arrival time STOP   Drinking Clear liquids (water, black coffee-NO CREAM,  apple juice-no pulp)    Clear Liquids    Water and flavored water                                                                      Clear Fruit juices, such as cranberry juice and apple juice.  Black coffee (NO cream of any kind, including powdered).  Plain tea  Clear bouillon or broth.  Flavored gelatin.  Soda.  Gatorade or Powerade.    8 Hours before arrival time STOP   All food, full liquids, and dairy products  Full liquid examples  Juices that have pulp.  Frozen ice pops that contain fruit pieces.  Coffee with creamer  Milk.  Yogurt.    (It is extremely important that you follow these guidelines to prevent delay or cancelation of your procedure)                       MANAGING PAIN AFTER SURGERY    We know you are probably wondering what your pain will be like after surgery.  Following surgery it is unrealistic to expect you will not have pain.   Pain is how our bodies let us know that something is wrong or cautions us to be careful.  That said, our goal is to make your pain tolerable.    Methods we may use to treat your pain include  (oral or IV medications, PCAs, epidurals, nerve blocks, etc.)   While some procedures require IV pain medications for a short time after surgery, transitioning to pain medications by mouth allows for better management of pain.   Your nurse will encourage you to take oral pain medications whenever possible.  IV medications work almost immediately, but only last a short while.  Taking medications by mouth allows for a more constant level of medication in your blood stream for a longer period of time.      Once your pain is out of control it is harder to get back under control.  It is important you are aware when your next dose of pain medication is due.  If you are admitted, your nurse may write the time of your next dose on the white board in your room to help you remember.      We are interested in your pain and encourage you to inform us about aggravating factors during your visit.   Many times a simple repositioning every few hours can make a big difference.    If your physician says it is okay, do not let your pain prevent you from getting out of bed. Be sure to call your nurse for assistance prior to getting up so you do not fall.      Before surgery, please decide your tolerable pain goal.  These faces help describe the pain ratings we use on a 0-10 scale.   Be prepared to tell us your goal and whether or not you take pain or anxiety medications at home.          Preparing for Surgery  Preparing for surgery is an important part of your care. It can make things go more smoothly and help you avoid complications. The steps leading up to surgery may vary among hospitals. Follow all instructions given to you by your health care providers. Ask questions if you do not understand something. Talk about any concerns that you have.  Here are some questions to consider asking before your surgery:  If my surgery is not an emergency (is elective), when would be the best time to have the surgery?  What arrangements do I need  to make for work, home, or school?  What will my recovery be like? How long will it be before I can return to normal activities?  Will I need to prepare my home? Will I need to arrange care for me or my children?  Should I expect to have pain after surgery? What are my pain management options? Are there nonmedical options that I can try for pain?  Tell a health care provider about:  Any allergies you have.  All medicines you are taking, including vitamins, herbs, eye drops, creams, and over-the-counter medicines.  Any problems you or family members have had with anesthetic medicines.  Any blood disorders you have.  Any surgeries you have had.  Any medical conditions you have.  Whether you are pregnant or may be pregnant.  What are the risks?  The risks and complications of surgery depend on the specific procedure that you have. Discuss all the risks with your health care providers before your surgery. Ask about common surgical complications, which may include:  Infection.  Bleeding or a need for blood replacement (transfusion).  Allergic reactions to medicines.  Damage to surrounding nerves, tissues, or structures.  A blood clot.  Scarring.  Failure of the surgery to correct the problem.  Follow these instructions before the procedure:  Several days or weeks before your procedure  You may have a physical exam by your primary health care provider to make sure it is safe for you to have surgery.  You may have testing. This may include a chest X-ray, blood and urine tests, electrocardiogram (ECG), or other testing.  Ask your health care provider about:  Changing or stopping your regular medicines. This is especially important if you are taking diabetes medicines or blood thinners.  Taking medicines such as aspirin and ibuprofen. These medicines can thin your blood. Do not take these medicines unless your health care provider tells you to take them.  Taking over-the-counter medicines, vitamins, herbs, and  supplements.  Do not use any products that contain nicotine or tobacco, such as cigarettes and e-cigarettes. If you need help quitting, ask your health care provider.  Avoid alcohol.  Ask your health care provider if there are exercises you can do to prepare for surgery.  Eat a healthy diet.   Plan to have someone 18 years of age or older to take you home from the hospital. We will need to verify your ride on the morning of surgery if you are being discharged home on the same day. Tell your ride to be expecting a call from the hospital prior to your procedure.   Plan to have a responsible adult care for you for at least 24 hours after you leave the hospital or clinic. This is important.  The day before your procedure  You may be given antibiotic medicine to take by mouth to help prevent infection. Take it as told by your health care provider.  You may be asked to shower with a germ-killing soap.  Follow instructions from your health care provider about eating and drinking restrictions. This includes gum, mints and hard candy.  Pack comfortable clothes according to your procedure.   The day of your procedure  You may need to take another shower with a germ-killing soap before you leave home in the morning.  With a small sip of water, take only the medicines that you are told to take.  Remove all jewelry including rings.   Leave anything you consider valuable at home except hearing aids if needed.  You do not need to bring your home medications into the hospital.   Do not wear any makeup, nail polish, powder, deodorant, lotion, hair accessories, or anything on your skin or body except your clothes.  If you will be staying in the hospital, bring a case to hold your glasses, contacts, or dentures. You may also want to bring your robe and non-skid footwear.       (Do not use denture adhesives since you will be asked to remove them during  surgery).   If you wear oxygen at home, bring it with you the day of surgery.  If  instructed by your health care provider, bring your sleep apnea device with you on the day of your surgery (if this applies to you).  You may want to leave your suitcase and sleep apnea device in the car until after surgery.   Arrive at the hospital as scheduled.  Bring a friend or family member with you who can help to answer questions and be present while you meet with your health care provider.  At the hospital  When you arrive at the hospital:  Go to registration located at the main entrance of the hospital. You will be registered and given a beeper and a sticker sheet. Take the stickers to the Outpatient nurses desk and place in the black tray. This is to notify staff that you have arrived. Then return to the lobby to wait.   When your beeper lights up and vibrates proceed through the double doors, under the stairs, and a member of the Outpatient Surgery staff will escort you to your preoperative room.  You may have to wear compression sleeves. These help to prevent blood clots and reduce swelling in your legs.  An IV may be inserted into one of your veins.              In the operating room, you may be given one or more of the following:        A medicine to help you relax (sedative).        A medicine to numb the area (local anesthetic).        A medicine to make you fall asleep (general anesthetic).        A medicine that is injected into an area of your body to numb everything below the                      injection site (regional anesthetic).  You may be given an antibiotic through your IV to help prevent infection.  Your surgical site will be marked or identified.    Contact a health care provider if you:  Develop a fever of more than 100.4°F (38°C) or other feelings of illness during the 48 hours before your surgery.  Have symptoms that get worse.  Have questions or concerns about your surgery.  Summary  Preparing for surgery can make the procedure go more smoothly and lower your risk of  complications.  Before surgery, make a list of questions and concerns to discuss with your surgeon. Ask about the risks and possible complications.  In the days or weeks before your surgery, follow all instructions from your health care provider. You may need to stop smoking, avoid alcohol, follow eating restrictions, and change or stop your regular medicines.  Contact your surgeon if you develop a fever or other signs of illness during the few days before your surgery.  This information is not intended to replace advice given to you by your health care provider. Make sure you discuss any questions you have with your health care provider.  Document Revised: 12/21/2018 Document Reviewed: 10/23/2018  [employer] Patient Education © 2021 [employer]         How to Use Chlorhexidine Before Surgery  Chlorhexidine gluconate (CHG) is a germ-killing (antiseptic) solution that is used to clean the skin. It can get rid of the bacteria that normally live on the skin and can keep them away for about 24 hours. To clean your skin with CHG, you may be given:  A CHG solution to use in the shower or as part of a sponge bath.  A prepackaged cloth that contains CHG.  Cleaning your skin with CHG may help lower the risk for infection:  While you are staying in the intensive care unit of the hospital.  If you have a vascular access, such as a central line, to provide short-term or long-term access to your veins.  If you have a catheter to drain urine from your bladder.  If you are on a ventilator. A ventilator is a machine that helps you breathe by moving air in and out of your lungs.  After surgery.  What are the risks?  Risks of using CHG include:  A skin reaction.  Hearing loss, if CHG gets in your ears and you have a perforated eardrum.  Eye injury, if CHG gets in your eyes and is not rinsed out.  The CHG product catching fire.  Make sure that you avoid smoking and flames after applying CHG to your skin.  Do not use CHG:  If you have  a chlorhexidine allergy or have previously reacted to chlorhexidine.  On babies younger than 2 months of age.  How to use CHG solution  Use CHG only as told by your health care provider, and follow the instructions on the label.  Use the full amount of CHG as directed. Usually, this is one bottle.  During a shower    Follow these steps when using CHG solution during a shower (unless your health care provider gives you different instructions):  Start the shower.  Use your normal soap and shampoo to wash your face and hair.  Turn off the shower or move out of the shower stream.  Pour the CHG onto a clean washcloth. Do not use any type of brush or rough-edged sponge.  Starting at your neck, lather your body down to your toes. Make sure you follow these instructions:  If you will be having surgery, pay special attention to the part of your body where you will be having surgery. Scrub this area for at least 1 minute.  Do not use CHG on your head or face. If the solution gets into your ears or eyes, rinse them well with water.  Avoid your genital area.  Avoid any areas of skin that have broken skin, cuts, or scrapes.  Scrub your back and under your arms. Make sure to wash skin folds.  Let the lather sit on your skin for 1-2 minutes or as long as told by your health care provider.  Thoroughly rinse your entire body in the shower. Make sure that all body creases and crevices are rinsed well.  Dry off with a clean towel. Do not put any substances on your body afterward--such as powder, lotion, or perfume--unless you are told to do so by your health care provider. Only use lotions that are recommended by the .  Put on clean clothes or pajamas.  If it is the night before your surgery, sleep in clean sheets.     During a sponge bath  Follow these steps when using CHG solution during a sponge bath (unless your health care provider gives you different instructions):  Use your normal soap and shampoo to wash your face  and hair.  Pour the CHG onto a clean washcloth.  Starting at your neck, lather your body down to your toes. Make sure you follow these instructions:  If you will be having surgery, pay special attention to the part of your body where you will be having surgery. Scrub this area for at least 1 minute.  Do not use CHG on your head or face. If the solution gets into your ears or eyes, rinse them well with water.  Avoid your genital area.  Avoid any areas of skin that have broken skin, cuts, or scrapes.  Scrub your back and under your arms. Make sure to wash skin folds.  Let the lather sit on your skin for 1-2 minutes or as long as told by your health care provider.  Using a different clean, wet washcloth, thoroughly rinse your entire body. Make sure that all body creases and crevices are rinsed well.  Dry off with a clean towel. Do not put any substances on your body afterward--such as powder, lotion, or perfume--unless you are told to do so by your health care provider. Only use lotions that are recommended by the .  Put on clean clothes or pajamas.  If it is the night before your surgery, sleep in clean sheets.  How to use CHG prepackaged cloths  Only use CHG cloths as told by your health care provider, and follow the instructions on the label.  Use the CHG cloth on clean, dry skin.  Do not use the CHG cloth on your head or face unless your health care provider tells you to.  When washing with the CHG cloth:  Avoid your genital area.  Avoid any areas of skin that have broken skin, cuts, or scrapes.  Before surgery    Follow these steps when using a CHG cloth to clean before surgery (unless your health care provider gives you different instructions):  Using the CHG cloth, vigorously scrub the part of your body where you will be having surgery. Scrub using a back-and-forth motion for 3 minutes. The area on your body should be completely wet with CHG when you are done scrubbing.  Do not rinse. Discard the  cloth and let the area air-dry. Do not put any substances on the area afterward, such as powder, lotion, or perfume.  Put on clean clothes or pajamas.  If it is the night before your surgery, sleep in clean sheets.     For general bathing  Follow these steps when using CHG cloths for general bathing (unless your health care provider gives you different instructions).  Use a separate CHG cloth for each area of your body. Make sure you wash between any folds of skin and between your fingers and toes. Wash your body in the following order, switching to a new cloth after each step:  The front of your neck, shoulders, and chest.  Both of your arms, under your arms, and your hands.  Your stomach and groin area, avoiding the genitals.  Your right leg and foot.  Your left leg and foot.  The back of your neck, your back, and your buttocks.  Do not rinse. Discard the cloth and let the area air-dry. Do not put any substances on your body afterward--such as powder, lotion, or perfume--unless you are told to do so by your health care provider. Only use lotions that are recommended by the .  Put on clean clothes or pajamas.  Contact a health care provider if:  Your skin gets irritated after scrubbing.  You have questions about using your solution or cloth.  You swallow any chlorhexidine. Call your local poison control center (1-976.701.3266 in the [place]  Get help right away if:  Your eyes itch badly, or they become very red or swollen.  Your skin itches badly and is red or swollen.  Your hearing changes.  You have trouble seeing.  You have swelling or tingling in your mouth or throat.  You have trouble breathing.  These symptoms may represent a serious problem that is an emergency. Do not wait to see if the symptoms will go away. Get medical help right away. Call your local emergency services (422 in the [place] Do not drive yourself to the hospital.  Summary  Chlorhexidine gluconate (CHG) is a germ-killing  (antiseptic) solution that is used to clean the skin. Cleaning your skin with CHG may help to lower your risk for infection.  You may be given CHG to use for bathing. It may be in a bottle or in a prepackaged cloth to use on your skin. Carefully follow your health care provider's instructions and the instructions on the product label.  Do not use CHG if you have a chlorhexidine allergy.  Contact your health care provider if your skin gets irritated after scrubbing.  This information is not intended to replace advice given to you by your health care provider. Make sure you discuss any questions you have with your health care provider.  Document Revised: 04/17/2023 Document Reviewed: 02/28/2022  [employer] Patient Education © 2023 [employer]

## 2023-10-31 LAB
QT INTERVAL: 502 MS
QTC INTERVAL: 466 MS

## 2023-11-13 ENCOUNTER — ANESTHESIA EVENT (OUTPATIENT)
Dept: PERIOP | Facility: HOSPITAL | Age: 58
End: 2023-11-13
Payer: MEDICAID

## 2023-11-13 ENCOUNTER — APPOINTMENT (OUTPATIENT)
Dept: GENERAL RADIOLOGY | Facility: HOSPITAL | Age: 58
End: 2023-11-13
Payer: MEDICAID

## 2023-11-13 ENCOUNTER — ANESTHESIA (OUTPATIENT)
Dept: PERIOP | Facility: HOSPITAL | Age: 58
End: 2023-11-13
Payer: MEDICAID

## 2023-11-13 ENCOUNTER — HOSPITAL ENCOUNTER (OUTPATIENT)
Facility: HOSPITAL | Age: 58
Setting detail: HOSPITAL OUTPATIENT SURGERY
Discharge: HOME | End: 2023-11-13
Attending: ORTHOPAEDIC SURGERY | Admitting: ORTHOPAEDIC SURGERY
Payer: MEDICAID

## 2023-11-13 VITALS
TEMPERATURE: 98.8 F | RESPIRATION RATE: 16 BRPM | HEART RATE: 58 BPM | DIASTOLIC BLOOD PRESSURE: 75 MMHG | OXYGEN SATURATION: 96 % | SYSTOLIC BLOOD PRESSURE: 144 MMHG | BODY MASS INDEX: 33.6 KG/M2 | WEIGHT: 249.12 LBS

## 2023-11-13 DIAGNOSIS — M48.061 LUMBAR STENOSIS WITHOUT NEUROGENIC CLAUDICATION: Primary | ICD-10-CM

## 2023-11-13 LAB
ABO GROUP BLD: NORMAL
ANION GAP SERPL CALCULATED.3IONS-SCNC: 7 MMOL/L (ref 5–15)
BLD GP AB SCN SERPL QL: NEGATIVE
BUN SERPL-MCNC: 9 MG/DL (ref 6–20)
BUN/CREAT SERPL: 11.4 (ref 7–25)
CALCIUM SPEC-SCNC: 8.8 MG/DL (ref 8.6–10.5)
CHLORIDE SERPL-SCNC: 104 MMOL/L (ref 98–107)
CO2 SERPL-SCNC: 33 MMOL/L (ref 22–29)
CREAT SERPL-MCNC: 0.79 MG/DL (ref 0.76–1.27)
EGFRCR SERPLBLD CKD-EPI 2021: 103.6 ML/MIN/1.73
GLUCOSE SERPL-MCNC: 85 MG/DL (ref 65–99)
POTASSIUM SERPL-SCNC: 3.2 MMOL/L (ref 3.5–5.2)
RH BLD: NEGATIVE
SODIUM SERPL-SCNC: 144 MMOL/L (ref 136–145)
T&S EXPIRATION DATE: NORMAL

## 2023-11-13 PROCEDURE — 25810000003 LACTATED RINGERS PER 1000 ML: Performed by: ANESTHESIOLOGY

## 2023-11-13 PROCEDURE — 25810000003 LACTATED RINGERS PER 1000 ML: Performed by: ORTHOPAEDIC SURGERY

## 2023-11-13 PROCEDURE — 25010000002 DEXAMETHASONE PER 1 MG

## 2023-11-13 PROCEDURE — 25010000002 FENTANYL CITRATE (PF) 50 MCG/ML SOLUTION: Performed by: ANESTHESIOLOGY

## 2023-11-13 PROCEDURE — 25010000002 FENTANYL CITRATE (PF) 100 MCG/2ML SOLUTION

## 2023-11-13 PROCEDURE — 86850 RBC ANTIBODY SCREEN: CPT | Performed by: ORTHOPAEDIC SURGERY

## 2023-11-13 PROCEDURE — 72100 X-RAY EXAM L-S SPINE 2/3 VWS: CPT

## 2023-11-13 PROCEDURE — 0 BUPIVACAINE LIPOSOME 1.3 % SUSPENSION 10 ML VIAL: Performed by: ORTHOPAEDIC SURGERY

## 2023-11-13 PROCEDURE — 25010000002 ONDANSETRON PER 1 MG: Performed by: NURSE ANESTHETIST, CERTIFIED REGISTERED

## 2023-11-13 PROCEDURE — 86901 BLOOD TYPING SEROLOGIC RH(D): CPT | Performed by: ORTHOPAEDIC SURGERY

## 2023-11-13 PROCEDURE — C9290 INJ, BUPIVACAINE LIPOSOME: HCPCS | Performed by: ORTHOPAEDIC SURGERY

## 2023-11-13 PROCEDURE — 25010000002 HYDROMORPHONE PER 4 MG: Performed by: ANESTHESIOLOGY

## 2023-11-13 PROCEDURE — 86900 BLOOD TYPING SEROLOGIC ABO: CPT | Performed by: ORTHOPAEDIC SURGERY

## 2023-11-13 PROCEDURE — 25010000002 PROPOFOL 10 MG/ML EMULSION

## 2023-11-13 PROCEDURE — 25010000002 MIDAZOLAM PER 1 MG: Performed by: ANESTHESIOLOGY

## 2023-11-13 PROCEDURE — 25010000002 DEXAMETHASONE PER 1 MG: Performed by: ANESTHESIOLOGY

## 2023-11-13 PROCEDURE — 80048 BASIC METABOLIC PNL TOTAL CA: CPT | Performed by: ANESTHESIOLOGY

## 2023-11-13 PROCEDURE — 25010000002 CEFAZOLIN PER 500 MG: Performed by: ORTHOPAEDIC SURGERY

## 2023-11-13 PROCEDURE — 76000 FLUOROSCOPY <1 HR PHYS/QHP: CPT

## 2023-11-13 DEVICE — KT HEMOST ABS SURGIFOAM PORCN 1GRAM: Type: IMPLANTABLE DEVICE | Site: SPINE LUMBAR | Status: FUNCTIONAL

## 2023-11-13 RX ORDER — OXYCODONE AND ACETAMINOPHEN 10; 325 MG/1; MG/1
1 TABLET ORAL EVERY 6 HOURS PRN
Qty: 12 TABLET | Refills: 0 | Status: SHIPPED | OUTPATIENT
Start: 2023-11-13

## 2023-11-13 RX ORDER — ONDANSETRON 2 MG/ML
INJECTION INTRAMUSCULAR; INTRAVENOUS AS NEEDED
Status: DISCONTINUED | OUTPATIENT
Start: 2023-11-13 | End: 2023-11-13 | Stop reason: SURG

## 2023-11-13 RX ORDER — ACETAMINOPHEN 500 MG
1000 TABLET ORAL ONCE
Status: COMPLETED | OUTPATIENT
Start: 2023-11-13 | End: 2023-11-13

## 2023-11-13 RX ORDER — PROPOFOL 10 MG/ML
VIAL (ML) INTRAVENOUS AS NEEDED
Status: DISCONTINUED | OUTPATIENT
Start: 2023-11-13 | End: 2023-11-13 | Stop reason: SURG

## 2023-11-13 RX ORDER — DOXYCYCLINE HYCLATE 100 MG
200 TABLET ORAL DAILY
COMMUNITY

## 2023-11-13 RX ORDER — FENTANYL CITRATE 50 UG/ML
25 INJECTION, SOLUTION INTRAMUSCULAR; INTRAVENOUS
Status: DISCONTINUED | OUTPATIENT
Start: 2023-11-13 | End: 2023-11-13 | Stop reason: HOSPADM

## 2023-11-13 RX ORDER — DROPERIDOL 2.5 MG/ML
0.62 INJECTION, SOLUTION INTRAMUSCULAR; INTRAVENOUS ONCE AS NEEDED
Status: DISCONTINUED | OUTPATIENT
Start: 2023-11-13 | End: 2023-11-13 | Stop reason: HOSPADM

## 2023-11-13 RX ORDER — FLUMAZENIL 0.1 MG/ML
0.2 INJECTION INTRAVENOUS AS NEEDED
Status: DISCONTINUED | OUTPATIENT
Start: 2023-11-13 | End: 2023-11-13 | Stop reason: HOSPADM

## 2023-11-13 RX ORDER — ROCURONIUM BROMIDE 10 MG/ML
INJECTION, SOLUTION INTRAVENOUS AS NEEDED
Status: DISCONTINUED | OUTPATIENT
Start: 2023-11-13 | End: 2023-11-13 | Stop reason: SURG

## 2023-11-13 RX ORDER — DEXAMETHASONE SODIUM PHOSPHATE 4 MG/ML
INJECTION, SOLUTION INTRA-ARTICULAR; INTRALESIONAL; INTRAMUSCULAR; INTRAVENOUS; SOFT TISSUE AS NEEDED
Status: DISCONTINUED | OUTPATIENT
Start: 2023-11-13 | End: 2023-11-13 | Stop reason: SURG

## 2023-11-13 RX ORDER — OXYCODONE AND ACETAMINOPHEN 10; 325 MG/1; MG/1
1 TABLET ORAL ONCE AS NEEDED
Status: COMPLETED | OUTPATIENT
Start: 2023-11-13 | End: 2023-11-13

## 2023-11-13 RX ORDER — ONDANSETRON 2 MG/ML
4 INJECTION INTRAMUSCULAR; INTRAVENOUS
Status: DISCONTINUED | OUTPATIENT
Start: 2023-11-13 | End: 2023-11-13 | Stop reason: HOSPADM

## 2023-11-13 RX ORDER — SODIUM CHLORIDE 9 MG/ML
40 INJECTION, SOLUTION INTRAVENOUS AS NEEDED
Status: DISCONTINUED | OUTPATIENT
Start: 2023-11-13 | End: 2023-11-13 | Stop reason: HOSPADM

## 2023-11-13 RX ORDER — HYDROMORPHONE HYDROCHLORIDE 1 MG/ML
0.5 INJECTION, SOLUTION INTRAMUSCULAR; INTRAVENOUS; SUBCUTANEOUS
Status: DISCONTINUED | OUTPATIENT
Start: 2023-11-13 | End: 2023-11-13 | Stop reason: HOSPADM

## 2023-11-13 RX ORDER — FENTANYL CITRATE 50 UG/ML
INJECTION, SOLUTION INTRAMUSCULAR; INTRAVENOUS AS NEEDED
Status: DISCONTINUED | OUTPATIENT
Start: 2023-11-13 | End: 2023-11-13 | Stop reason: SURG

## 2023-11-13 RX ORDER — IBUPROFEN 600 MG/1
600 TABLET ORAL ONCE AS NEEDED
Status: DISCONTINUED | OUTPATIENT
Start: 2023-11-13 | End: 2023-11-13 | Stop reason: HOSPADM

## 2023-11-13 RX ORDER — NEOSTIGMINE METHYLSULFATE 5 MG/5 ML
SYRINGE (ML) INTRAVENOUS AS NEEDED
Status: DISCONTINUED | OUTPATIENT
Start: 2023-11-13 | End: 2023-11-13 | Stop reason: SURG

## 2023-11-13 RX ORDER — MAGNESIUM HYDROXIDE 1200 MG/15ML
LIQUID ORAL AS NEEDED
Status: DISCONTINUED | OUTPATIENT
Start: 2023-11-13 | End: 2023-11-13 | Stop reason: HOSPADM

## 2023-11-13 RX ORDER — SODIUM CHLORIDE, SODIUM LACTATE, POTASSIUM CHLORIDE, CALCIUM CHLORIDE 600; 310; 30; 20 MG/100ML; MG/100ML; MG/100ML; MG/100ML
100 INJECTION, SOLUTION INTRAVENOUS CONTINUOUS
Status: DISCONTINUED | OUTPATIENT
Start: 2023-11-13 | End: 2023-11-13 | Stop reason: HOSPADM

## 2023-11-13 RX ORDER — NALOXONE HYDROCHLORIDE 4 MG/.1ML
SPRAY NASAL
Qty: 2 EACH | Refills: 0 | Status: SHIPPED | OUTPATIENT
Start: 2023-11-13

## 2023-11-13 RX ORDER — LABETALOL HYDROCHLORIDE 5 MG/ML
5 INJECTION, SOLUTION INTRAVENOUS
Status: DISCONTINUED | OUTPATIENT
Start: 2023-11-13 | End: 2023-11-13 | Stop reason: HOSPADM

## 2023-11-13 RX ORDER — MIDAZOLAM HYDROCHLORIDE 1 MG/ML
2 INJECTION INTRAMUSCULAR; INTRAVENOUS
Status: DISCONTINUED | OUTPATIENT
Start: 2023-11-13 | End: 2023-11-13 | Stop reason: HOSPADM

## 2023-11-13 RX ORDER — SODIUM CHLORIDE 0.9 % (FLUSH) 0.9 %
10 SYRINGE (ML) INJECTION AS NEEDED
Status: DISCONTINUED | OUTPATIENT
Start: 2023-11-13 | End: 2023-11-13 | Stop reason: HOSPADM

## 2023-11-13 RX ORDER — OXYCODONE HCL 20 MG/1
20 TABLET, FILM COATED, EXTENDED RELEASE ORAL ONCE
Status: COMPLETED | OUTPATIENT
Start: 2023-11-13 | End: 2023-11-13

## 2023-11-13 RX ORDER — DEXAMETHASONE SODIUM PHOSPHATE 4 MG/ML
4 INJECTION, SOLUTION INTRA-ARTICULAR; INTRALESIONAL; INTRAMUSCULAR; INTRAVENOUS; SOFT TISSUE ONCE AS NEEDED
Status: COMPLETED | OUTPATIENT
Start: 2023-11-13 | End: 2023-11-13

## 2023-11-13 RX ORDER — EPHEDRINE SULFATE 50 MG/ML
INJECTION, SOLUTION INTRAVENOUS AS NEEDED
Status: DISCONTINUED | OUTPATIENT
Start: 2023-11-13 | End: 2023-11-13 | Stop reason: SURG

## 2023-11-13 RX ORDER — NALOXONE HCL 0.4 MG/ML
0.04 VIAL (ML) INJECTION AS NEEDED
Status: DISCONTINUED | OUTPATIENT
Start: 2023-11-13 | End: 2023-11-13 | Stop reason: HOSPADM

## 2023-11-13 RX ORDER — LIDOCAINE HYDROCHLORIDE 20 MG/ML
INJECTION, SOLUTION EPIDURAL; INFILTRATION; INTRACAUDAL; PERINEURAL AS NEEDED
Status: DISCONTINUED | OUTPATIENT
Start: 2023-11-13 | End: 2023-11-13 | Stop reason: SURG

## 2023-11-13 RX ORDER — SODIUM CHLORIDE, SODIUM LACTATE, POTASSIUM CHLORIDE, CALCIUM CHLORIDE 600; 310; 30; 20 MG/100ML; MG/100ML; MG/100ML; MG/100ML
1000 INJECTION, SOLUTION INTRAVENOUS CONTINUOUS
Status: DISCONTINUED | OUTPATIENT
Start: 2023-11-13 | End: 2023-11-13 | Stop reason: HOSPADM

## 2023-11-13 RX ORDER — SODIUM CHLORIDE 0.9 % (FLUSH) 0.9 %
3-10 SYRINGE (ML) INJECTION AS NEEDED
Status: DISCONTINUED | OUTPATIENT
Start: 2023-11-13 | End: 2023-11-13 | Stop reason: HOSPADM

## 2023-11-13 RX ORDER — LIDOCAINE HYDROCHLORIDE 10 MG/ML
0.5 INJECTION, SOLUTION EPIDURAL; INFILTRATION; INTRACAUDAL; PERINEURAL ONCE AS NEEDED
Status: DISCONTINUED | OUTPATIENT
Start: 2023-11-13 | End: 2023-11-13 | Stop reason: HOSPADM

## 2023-11-13 RX ORDER — SODIUM CHLORIDE 0.9 % (FLUSH) 0.9 %
3 SYRINGE (ML) INJECTION EVERY 12 HOURS SCHEDULED
Status: DISCONTINUED | OUTPATIENT
Start: 2023-11-13 | End: 2023-11-13 | Stop reason: HOSPADM

## 2023-11-13 RX ADMIN — ONDANSETRON 4 MG: 2 INJECTION INTRAMUSCULAR; INTRAVENOUS at 16:59

## 2023-11-13 RX ADMIN — EPHEDRINE SULFATE 10 MG: 50 INJECTION INTRAVENOUS at 15:58

## 2023-11-13 RX ADMIN — DEXAMETHASONE SODIUM PHOSPHATE 8 MG: 4 INJECTION, SOLUTION INTRA-ARTICULAR; INTRALESIONAL; INTRAMUSCULAR; INTRAVENOUS; SOFT TISSUE at 14:23

## 2023-11-13 RX ADMIN — EPHEDRINE SULFATE 10 MG: 50 INJECTION INTRAVENOUS at 14:22

## 2023-11-13 RX ADMIN — HYDROMORPHONE HYDROCHLORIDE 0.5 MG: 1 INJECTION, SOLUTION INTRAMUSCULAR; INTRAVENOUS; SUBCUTANEOUS at 17:45

## 2023-11-13 RX ADMIN — FENTANYL CITRATE 100 MCG: 50 INJECTION, SOLUTION INTRAMUSCULAR; INTRAVENOUS at 14:01

## 2023-11-13 RX ADMIN — SODIUM CHLORIDE, POTASSIUM CHLORIDE, SODIUM LACTATE AND CALCIUM CHLORIDE: 600; 310; 30; 20 INJECTION, SOLUTION INTRAVENOUS at 16:00

## 2023-11-13 RX ADMIN — FENTANYL CITRATE 100 MCG: 50 INJECTION, SOLUTION INTRAMUSCULAR; INTRAVENOUS at 14:36

## 2023-11-13 RX ADMIN — ROCURONIUM BROMIDE 30 MG: 10 INJECTION, SOLUTION INTRAVENOUS at 14:48

## 2023-11-13 RX ADMIN — ACETAMINOPHEN 1000 MG: 500 TABLET, FILM COATED ORAL at 11:50

## 2023-11-13 RX ADMIN — ROCURONIUM BROMIDE 50 MG: 10 INJECTION, SOLUTION INTRAVENOUS at 14:03

## 2023-11-13 RX ADMIN — Medication 3 MG: at 16:54

## 2023-11-13 RX ADMIN — SODIUM CHLORIDE, POTASSIUM CHLORIDE, SODIUM LACTATE AND CALCIUM CHLORIDE 100 ML/HR: 600; 310; 30; 20 INJECTION, SOLUTION INTRAVENOUS at 12:41

## 2023-11-13 RX ADMIN — CEFAZOLIN 2000 MG: 2 INJECTION, POWDER, FOR SOLUTION INTRAMUSCULAR; INTRAVENOUS at 14:20

## 2023-11-13 RX ADMIN — SODIUM CHLORIDE, POTASSIUM CHLORIDE, SODIUM LACTATE AND CALCIUM CHLORIDE 100 ML/HR: 600; 310; 30; 20 INJECTION, SOLUTION INTRAVENOUS at 09:43

## 2023-11-13 RX ADMIN — FENTANYL CITRATE 25 MCG: 50 INJECTION, SOLUTION INTRAMUSCULAR; INTRAVENOUS at 17:47

## 2023-11-13 RX ADMIN — PROPOFOL 250 MG: 10 INJECTION, EMULSION INTRAVENOUS at 14:03

## 2023-11-13 RX ADMIN — HYDROMORPHONE HYDROCHLORIDE 0.5 MG: 1 INJECTION, SOLUTION INTRAMUSCULAR; INTRAVENOUS; SUBCUTANEOUS at 18:10

## 2023-11-13 RX ADMIN — FENTANYL CITRATE 100 MCG: 50 INJECTION, SOLUTION INTRAMUSCULAR; INTRAVENOUS at 16:27

## 2023-11-13 RX ADMIN — GLYCOPYRROLATE 0.2 MG: 0.2 INJECTION INTRAMUSCULAR; INTRAVENOUS at 14:54

## 2023-11-13 RX ADMIN — LIDOCAINE HYDROCHLORIDE 100 MG: 20 INJECTION, SOLUTION EPIDURAL; INFILTRATION; INTRACAUDAL; PERINEURAL at 14:03

## 2023-11-13 RX ADMIN — FENTANYL CITRATE 25 MCG: 50 INJECTION, SOLUTION INTRAMUSCULAR; INTRAVENOUS at 17:52

## 2023-11-13 RX ADMIN — MIDAZOLAM HYDROCHLORIDE 2 MG: 1 INJECTION, SOLUTION INTRAMUSCULAR; INTRAVENOUS at 11:50

## 2023-11-13 RX ADMIN — OXYCODONE HYDROCHLORIDE 20 MG: 20 TABLET, FILM COATED, EXTENDED RELEASE ORAL at 09:43

## 2023-11-13 RX ADMIN — HYDROMORPHONE HYDROCHLORIDE 0.5 MG: 1 INJECTION, SOLUTION INTRAMUSCULAR; INTRAVENOUS; SUBCUTANEOUS at 17:57

## 2023-11-13 RX ADMIN — DEXAMETHASONE SODIUM PHOSPHATE 4 MG: 4 INJECTION INTRA-ARTICULAR; INTRALESIONAL; INTRAMUSCULAR; INTRAVENOUS; SOFT TISSUE at 11:50

## 2023-11-13 RX ADMIN — GLYCOPYRROLATE 0.4 MG: 0.2 INJECTION INTRAMUSCULAR; INTRAVENOUS at 16:54

## 2023-11-13 RX ADMIN — OXYCODONE HYDROCHLORIDE AND ACETAMINOPHEN 1 TABLET: 10; 325 TABLET ORAL at 18:15

## 2023-11-13 NOTE — OP NOTE
LUMBAR LAMINECTOMY POSTERIOR LUMBAR INTERBODY FUSION  Procedure Note    Kurtis Valladares  11/13/2023    Pre-op Diagnosis:      1. Chronic low back pain.   2. Left buttock, thigh and leg radiculopathy.   3. Neurogenic claudication.   4. Multilevel thoracolumbar degenerative disc disease, worse L3 to S1.   5. Multilevel lumbar facet arthropathy, worse L3 to S1.   6. Chronic central disc herniation at L3-4, L5-S1.   7. Degenerative scoliosis L3 to S1, concave right L3-4, concave left L5-S1.   8. Severe central and foraminal stenosis L3 to S1, worse central L3-4, foraminal left L5-S1.      Post-op Diagnosis:     same     Procedure/CPT® Codes:    1.  Hemilaminectomy decompression with partial facetectomy and neural foraminotomy bilateral L3-4, left L5-S1  2.  Use of fluoroscopy for confirmation of surgical level  3.  Use of operating room microscope for decompression and microdissection  4.  Intraoperative neuromonitoring    Anesthesia: General    Surgeon: FILIBERTO Iyer MD    Assistant: Mickey Crowley PA-C    Estimated Blood Loss: 50 mL    Complications: None    Condition: Stable to PACU.    Indications:    The patient is a 58-year-old who sees Shira Rushing nurse practitioner for medical issues.  He presented to the office with chronic low back pain along with left buttock, thigh, and leg radiculopathy as well as symptoms consistent with neurogenic claudication.  Imaging studies revealed multilevel thoracolumbar degenerative disc disease and facet arthropathy that was worse from L3-S1.  He was noted to have chronic central disc herniations at both L3-4 and L5-S1, where there was severe central stenosis mainly at L3-4 and severe left-sided foraminal stenosis at L5-S1.    After failing conservative measures, it was mutually decided that surgery would be the best option. Risks, benefits, and complications of surgery were discussed with the patient. The patient appeared well informed and wished to proceed. We  specifically discussed the risk of infection, blood loss, nerve root injury, CSF leak, and the possibility of incomplete resolution of symptoms. We also discussed the possible risk of a nonunion and the potential need for additional surgery in the event of a pseudoarthrosis.    Operative Procedure:    After obtaining informed consent and verifying the correct operative site, the patient was brought to the operating room.  A general anesthetic was provided by the anesthesia service with the assistance of an endotracheal tube.  Once this was appropriately positioned and secured, the patient was carefully rotated prone onto a Jose frame.  All bony processes were well-padded.  The lumbar region was prepped and draped in the usual sterile fashion.  A surgical timeout was taken to confirm this was the correct patient, we were working at the correct levels, and that preoperative antibiotics were given in a timely fashion.    Next using fluoroscopy for guidance, a midline posterior lumbar incision was created using a 10 blade scalpel spanning approximately L3 to S1.  Dissection was carried through subcutaneous tissues using Bovie cautery.  The lumbar fascia was exposed and divided in line with the spinous processes at L3-4.  Dissection was carried on either side of the spinous processes exposing the interlaminar areas and the medial portions of the facet joints.  Self retaining retractors were placed for continuous exposure.  Fluoroscopy was used with a forward angled curette under the L3 lamina to confirm that we were indeed at the correct level.  Large curettes were used to remove soft tissues off of the lamina and spinous processes of L3 and L4.  The wound was then copiously irrigated with saline solution.  At this point, the microscope was positioned for the remaining microdissection portion of the procedure.      The high-speed bur was used to start hemilaminotomies bilaterally at L3-4.  This was also used to  perform a partial medial facetectomy.  The facet joints themselves were markedly hypertrophied as expected based on preoperative imaging and contributing to severe stenosis.  I carefully drilled out as much of the medial facet as safely possible.  Once the central spinal canal was identified with a small section of dura exposed, I was able to use a forward angled curette to better define the anatomy.  The forward angled curette was used to loosen up the ligamentum flavum which was resected using Kerrisons.  It also was hypertrophied consistent with preoperative imaging and contributing to central stenosis.  Using a combination of the high-speed bur and Kerrisons, the remaining stenosis was decompressed by removing the undersurface of the spinous process and medial lamina as well as the medial portion of the facet joint.  This was performed bilaterally at L3-4.  Once the decompression was felt to be adequate, the epidural space was thoroughly irrigated with saline solution.  Bleeding was controlled in the epidural space using thrombin with Gelfoam powder and packing.    At this point, I turned my attention to L5-S1.  The fascia overlying the spinous processes of L5 and S1 was divided in line with the incision using Bovie cautery and dissection was carried to the left side of the spinous processes further exposing the interlaminar space.  I exposed the medial portion of the facet joint as an anatomic marker.  The self-retaining retractors were then positioned and centered over this level.    The high-speed bur was then used to create a left-sided hemilaminotomy at L5-S1 and to perform a partial medial facetectomy.  Once again there was marked stenosis as a result of facet joint overgrowth mainly.  I used a combination of the high-speed drill and Kerrisons to complete the hemilaminotomy and partial medial facetectomy decompression.  Ligamentum flavum was removed using Kerrisons as well which was also contributing to  stenosis.  The L5 nerve root was identified and decompressed as much as possible as it exited just above the L5-S1 disc space.    The entire wound was then copiously irrigated with saline solution.  A thorough inspection of the epidural space was then undertaken to ensure that we had adequate hemostasis.  Bleeding laterally in the epidural space was controlled using thrombin with Gelfoam powder and packing.  At the end of the decompression, the central spinal canal and all exiting nerve roots appeared to be free of areas of compression at L3-4 bilaterally and on the left at L5-S1.    After ensuring that we had adequate hemostasis the self retaining retractors were removed without difficulty.  Closure of the fascia was accomplished using a #1 Vicryl.  Skin closure was then accomplished by reapproximating the upper layer with a 2-0 Vicryl followed by Mastisol and Steri-Strips.  The wound was then sterilely dressed.  The patient was then carefully rotated supine onto a hospital gurney, extubated and sent to the recovery room in good stable condition.  The patient tolerated the procedure well.  There were no complications.  I estimated blood loss to be approximately 50 mL and the patient remained hemodynamically stable during the procedure.    Intraoperative neuro monitoring was ordered and carried out throughout the procedure to add an increased level of safety for the patient.  The interpreting physician was available by means of real-time continuous, bidirectional, remote audio and visual communication as needed throughout the entire procedure.  Modalities used during the procedure included SSEP, EEG, EMG, and TOF.  There were no neuro monitoring signal changes during the procedure.    Mickey Crowley PA-C provided critical assistance during the procedure.  His assistance was medically necessary in order to allow the procedure to occur in the most safe and efficient manner.    FILIBERTO Iyer MD     Date:  11/13/2023  Time: 17:12 CST

## 2023-11-13 NOTE — ANESTHESIA PREPROCEDURE EVALUATION
Anesthesia Evaluation     Patient summary reviewed   no history of anesthetic complications:   NPO Solid Status: > 8 hours  NPO Liquid Status: > 8 hours           Airway   Mallampati: II  TM distance: >3 FB  No difficulty expected  Dental - normal exam     Pulmonary    (+) a smoker Former, asthma,sleep apnea (could not tolerate CPAP)  Cardiovascular   Exercise tolerance: excellent (>7 METS)    ECG reviewed    (+) hypertension      Neuro/Psych  GI/Hepatic/Renal/Endo    (+) GERD  (-) liver disease, no renal disease, diabetes    ROS Comment: S/p gastric sleeve    Musculoskeletal     Abdominal    Substance History      OB/GYN          Other                    Anesthesia Plan    ASA 2     general     intravenous induction     Anesthetic plan, risks, benefits, and alternatives have been provided, discussed and informed consent has been obtained with: patient.    CODE STATUS:

## 2023-11-13 NOTE — ANESTHESIA PROCEDURE NOTES
Airway  Urgency: elective    Date/Time: 11/13/2023 2:04 PM  Airway not difficult    General Information and Staff    Patient location during procedure: OR    Indications and Patient Condition  Indications for airway management: airway protection    Preoxygenated: yes  MILS maintained throughout  Mask difficulty assessment: 0 - not attempted    Final Airway Details  Final airway type: endotracheal airway      Successful airway: ETT  Cuffed: yes   Successful intubation technique: direct laryngoscopy  Facilitating devices/methods: intubating stylet  Endotracheal tube insertion site: oral  Blade: Rolf  Blade size: 3.5  ETT size (mm): 7.5  Cormack-Lehane Classification: grade I - full view of glottis  Placement verified by: chest auscultation and capnometry   Cuff volume (mL): 10  Measured from: teeth  ETT/EBT  to teeth (cm): 22  Number of attempts at approach: 1  Assessment: lips, teeth, and gum same as pre-op and atraumatic intubation

## 2023-11-14 NOTE — ANESTHESIA POSTPROCEDURE EVALUATION
Patient: Kurtis Valladares    Procedure Summary       Date: 11/13/23 Room / Location: [facility] OR  /  [facility] OR    Anesthesia Start: 1358 Anesthesia Stop: 1723    Procedure: HEMILAMINECTOMY, DECOMPRESSION, PARTIAL FACETECTOMY, FORAMINOTOMY BILATERAL L3-4, LEFT L5-S1 (Spine Lumbar) Diagnosis: (M54.16)    Surgeons: PAWEL Iyer MD Provider: Mk Mcpherson CRNA    Anesthesia Type: general ASA Status: 2            Anesthesia Type: general    Vitals  Vitals Value Taken Time   /83 11/13/23 1831   Temp 98.8 °F (37.1 °C) 11/13/23 1815   Pulse 51 11/13/23 1837   Resp 12 11/13/23 1830   SpO2 96 % 11/13/23 1836   Vitals shown include unfiled device data.        Post Anesthesia Care and Evaluation    Patient location during evaluation: PACU  Patient participation: complete - patient participated  Level of consciousness: awake and alert  Pain management: adequate    Airway patency: patent  Anesthetic complications: No anesthetic complications    Cardiovascular status: acceptable  Respiratory status: acceptable  Hydration status: acceptable    Comments: Blood pressure 144/75, pulse 58, temperature 98.8 °F (37.1 °C), temperature source Temporal, resp. rate 16, weight 113 kg (249 lb 1.9 oz), SpO2 96%.    Pt discharged from PACU based on nettie score >8

## 2023-12-13 ENCOUNTER — HOSPITAL ENCOUNTER (OUTPATIENT)
Facility: HOSPITAL | Age: 58
Setting detail: SURGERY ADMIT
Discharge: HOME | End: 2023-12-13
Attending: ORTHOPAEDIC SURGERY | Admitting: ORTHOPAEDIC SURGERY
Payer: MEDICAID

## 2023-12-13 ENCOUNTER — ANESTHESIA EVENT (OUTPATIENT)
Dept: PERIOP | Facility: HOSPITAL | Age: 58
End: 2023-12-13
Payer: MEDICAID

## 2023-12-13 ENCOUNTER — ANESTHESIA (OUTPATIENT)
Dept: PERIOP | Facility: HOSPITAL | Age: 58
End: 2023-12-13
Payer: MEDICAID

## 2023-12-13 VITALS
RESPIRATION RATE: 16 BRPM | HEIGHT: 72 IN | BODY MASS INDEX: 33.26 KG/M2 | HEART RATE: 72 BPM | OXYGEN SATURATION: 98 % | SYSTOLIC BLOOD PRESSURE: 108 MMHG | TEMPERATURE: 98 F | WEIGHT: 245.59 LBS | DIASTOLIC BLOOD PRESSURE: 72 MMHG

## 2023-12-13 LAB
ABO GROUP BLD: NORMAL
ANION GAP SERPL CALCULATED.3IONS-SCNC: 6 MMOL/L (ref 5–15)
BLD GP AB SCN SERPL QL: NEGATIVE
BUN SERPL-MCNC: 11 MG/DL (ref 6–20)
BUN/CREAT SERPL: 14.1 (ref 7–25)
CALCIUM SPEC-SCNC: 9 MG/DL (ref 8.6–10.5)
CHLORIDE SERPL-SCNC: 101 MMOL/L (ref 98–107)
CO2 SERPL-SCNC: 34 MMOL/L (ref 22–29)
CREAT SERPL-MCNC: 0.78 MG/DL (ref 0.76–1.27)
DEPRECATED RDW RBC AUTO: 43.3 FL (ref 37–54)
EGFRCR SERPLBLD CKD-EPI 2021: 104 ML/MIN/1.73
ERYTHROCYTE [DISTWIDTH] IN BLOOD BY AUTOMATED COUNT: 13.7 % (ref 12.3–15.4)
GLUCOSE SERPL-MCNC: 101 MG/DL (ref 65–99)
HCT VFR BLD AUTO: 33.7 % (ref 37.5–51)
HGB BLD-MCNC: 10.5 G/DL (ref 13–17.7)
MCH RBC QN AUTO: 27.2 PG (ref 26.6–33)
MCHC RBC AUTO-ENTMCNC: 31.2 G/DL (ref 31.5–35.7)
MCV RBC AUTO: 87.3 FL (ref 79–97)
PLATELET # BLD AUTO: 280 10*3/MM3 (ref 140–450)
PMV BLD AUTO: 9.3 FL (ref 6–12)
POTASSIUM SERPL-SCNC: 2.6 MMOL/L (ref 3.5–5.2)
RBC # BLD AUTO: 3.86 10*6/MM3 (ref 4.14–5.8)
RH BLD: NEGATIVE
SODIUM SERPL-SCNC: 141 MMOL/L (ref 136–145)
T&S EXPIRATION DATE: NORMAL
WBC NRBC COR # BLD AUTO: 8.01 10*3/MM3 (ref 3.4–10.8)

## 2023-12-13 PROCEDURE — 86900 BLOOD TYPING SEROLOGIC ABO: CPT | Performed by: ORTHOPAEDIC SURGERY

## 2023-12-13 PROCEDURE — 25010000002 ONDANSETRON PER 1 MG: Performed by: NURSE ANESTHETIST, CERTIFIED REGISTERED

## 2023-12-13 PROCEDURE — 25010000002 CEFAZOLIN PER 500 MG: Performed by: ORTHOPAEDIC SURGERY

## 2023-12-13 PROCEDURE — 25010000002 HYDROMORPHONE PER 4 MG: Performed by: ANESTHESIOLOGY

## 2023-12-13 PROCEDURE — 25010000002 FENTANYL CITRATE (PF) 50 MCG/ML SOLUTION: Performed by: ANESTHESIOLOGY

## 2023-12-13 PROCEDURE — 25010000002 FENTANYL CITRATE (PF) 100 MCG/2ML SOLUTION: Performed by: NURSE ANESTHETIST, CERTIFIED REGISTERED

## 2023-12-13 PROCEDURE — 80048 BASIC METABOLIC PNL TOTAL CA: CPT | Performed by: ORTHOPAEDIC SURGERY

## 2023-12-13 PROCEDURE — 25010000002 DEXAMETHASONE PER 1 MG: Performed by: NURSE ANESTHETIST, CERTIFIED REGISTERED

## 2023-12-13 PROCEDURE — 25010000002 PROPOFOL 10 MG/ML EMULSION: Performed by: NURSE ANESTHETIST, CERTIFIED REGISTERED

## 2023-12-13 PROCEDURE — 85027 COMPLETE CBC AUTOMATED: CPT | Performed by: ORTHOPAEDIC SURGERY

## 2023-12-13 PROCEDURE — 25810000003 LACTATED RINGERS PER 1000 ML: Performed by: ORTHOPAEDIC SURGERY

## 2023-12-13 PROCEDURE — 86850 RBC ANTIBODY SCREEN: CPT | Performed by: ORTHOPAEDIC SURGERY

## 2023-12-13 PROCEDURE — 86901 BLOOD TYPING SEROLOGIC RH(D): CPT | Performed by: ORTHOPAEDIC SURGERY

## 2023-12-13 DEVICE — KT HEMOST ABS SURGIFOAM PORCN 1GRAM: Type: IMPLANTABLE DEVICE | Site: SPINE LUMBAR | Status: FUNCTIONAL

## 2023-12-13 RX ORDER — PROPOFOL 10 MG/ML
VIAL (ML) INTRAVENOUS AS NEEDED
Status: DISCONTINUED | OUTPATIENT
Start: 2023-12-13 | End: 2023-12-13 | Stop reason: SURG

## 2023-12-13 RX ORDER — LABETALOL HYDROCHLORIDE 5 MG/ML
5 INJECTION, SOLUTION INTRAVENOUS
Status: DISCONTINUED | OUTPATIENT
Start: 2023-12-13 | End: 2023-12-13 | Stop reason: HOSPADM

## 2023-12-13 RX ORDER — CITALOPRAM 40 MG/1
40 TABLET ORAL DAILY
COMMUNITY

## 2023-12-13 RX ORDER — OXYCODONE HCL 20 MG/1
20 TABLET, FILM COATED, EXTENDED RELEASE ORAL ONCE
Status: COMPLETED | OUTPATIENT
Start: 2023-12-13 | End: 2023-12-13

## 2023-12-13 RX ORDER — DROPERIDOL 2.5 MG/ML
0.62 INJECTION, SOLUTION INTRAMUSCULAR; INTRAVENOUS ONCE AS NEEDED
Status: DISCONTINUED | OUTPATIENT
Start: 2023-12-13 | End: 2023-12-13 | Stop reason: HOSPADM

## 2023-12-13 RX ORDER — OXYCODONE AND ACETAMINOPHEN 10; 325 MG/1; MG/1
1 TABLET ORAL ONCE AS NEEDED
Status: COMPLETED | OUTPATIENT
Start: 2023-12-13 | End: 2023-12-13

## 2023-12-13 RX ORDER — EPHEDRINE SULFATE 50 MG/ML
INJECTION, SOLUTION INTRAVENOUS AS NEEDED
Status: DISCONTINUED | OUTPATIENT
Start: 2023-12-13 | End: 2023-12-13 | Stop reason: SURG

## 2023-12-13 RX ORDER — ONDANSETRON 2 MG/ML
INJECTION INTRAMUSCULAR; INTRAVENOUS AS NEEDED
Status: DISCONTINUED | OUTPATIENT
Start: 2023-12-13 | End: 2023-12-13 | Stop reason: SURG

## 2023-12-13 RX ORDER — SODIUM CHLORIDE 0.9 % (FLUSH) 0.9 %
10 SYRINGE (ML) INJECTION AS NEEDED
Status: DISCONTINUED | OUTPATIENT
Start: 2023-12-13 | End: 2023-12-13 | Stop reason: HOSPADM

## 2023-12-13 RX ORDER — SUCCINYLCHOLINE/SOD CL,ISO/PF 200MG/10ML
SYRINGE (ML) INTRAVENOUS AS NEEDED
Status: DISCONTINUED | OUTPATIENT
Start: 2023-12-13 | End: 2023-12-13 | Stop reason: SURG

## 2023-12-13 RX ORDER — SODIUM CHLORIDE, SODIUM LACTATE, POTASSIUM CHLORIDE, CALCIUM CHLORIDE 600; 310; 30; 20 MG/100ML; MG/100ML; MG/100ML; MG/100ML
100 INJECTION, SOLUTION INTRAVENOUS CONTINUOUS
Status: DISCONTINUED | OUTPATIENT
Start: 2023-12-13 | End: 2023-12-13 | Stop reason: HOSPADM

## 2023-12-13 RX ORDER — FLUMAZENIL 0.1 MG/ML
0.2 INJECTION INTRAVENOUS AS NEEDED
Status: DISCONTINUED | OUTPATIENT
Start: 2023-12-13 | End: 2023-12-13 | Stop reason: HOSPADM

## 2023-12-13 RX ORDER — ROCURONIUM BROMIDE 10 MG/ML
INJECTION, SOLUTION INTRAVENOUS AS NEEDED
Status: DISCONTINUED | OUTPATIENT
Start: 2023-12-13 | End: 2023-12-13 | Stop reason: SURG

## 2023-12-13 RX ORDER — NALOXONE HCL 0.4 MG/ML
0.04 VIAL (ML) INJECTION AS NEEDED
Status: DISCONTINUED | OUTPATIENT
Start: 2023-12-13 | End: 2023-12-13 | Stop reason: HOSPADM

## 2023-12-13 RX ORDER — DEXAMETHASONE SODIUM PHOSPHATE 4 MG/ML
INJECTION, SOLUTION INTRA-ARTICULAR; INTRALESIONAL; INTRAMUSCULAR; INTRAVENOUS; SOFT TISSUE AS NEEDED
Status: DISCONTINUED | OUTPATIENT
Start: 2023-12-13 | End: 2023-12-13 | Stop reason: SURG

## 2023-12-13 RX ORDER — IBUPROFEN 600 MG/1
600 TABLET ORAL ONCE AS NEEDED
Status: DISCONTINUED | OUTPATIENT
Start: 2023-12-13 | End: 2023-12-13 | Stop reason: HOSPADM

## 2023-12-13 RX ORDER — SODIUM CHLORIDE 0.9 % (FLUSH) 0.9 %
3-10 SYRINGE (ML) INJECTION AS NEEDED
Status: DISCONTINUED | OUTPATIENT
Start: 2023-12-13 | End: 2023-12-13 | Stop reason: HOSPADM

## 2023-12-13 RX ORDER — ACETAMINOPHEN 500 MG
1000 TABLET ORAL ONCE
Status: COMPLETED | OUTPATIENT
Start: 2023-12-13 | End: 2023-12-13

## 2023-12-13 RX ORDER — SODIUM CHLORIDE 9 MG/ML
40 INJECTION, SOLUTION INTRAVENOUS AS NEEDED
Status: DISCONTINUED | OUTPATIENT
Start: 2023-12-13 | End: 2023-12-13 | Stop reason: HOSPADM

## 2023-12-13 RX ORDER — CLINDAMYCIN HYDROCHLORIDE 300 MG/1
300 CAPSULE ORAL 3 TIMES DAILY
Qty: 15 CAPSULE | Refills: 0 | Status: SHIPPED | OUTPATIENT
Start: 2023-12-13 | End: 2023-12-18

## 2023-12-13 RX ORDER — HYDROMORPHONE HYDROCHLORIDE 1 MG/ML
0.5 INJECTION, SOLUTION INTRAMUSCULAR; INTRAVENOUS; SUBCUTANEOUS
Status: DISCONTINUED | OUTPATIENT
Start: 2023-12-13 | End: 2023-12-13 | Stop reason: HOSPADM

## 2023-12-13 RX ORDER — FENTANYL CITRATE 50 UG/ML
25 INJECTION, SOLUTION INTRAMUSCULAR; INTRAVENOUS
Status: DISCONTINUED | OUTPATIENT
Start: 2023-12-13 | End: 2023-12-13 | Stop reason: HOSPADM

## 2023-12-13 RX ORDER — SODIUM CHLORIDE, SODIUM LACTATE, POTASSIUM CHLORIDE, CALCIUM CHLORIDE 600; 310; 30; 20 MG/100ML; MG/100ML; MG/100ML; MG/100ML
100 INJECTION, SOLUTION INTRAVENOUS CONTINUOUS PRN
Status: DISCONTINUED | OUTPATIENT
Start: 2023-12-13 | End: 2023-12-13 | Stop reason: HOSPADM

## 2023-12-13 RX ORDER — SODIUM CHLORIDE 0.9 % (FLUSH) 0.9 %
3 SYRINGE (ML) INJECTION EVERY 12 HOURS SCHEDULED
Status: DISCONTINUED | OUTPATIENT
Start: 2023-12-13 | End: 2023-12-13 | Stop reason: HOSPADM

## 2023-12-13 RX ORDER — MIDAZOLAM HYDROCHLORIDE 1 MG/ML
1 INJECTION INTRAMUSCULAR; INTRAVENOUS
Status: DISCONTINUED | OUTPATIENT
Start: 2023-12-13 | End: 2023-12-13 | Stop reason: HOSPADM

## 2023-12-13 RX ORDER — LIDOCAINE HYDROCHLORIDE 20 MG/ML
INJECTION, SOLUTION EPIDURAL; INFILTRATION; INTRACAUDAL; PERINEURAL AS NEEDED
Status: DISCONTINUED | OUTPATIENT
Start: 2023-12-13 | End: 2023-12-13 | Stop reason: SURG

## 2023-12-13 RX ORDER — SODIUM CHLORIDE 0.9 % (FLUSH) 0.9 %
10 SYRINGE (ML) INJECTION EVERY 12 HOURS SCHEDULED
Status: DISCONTINUED | OUTPATIENT
Start: 2023-12-13 | End: 2023-12-13 | Stop reason: HOSPADM

## 2023-12-13 RX ORDER — MAGNESIUM HYDROXIDE 1200 MG/15ML
LIQUID ORAL AS NEEDED
Status: DISCONTINUED | OUTPATIENT
Start: 2023-12-13 | End: 2023-12-13 | Stop reason: HOSPADM

## 2023-12-13 RX ORDER — ONDANSETRON 2 MG/ML
4 INJECTION INTRAMUSCULAR; INTRAVENOUS
Status: DISCONTINUED | OUTPATIENT
Start: 2023-12-13 | End: 2023-12-13 | Stop reason: HOSPADM

## 2023-12-13 RX ORDER — LIDOCAINE HYDROCHLORIDE 10 MG/ML
0.5 INJECTION, SOLUTION EPIDURAL; INFILTRATION; INTRACAUDAL; PERINEURAL ONCE AS NEEDED
Status: DISCONTINUED | OUTPATIENT
Start: 2023-12-13 | End: 2023-12-13 | Stop reason: HOSPADM

## 2023-12-13 RX ORDER — FENTANYL CITRATE 50 UG/ML
INJECTION, SOLUTION INTRAMUSCULAR; INTRAVENOUS AS NEEDED
Status: DISCONTINUED | OUTPATIENT
Start: 2023-12-13 | End: 2023-12-13 | Stop reason: SURG

## 2023-12-13 RX ADMIN — FENTANYL CITRATE 25 MCG: 50 INJECTION, SOLUTION INTRAMUSCULAR; INTRAVENOUS at 17:50

## 2023-12-13 RX ADMIN — FENTANYL CITRATE 25 MCG: 50 INJECTION, SOLUTION INTRAMUSCULAR; INTRAVENOUS at 17:30

## 2023-12-13 RX ADMIN — HYDROMORPHONE HYDROCHLORIDE 0.5 MG: 1 INJECTION, SOLUTION INTRAMUSCULAR; INTRAVENOUS; SUBCUTANEOUS at 17:23

## 2023-12-13 RX ADMIN — PROPOFOL 100 MG: 10 INJECTION, EMULSION INTRAVENOUS at 17:01

## 2023-12-13 RX ADMIN — EPHEDRINE SULFATE 20 MG: 50 INJECTION INTRAVENOUS at 15:37

## 2023-12-13 RX ADMIN — FENTANYL CITRATE 100 MCG: 50 INJECTION, SOLUTION INTRAMUSCULAR; INTRAVENOUS at 16:50

## 2023-12-13 RX ADMIN — ONDANSETRON 4 MG: 2 INJECTION INTRAMUSCULAR; INTRAVENOUS at 15:40

## 2023-12-13 RX ADMIN — ACETAMINOPHEN 1000 MG: 500 TABLET ORAL at 14:41

## 2023-12-13 RX ADMIN — OXYCODONE HYDROCHLORIDE 20 MG: 20 TABLET, FILM COATED, EXTENDED RELEASE ORAL at 09:45

## 2023-12-13 RX ADMIN — DEXAMETHASONE SODIUM PHOSPHATE 4 MG: 4 INJECTION, SOLUTION INTRA-ARTICULAR; INTRALESIONAL; INTRAMUSCULAR; INTRAVENOUS; SOFT TISSUE at 15:40

## 2023-12-13 RX ADMIN — LIDOCAINE HYDROCHLORIDE 100 MG: 20 INJECTION, SOLUTION EPIDURAL; INFILTRATION; INTRACAUDAL; PERINEURAL at 15:16

## 2023-12-13 RX ADMIN — SODIUM CHLORIDE, POTASSIUM CHLORIDE, SODIUM LACTATE AND CALCIUM CHLORIDE 100 ML/HR: 600; 310; 30; 20 INJECTION, SOLUTION INTRAVENOUS at 09:45

## 2023-12-13 RX ADMIN — OXYCODONE HYDROCHLORIDE AND ACETAMINOPHEN 1 TABLET: 10; 325 TABLET ORAL at 17:30

## 2023-12-13 RX ADMIN — FENTANYL CITRATE 25 MCG: 50 INJECTION, SOLUTION INTRAMUSCULAR; INTRAVENOUS at 17:25

## 2023-12-13 RX ADMIN — FENTANYL CITRATE 100 MCG: 50 INJECTION, SOLUTION INTRAMUSCULAR; INTRAVENOUS at 15:16

## 2023-12-13 RX ADMIN — PROPOFOL 180 MG: 10 INJECTION, EMULSION INTRAVENOUS at 15:16

## 2023-12-13 RX ADMIN — CEFAZOLIN 2000 MG: 2 INJECTION, POWDER, FOR SOLUTION INTRAMUSCULAR; INTRAVENOUS at 15:18

## 2023-12-13 RX ADMIN — ROCURONIUM BROMIDE 5 MG: 10 SOLUTION INTRAVENOUS at 15:16

## 2023-12-13 RX ADMIN — Medication 120 MG: at 15:16

## 2023-12-13 RX ADMIN — HYDROMORPHONE HYDROCHLORIDE 0.5 MG: 1 INJECTION, SOLUTION INTRAMUSCULAR; INTRAVENOUS; SUBCUTANEOUS at 17:36

## 2023-12-13 NOTE — NURSING NOTE
Patient arrived to surgery holding and at this time noted potassium level at 2.6. Will update both the surgeon and the Anesthesia MD

## 2023-12-13 NOTE — ANESTHESIA PREPROCEDURE EVALUATION
Anesthesia Evaluation     Patient summary reviewed   no history of anesthetic complications:   NPO Solid Status: > 8 hours  NPO Liquid Status: > 8 hours           Airway   Mallampati: II  TM distance: >3 FB  No difficulty expected  Dental - normal exam     Pulmonary    (+) a smoker Former, asthma,sleep apnea (could not tolerate CPAP)  Cardiovascular   Exercise tolerance: excellent (>7 METS)    ECG reviewed    (+) hypertension      Neuro/Psych  GI/Hepatic/Renal/Endo    (+) GERD  (-) liver disease, no renal disease, diabetes    ROS Comment: S/p gastric sleeve    Musculoskeletal     Abdominal    Substance History      OB/GYN          Other                        Anesthesia Plan    ASA 2     general     (Hypokalemic )  intravenous induction     Anesthetic plan, risks, benefits, and alternatives have been provided, discussed and informed consent has been obtained with: patient.      CODE STATUS:

## 2023-12-13 NOTE — ANESTHESIA PROCEDURE NOTES
Airway  Date/Time: 12/13/2023 3:16 PM    General Information and Staff    CRNA/CAA: Prieto Sanches CRNA    Indications and Patient Condition  Indications for airway management: airway protection    Preoxygenated: yes  Mask difficulty assessment: 1 - vent by mask    Final Airway Details  Final airway type: endotracheal airway      Successful airway: ETT     Successful intubation technique: direct laryngoscopy  Endotracheal tube insertion site: oral  Blade: Fernández  Blade size: 2 (3.5)  ETT size (mm): 7.5  Placement verified by: chest auscultation and capnometry   Measured from: gums  ETT/EBT to gums (cm): 22  Assessment: lips, teeth, and gum same as pre-op and atraumatic intubation

## 2023-12-13 NOTE — OP NOTE
INCISION AND DRAINAGE POSTERIOR SPINE LUMBAR/SACRAL  Procedure Note    Kurtis Valladares  12/13/2023    Pre-op Diagnosis:     s/p hemilaminectomy, partial facetectomy, foraminotomy decompression bilateral L3-4, left L5-S1, 11/13/23  2.  Residual low back pain  3.  Posterior lumbar wound dehiscence with drainage    Post-op Diagnosis:    same    Procedure/CPT® Codes:    1.  Excisional debridement posterior lumbar wound dehiscence  2.  Revision posterior lumbar wound closure (approximately 25 cm    Anesthesia: General    Surgeon: FILIBERTO Iyer MD    Assistant: Mickey Crowley PA-C    Estimated Blood Loss: Minimal    Complications: None    Condition: Stable to PACU.    Indications:    The patient is a 58-year-old who sees Shira Rushing nurse practitioner for medical issues.  He underwent a hemilaminectomy foraminotomy decompression bilaterally at L3-4 and on the left at L5-S1 on 11/13/2023.  Unfortunately he developed a nonhealing posterior lumbar wound with continued serous drainage.  After the drainage failed to improve, it was mutually decided that returning to the operating room for a excisional debridement and complex revision wound closure would be his best option.  Risks, benefits, and complications were discussed with the patient.  He appeared well-informed and wished to proceed.    Operative Procedure:    After obtaining informed consent and verifying the correct operative site, the patient was brought to the operating room. A general anesthetic was provided by the anesthesia service with the assistance of an endotracheal tube. Once this was appropriately positioned and secured, the patient was carefully rotated prone onto a Jose frame. All bony processes were well-padded. The lumbar region was prepped and draped in usual sterile fashion. A surgical timeout was taken to confirm this was the correct patient, we were working at the correct level, and that preoperative antibiotics were given in a timely  fashion.    The posterior lumbar incision was widened with 3 separate open areas.  The incision itself was widened and atrophic approximately a centimeter wide at its greatest distance.  The open area had serous drainage easily expressed.  No obvious purulence was identified.  There was really no erythema.    Using a marking pen, I mapped out an ellipse to completely excise the widened and dehisced posterior lumbar incision.  I used a 10 blade scalpel to create this ellipse and excise not only the skin but the deep subcutaneous tissues down to approximate level of fascia in order to excise all of the draining area.  Again I did not see any purulence or signs of infection.  The fascia overall was intact.  I then used a pulsatile lavage to thoroughly irrigate the entire wound.  Bovie cautery was used to obtain hemostasis.    Wound closure was then accomplished by reapproximating the deep tissues and fascia with a #0 PDS stitch.  Immediate subcutaneous tissues were closed using a 2-0 PDS stitch and skin closure was accomplished using a running 2-0 nylon stitch.  The incision was then washed and sterilely dressed with a Bioclusive dressing.    The patient was then carefully [place] supine [place], extubated, and sent to the recovery room in good stable condition.  The patient tolerated the procedure well.  There were no complications.  We estimated blood loss to be minimal.    Mickey Crowley PA-C provided critical assistance during the procedure.  His assistance was medically necessary in order to allow the procedure to occur in the most safe and efficient manner.    FILIBERTO Iyer MD     Date: 12/13/2023  Time: 17:21 CST

## 2023-12-14 NOTE — ANESTHESIA POSTPROCEDURE EVALUATION
"Patient: Kurtis Valladares    Procedure Summary       Date: 12/13/23 Room / Location: [facility] OR  /  [facility] OR    Anesthesia Start: 1512 Anesthesia Stop: 1709    Procedure: IRRIGATION AND DEBRIDEMENT POSTERIOR LUMBAR WOUND (Spine Lumbar) Diagnosis: (M54.5)    Surgeons: PAWEL Iyer MD Provider: William Gar CRNA    Anesthesia Type: general ASA Status: 2            Anesthesia Type: general    Vitals  Vitals Value Taken Time   /84 12/13/23 1834   Temp 98 °F (36.7 °C) 12/13/23 1825   Pulse 69 12/13/23 1835   Resp 16 12/13/23 1825   SpO2 100 % 12/13/23 1835   Vitals shown include unfiled device data.        Post Anesthesia Care and Evaluation    Patient location during evaluation: PACU  Patient participation: complete - patient participated  Level of consciousness: awake and alert  Pain management: adequate    Airway patency: patent  Anesthetic complications: No anesthetic complications    Cardiovascular status: acceptable  Respiratory status: acceptable  Hydration status: acceptable    Comments: Blood pressure 108/72, pulse 72, temperature 98 °F (36.7 °C), resp. rate 16, height 183 cm (72.05\"), weight 111 kg (245 lb 9.5 oz), SpO2 98%.    Pt discharged from PACU based on nettie score >8    "

## 2024-02-16 ENCOUNTER — HOSPITAL ENCOUNTER (OUTPATIENT)
Facility: HOSPITAL | Age: 59
Setting detail: SURGERY ADMIT
Discharge: HOME | End: 2024-02-16
Attending: ORTHOPAEDIC SURGERY | Admitting: ORTHOPAEDIC SURGERY
Payer: MEDICAID

## 2024-02-16 ENCOUNTER — ANESTHESIA EVENT (OUTPATIENT)
Dept: PERIOP | Facility: HOSPITAL | Age: 59
End: 2024-02-16
Payer: MEDICAID

## 2024-02-16 ENCOUNTER — ANESTHESIA (OUTPATIENT)
Dept: PERIOP | Facility: HOSPITAL | Age: 59
End: 2024-02-16
Payer: MEDICAID

## 2024-02-16 VITALS
WEIGHT: 244.05 LBS | RESPIRATION RATE: 18 BRPM | BODY MASS INDEX: 32.34 KG/M2 | HEIGHT: 73 IN | OXYGEN SATURATION: 98 % | TEMPERATURE: 97.6 F | HEART RATE: 65 BPM | DIASTOLIC BLOOD PRESSURE: 100 MMHG | SYSTOLIC BLOOD PRESSURE: 125 MMHG

## 2024-02-16 DIAGNOSIS — T81.49XA POST-OPERATIVE WOUND ABSCESS: ICD-10-CM

## 2024-02-16 LAB
ABO GROUP BLD: NORMAL
ALBUMIN SERPL-MCNC: 3.9 G/DL (ref 3.5–5.2)
ALBUMIN/GLOB SERPL: 1.3 G/DL
ALP SERPL-CCNC: 92 U/L (ref 39–117)
ALT SERPL W P-5'-P-CCNC: 13 U/L (ref 1–41)
ANION GAP SERPL CALCULATED.3IONS-SCNC: 7 MMOL/L (ref 5–15)
APTT PPP: 26.6 SECONDS (ref 24.5–36)
AST SERPL-CCNC: 24 U/L (ref 1–40)
BACTERIA UR QL AUTO: NORMAL /HPF
BILIRUB SERPL-MCNC: 0.5 MG/DL (ref 0–1.2)
BILIRUB UR QL STRIP: NEGATIVE
BLD GP AB SCN SERPL QL: NEGATIVE
BUN SERPL-MCNC: 10 MG/DL (ref 6–20)
BUN/CREAT SERPL: 13.2 (ref 7–25)
CALCIUM SPEC-SCNC: 8.9 MG/DL (ref 8.6–10.5)
CHLORIDE SERPL-SCNC: 103 MMOL/L (ref 98–107)
CLARITY UR: ABNORMAL
CO2 SERPL-SCNC: 32 MMOL/L (ref 22–29)
COLOR UR: ABNORMAL
CREAT SERPL-MCNC: 0.76 MG/DL (ref 0.76–1.27)
DEPRECATED RDW RBC AUTO: 43.2 FL (ref 37–54)
EGFRCR SERPLBLD CKD-EPI 2021: 104.8 ML/MIN/1.73
ERYTHROCYTE [DISTWIDTH] IN BLOOD BY AUTOMATED COUNT: 14.1 % (ref 12.3–15.4)
GLOBULIN UR ELPH-MCNC: 3.1 GM/DL
GLUCOSE SERPL-MCNC: 101 MG/DL (ref 65–99)
GLUCOSE UR STRIP-MCNC: NEGATIVE MG/DL
HCT VFR BLD AUTO: 38.5 % (ref 37.5–51)
HGB BLD-MCNC: 12.7 G/DL (ref 13–17.7)
HGB UR QL STRIP.AUTO: NEGATIVE
HYALINE CASTS UR QL AUTO: NORMAL /LPF
INR PPP: 0.98 (ref 0.91–1.09)
KETONES UR QL STRIP: NEGATIVE
LEUKOCYTE ESTERASE UR QL STRIP.AUTO: ABNORMAL
MCH RBC QN AUTO: 27.9 PG (ref 26.6–33)
MCHC RBC AUTO-ENTMCNC: 33 G/DL (ref 31.5–35.7)
MCV RBC AUTO: 84.6 FL (ref 79–97)
NITRITE UR QL STRIP: NEGATIVE
PH UR STRIP.AUTO: 8.5 [PH] (ref 5–8)
PLATELET # BLD AUTO: 213 10*3/MM3 (ref 140–450)
PMV BLD AUTO: 9.6 FL (ref 6–12)
POTASSIUM SERPL-SCNC: 2.6 MMOL/L (ref 3.5–5.2)
PROT SERPL-MCNC: 7 G/DL (ref 6–8.5)
PROT UR QL STRIP: ABNORMAL
PROTHROMBIN TIME: 13.1 SECONDS (ref 11.8–14.8)
RBC # BLD AUTO: 4.55 10*6/MM3 (ref 4.14–5.8)
RBC # UR STRIP: NORMAL /HPF
REF LAB TEST METHOD: NORMAL
RH BLD: NEGATIVE
SODIUM SERPL-SCNC: 142 MMOL/L (ref 136–145)
SP GR UR STRIP: 1.02 (ref 1–1.03)
SQUAMOUS #/AREA URNS HPF: NORMAL /HPF
T&S EXPIRATION DATE: NORMAL
UROBILINOGEN UR QL STRIP: ABNORMAL
WBC # UR STRIP: NORMAL /HPF
WBC NRBC COR # BLD AUTO: 7.3 10*3/MM3 (ref 3.4–10.8)

## 2024-02-16 PROCEDURE — 80053 COMPREHEN METABOLIC PANEL: CPT | Performed by: ORTHOPAEDIC SURGERY

## 2024-02-16 PROCEDURE — 86850 RBC ANTIBODY SCREEN: CPT | Performed by: ORTHOPAEDIC SURGERY

## 2024-02-16 PROCEDURE — 85610 PROTHROMBIN TIME: CPT | Performed by: ORTHOPAEDIC SURGERY

## 2024-02-16 PROCEDURE — 86901 BLOOD TYPING SEROLOGIC RH(D): CPT | Performed by: ORTHOPAEDIC SURGERY

## 2024-02-16 PROCEDURE — 25010000002 VANCOMYCIN 1 G RECONSTITUTED SOLUTION 1 EACH VIAL: Performed by: ORTHOPAEDIC SURGERY

## 2024-02-16 PROCEDURE — 25810000003 LACTATED RINGERS PER 1000 ML: Performed by: ORTHOPAEDIC SURGERY

## 2024-02-16 PROCEDURE — 87102 FUNGUS ISOLATION CULTURE: CPT | Performed by: ORTHOPAEDIC SURGERY

## 2024-02-16 PROCEDURE — 25010000002 HYDROMORPHONE PER 4 MG: Performed by: ANESTHESIOLOGY

## 2024-02-16 PROCEDURE — 25010000002 FENTANYL CITRATE (PF) 100 MCG/2ML SOLUTION: Performed by: NURSE ANESTHETIST, CERTIFIED REGISTERED

## 2024-02-16 PROCEDURE — 85027 COMPLETE CBC AUTOMATED: CPT | Performed by: ORTHOPAEDIC SURGERY

## 2024-02-16 PROCEDURE — 85730 THROMBOPLASTIN TIME PARTIAL: CPT | Performed by: ORTHOPAEDIC SURGERY

## 2024-02-16 PROCEDURE — 25010000002 GLYCOPYRROLATE 0.4 MG/2ML SOLUTION: Performed by: NURSE ANESTHETIST, CERTIFIED REGISTERED

## 2024-02-16 PROCEDURE — 25810000003 SODIUM CHLORIDE PER 500 ML: Performed by: ORTHOPAEDIC SURGERY

## 2024-02-16 PROCEDURE — 86900 BLOOD TYPING SEROLOGIC ABO: CPT | Performed by: ORTHOPAEDIC SURGERY

## 2024-02-16 PROCEDURE — 87070 CULTURE OTHR SPECIMN AEROBIC: CPT | Performed by: ORTHOPAEDIC SURGERY

## 2024-02-16 PROCEDURE — 81001 URINALYSIS AUTO W/SCOPE: CPT | Performed by: ORTHOPAEDIC SURGERY

## 2024-02-16 PROCEDURE — 87147 CULTURE TYPE IMMUNOLOGIC: CPT | Performed by: ORTHOPAEDIC SURGERY

## 2024-02-16 PROCEDURE — 25010000002 PROPOFOL 10 MG/ML EMULSION: Performed by: NURSE ANESTHETIST, CERTIFIED REGISTERED

## 2024-02-16 PROCEDURE — 87186 SC STD MICRODIL/AGAR DIL: CPT | Performed by: ORTHOPAEDIC SURGERY

## 2024-02-16 PROCEDURE — 87205 SMEAR GRAM STAIN: CPT | Performed by: ORTHOPAEDIC SURGERY

## 2024-02-16 PROCEDURE — 25010000002 SUGAMMADEX 200 MG/2ML SOLUTION: Performed by: NURSE ANESTHETIST, CERTIFIED REGISTERED

## 2024-02-16 PROCEDURE — 25010000002 FENTANYL CITRATE (PF) 50 MCG/ML SOLUTION: Performed by: ANESTHESIOLOGY

## 2024-02-16 PROCEDURE — 25010000002 DEXAMETHASONE PER 1 MG: Performed by: NURSE ANESTHETIST, CERTIFIED REGISTERED

## 2024-02-16 PROCEDURE — 25010000002 ONDANSETRON PER 1 MG: Performed by: NURSE ANESTHETIST, CERTIFIED REGISTERED

## 2024-02-16 RX ORDER — SODIUM CHLORIDE 9 MG/ML
40 INJECTION, SOLUTION INTRAVENOUS AS NEEDED
Status: DISCONTINUED | OUTPATIENT
Start: 2024-02-16 | End: 2024-02-16 | Stop reason: HOSPADM

## 2024-02-16 RX ORDER — OXYCODONE HCL 20 MG/1
20 TABLET, FILM COATED, EXTENDED RELEASE ORAL ONCE
Status: COMPLETED | OUTPATIENT
Start: 2024-02-16 | End: 2024-02-16

## 2024-02-16 RX ORDER — POTASSIUM CHLORIDE 20MEQ/15ML
20 LIQUID (ML) ORAL DAILY
Status: DISCONTINUED | OUTPATIENT
Start: 2024-02-16 | End: 2024-02-16 | Stop reason: SDUPTHER

## 2024-02-16 RX ORDER — HYDROMORPHONE HYDROCHLORIDE 1 MG/ML
0.5 INJECTION, SOLUTION INTRAMUSCULAR; INTRAVENOUS; SUBCUTANEOUS
Status: DISCONTINUED | OUTPATIENT
Start: 2024-02-16 | End: 2024-02-16 | Stop reason: HOSPADM

## 2024-02-16 RX ORDER — LIDOCAINE HYDROCHLORIDE 20 MG/ML
INJECTION, SOLUTION EPIDURAL; INFILTRATION; INTRACAUDAL; PERINEURAL AS NEEDED
Status: DISCONTINUED | OUTPATIENT
Start: 2024-02-16 | End: 2024-02-16 | Stop reason: SURG

## 2024-02-16 RX ORDER — FLUMAZENIL 0.1 MG/ML
0.2 INJECTION INTRAVENOUS AS NEEDED
Status: DISCONTINUED | OUTPATIENT
Start: 2024-02-16 | End: 2024-02-16 | Stop reason: HOSPADM

## 2024-02-16 RX ORDER — SODIUM CHLORIDE 0.9 % (FLUSH) 0.9 %
3 SYRINGE (ML) INJECTION EVERY 12 HOURS SCHEDULED
Status: DISCONTINUED | OUTPATIENT
Start: 2024-02-16 | End: 2024-02-16 | Stop reason: HOSPADM

## 2024-02-16 RX ORDER — DROPERIDOL 2.5 MG/ML
0.62 INJECTION, SOLUTION INTRAMUSCULAR; INTRAVENOUS ONCE AS NEEDED
Status: DISCONTINUED | OUTPATIENT
Start: 2024-02-16 | End: 2024-02-16 | Stop reason: HOSPADM

## 2024-02-16 RX ORDER — DEXAMETHASONE SODIUM PHOSPHATE 4 MG/ML
INJECTION, SOLUTION INTRA-ARTICULAR; INTRALESIONAL; INTRAMUSCULAR; INTRAVENOUS; SOFT TISSUE AS NEEDED
Status: DISCONTINUED | OUTPATIENT
Start: 2024-02-16 | End: 2024-02-16 | Stop reason: SURG

## 2024-02-16 RX ORDER — POTASSIUM CHLORIDE 20MEQ/15ML
20 LIQUID (ML) ORAL DAILY
Status: DISCONTINUED | OUTPATIENT
Start: 2024-02-16 | End: 2024-02-16 | Stop reason: HOSPADM

## 2024-02-16 RX ORDER — IBUPROFEN 600 MG/1
600 TABLET ORAL ONCE AS NEEDED
Status: DISCONTINUED | OUTPATIENT
Start: 2024-02-16 | End: 2024-02-16 | Stop reason: HOSPADM

## 2024-02-16 RX ORDER — LABETALOL HYDROCHLORIDE 5 MG/ML
5 INJECTION, SOLUTION INTRAVENOUS
Status: DISCONTINUED | OUTPATIENT
Start: 2024-02-16 | End: 2024-02-16 | Stop reason: HOSPADM

## 2024-02-16 RX ORDER — OXYCODONE AND ACETAMINOPHEN 10; 325 MG/1; MG/1
1 TABLET ORAL ONCE AS NEEDED
Status: COMPLETED | OUTPATIENT
Start: 2024-02-16 | End: 2024-02-16

## 2024-02-16 RX ORDER — ROCURONIUM BROMIDE 10 MG/ML
INJECTION, SOLUTION INTRAVENOUS AS NEEDED
Status: DISCONTINUED | OUTPATIENT
Start: 2024-02-16 | End: 2024-02-16 | Stop reason: SURG

## 2024-02-16 RX ORDER — SODIUM CHLORIDE, SODIUM LACTATE, POTASSIUM CHLORIDE, CALCIUM CHLORIDE 600; 310; 30; 20 MG/100ML; MG/100ML; MG/100ML; MG/100ML
100 INJECTION, SOLUTION INTRAVENOUS CONTINUOUS
Status: DISCONTINUED | OUTPATIENT
Start: 2024-02-16 | End: 2024-02-16 | Stop reason: HOSPADM

## 2024-02-16 RX ORDER — KETAMINE HCL IN NACL, ISO-OSM 100MG/10ML
SYRINGE (ML) INJECTION AS NEEDED
Status: DISCONTINUED | OUTPATIENT
Start: 2024-02-16 | End: 2024-02-16 | Stop reason: SURG

## 2024-02-16 RX ORDER — NALOXONE HCL 0.4 MG/ML
0.04 VIAL (ML) INJECTION AS NEEDED
Status: DISCONTINUED | OUTPATIENT
Start: 2024-02-16 | End: 2024-02-16 | Stop reason: HOSPADM

## 2024-02-16 RX ORDER — MIDAZOLAM HYDROCHLORIDE 1 MG/ML
1 INJECTION INTRAMUSCULAR; INTRAVENOUS
Status: DISCONTINUED | OUTPATIENT
Start: 2024-02-16 | End: 2024-02-16 | Stop reason: HOSPADM

## 2024-02-16 RX ORDER — CEPHALEXIN 500 MG/1
500 CAPSULE ORAL 4 TIMES DAILY
Qty: 28 CAPSULE | Refills: 0 | Status: SHIPPED | OUTPATIENT
Start: 2024-02-16 | End: 2024-02-23

## 2024-02-16 RX ORDER — SODIUM CHLORIDE 0.9 % (FLUSH) 0.9 %
3-10 SYRINGE (ML) INJECTION AS NEEDED
Status: DISCONTINUED | OUTPATIENT
Start: 2024-02-16 | End: 2024-02-16 | Stop reason: HOSPADM

## 2024-02-16 RX ORDER — SODIUM CHLORIDE, SODIUM LACTATE, POTASSIUM CHLORIDE, CALCIUM CHLORIDE 600; 310; 30; 20 MG/100ML; MG/100ML; MG/100ML; MG/100ML
100 INJECTION, SOLUTION INTRAVENOUS CONTINUOUS PRN
Status: DISCONTINUED | OUTPATIENT
Start: 2024-02-16 | End: 2024-02-16 | Stop reason: HOSPADM

## 2024-02-16 RX ORDER — SODIUM CHLORIDE 0.9 % (FLUSH) 0.9 %
10 SYRINGE (ML) INJECTION AS NEEDED
Status: DISCONTINUED | OUTPATIENT
Start: 2024-02-16 | End: 2024-02-16 | Stop reason: HOSPADM

## 2024-02-16 RX ORDER — LIDOCAINE HYDROCHLORIDE 10 MG/ML
0.5 INJECTION, SOLUTION EPIDURAL; INFILTRATION; INTRACAUDAL; PERINEURAL ONCE AS NEEDED
Status: DISCONTINUED | OUTPATIENT
Start: 2024-02-16 | End: 2024-02-16 | Stop reason: HOSPADM

## 2024-02-16 RX ORDER — PROPOFOL 10 MG/ML
VIAL (ML) INTRAVENOUS AS NEEDED
Status: DISCONTINUED | OUTPATIENT
Start: 2024-02-16 | End: 2024-02-16 | Stop reason: SURG

## 2024-02-16 RX ORDER — ONDANSETRON 2 MG/ML
4 INJECTION INTRAMUSCULAR; INTRAVENOUS
Status: DISCONTINUED | OUTPATIENT
Start: 2024-02-16 | End: 2024-02-16 | Stop reason: HOSPADM

## 2024-02-16 RX ORDER — FENTANYL CITRATE 50 UG/ML
25 INJECTION, SOLUTION INTRAMUSCULAR; INTRAVENOUS
Status: COMPLETED | OUTPATIENT
Start: 2024-02-16 | End: 2024-02-16

## 2024-02-16 RX ORDER — EPHEDRINE SULFATE 50 MG/ML
INJECTION INTRAVENOUS AS NEEDED
Status: DISCONTINUED | OUTPATIENT
Start: 2024-02-16 | End: 2024-02-16 | Stop reason: SURG

## 2024-02-16 RX ORDER — FENTANYL CITRATE 50 UG/ML
INJECTION, SOLUTION INTRAMUSCULAR; INTRAVENOUS AS NEEDED
Status: DISCONTINUED | OUTPATIENT
Start: 2024-02-16 | End: 2024-02-16 | Stop reason: SURG

## 2024-02-16 RX ORDER — ONDANSETRON 2 MG/ML
INJECTION INTRAMUSCULAR; INTRAVENOUS AS NEEDED
Status: DISCONTINUED | OUTPATIENT
Start: 2024-02-16 | End: 2024-02-16 | Stop reason: SURG

## 2024-02-16 RX ORDER — ACETAMINOPHEN 500 MG
1000 TABLET ORAL ONCE
Status: DISCONTINUED | OUTPATIENT
Start: 2024-02-16 | End: 2024-02-16 | Stop reason: HOSPADM

## 2024-02-16 RX ORDER — SODIUM CHLORIDE 9 MG/ML
INJECTION, SOLUTION INTRAVENOUS AS NEEDED
Status: DISCONTINUED | OUTPATIENT
Start: 2024-02-16 | End: 2024-02-16 | Stop reason: HOSPADM

## 2024-02-16 RX ORDER — SODIUM CHLORIDE 0.9 % (FLUSH) 0.9 %
10 SYRINGE (ML) INJECTION EVERY 12 HOURS SCHEDULED
Status: DISCONTINUED | OUTPATIENT
Start: 2024-02-16 | End: 2024-02-16 | Stop reason: HOSPADM

## 2024-02-16 RX ADMIN — HYDROMORPHONE HYDROCHLORIDE 0.5 MG: 1 INJECTION, SOLUTION INTRAMUSCULAR; INTRAVENOUS; SUBCUTANEOUS at 16:14

## 2024-02-16 RX ADMIN — LIDOCAINE HYDROCHLORIDE 100 MG: 20 INJECTION, SOLUTION EPIDURAL; INFILTRATION; INTRACAUDAL; PERINEURAL at 13:57

## 2024-02-16 RX ADMIN — FENTANYL CITRATE 25 MCG: 50 INJECTION, SOLUTION INTRAMUSCULAR; INTRAVENOUS at 16:03

## 2024-02-16 RX ADMIN — POTASSIUM CHLORIDE 20 MEQ: 20 SOLUTION ORAL at 16:05

## 2024-02-16 RX ADMIN — EPHEDRINE SULFATE 10 MG: 50 INJECTION INTRAVENOUS at 14:17

## 2024-02-16 RX ADMIN — ROCURONIUM BROMIDE 30 MG: 10 SOLUTION INTRAVENOUS at 13:57

## 2024-02-16 RX ADMIN — PROPOFOL 200 MG: 10 INJECTION, EMULSION INTRAVENOUS at 13:57

## 2024-02-16 RX ADMIN — ONDANSETRON 4 MG: 2 INJECTION INTRAMUSCULAR; INTRAVENOUS at 15:12

## 2024-02-16 RX ADMIN — SODIUM CHLORIDE, POTASSIUM CHLORIDE, SODIUM LACTATE AND CALCIUM CHLORIDE 100 ML/HR: 600; 310; 30; 20 INJECTION, SOLUTION INTRAVENOUS at 09:46

## 2024-02-16 RX ADMIN — Medication 50 MG: at 14:27

## 2024-02-16 RX ADMIN — GLYCOPYRROLATE 0.2 MG: 0.2 INJECTION INTRAMUSCULAR; INTRAVENOUS at 14:21

## 2024-02-16 RX ADMIN — OXYCODONE HYDROCHLORIDE 20 MG: 20 TABLET, FILM COATED, EXTENDED RELEASE ORAL at 09:46

## 2024-02-16 RX ADMIN — EPHEDRINE SULFATE 10 MG: 50 INJECTION INTRAVENOUS at 14:54

## 2024-02-16 RX ADMIN — OXYCODONE HYDROCHLORIDE AND ACETAMINOPHEN 1 TABLET: 10; 325 TABLET ORAL at 16:11

## 2024-02-16 RX ADMIN — FENTANYL CITRATE 25 MCG: 50 INJECTION, SOLUTION INTRAMUSCULAR; INTRAVENOUS at 16:47

## 2024-02-16 RX ADMIN — EPHEDRINE SULFATE 10 MG: 50 INJECTION INTRAVENOUS at 14:40

## 2024-02-16 RX ADMIN — HYDROMORPHONE HYDROCHLORIDE 0.5 MG: 1 INJECTION, SOLUTION INTRAMUSCULAR; INTRAVENOUS; SUBCUTANEOUS at 16:40

## 2024-02-16 RX ADMIN — SUGAMMADEX 200 MG: 100 INJECTION, SOLUTION INTRAVENOUS at 15:40

## 2024-02-16 RX ADMIN — DEXAMETHASONE SODIUM PHOSPHATE 4 MG: 4 INJECTION, SOLUTION INTRAMUSCULAR; INTRAVENOUS at 14:08

## 2024-02-16 RX ADMIN — FENTANYL CITRATE 100 MCG: 50 INJECTION, SOLUTION INTRAMUSCULAR; INTRAVENOUS at 13:54

## 2024-02-16 RX ADMIN — FENTANYL CITRATE 25 MCG: 50 INJECTION, SOLUTION INTRAMUSCULAR; INTRAVENOUS at 16:42

## 2024-02-16 RX ADMIN — SODIUM CHLORIDE, POTASSIUM CHLORIDE, SODIUM LACTATE AND CALCIUM CHLORIDE: 600; 310; 30; 20 INJECTION, SOLUTION INTRAVENOUS at 14:18

## 2024-02-16 RX ADMIN — ROCURONIUM BROMIDE 20 MG: 10 SOLUTION INTRAVENOUS at 14:27

## 2024-02-16 RX ADMIN — FENTANYL CITRATE 25 MCG: 50 INJECTION, SOLUTION INTRAMUSCULAR; INTRAVENOUS at 16:08

## 2024-02-16 RX ADMIN — HYDROMORPHONE HYDROCHLORIDE 0.5 MG: 1 INJECTION, SOLUTION INTRAMUSCULAR; INTRAVENOUS; SUBCUTANEOUS at 16:01

## 2024-02-16 NOTE — OP NOTE
INCISION AND DRAINAGE POSTERIOR SPINE LUMBAR/SACRAL  Procedure Note    Kurtis Valladares  2/16/2024    Pre-op Diagnosis:     1.  Status post Chirag laminectomy decompression bilateral L3-4, left L5-S1, 11/13/2023  2.  Status post irrigation and debridement posterior lumbar wound, 12/13/2023  3.  Recurrent posterior lumbar wound drainage    Post-op Diagnosis:    same    Procedure/CPT® Codes:    1.  Irrigation and debridement posterior lumbar wound  2.  Revision posterior lumbar wound closure (6 cm)    Anesthesia: General    Surgeon: FILIBERTO Iyer MD    Assistant: Mickey Crowley PA-C    Estimated Blood Loss: 25 mL    Complications: None    Condition: Stable to PACU.    Indications:    The patient is a 58-year-old who sees Shira Rushing nurse practitioner for medical issues.  He originally presented to the office with low back pain along with left buttock, thigh, and leg radiculopathy as well as symptoms consistent with neurogenic claudication.  He was noted to have severe central and foraminal stenosis from L3-S1 that was worse centrally at L3-4 and foraminally on the left at L5-S1.  After failing attempted conservative measures he was taken to surgery for a decompression procedure bilaterally at L3-4 and on the left at L5-S1.  Unfortunately the patient did require an irrigation and debridement on 12/13/2023.  He did well until recently when the superior aspect of his wound began to swell and once again began draining.    Plans were made to return to the operating room for a repeat irrigation and debridement with wound closure.  Risks, benefits, and complications of surgery were reviewed with the patient.  He appeared well-informed and wished to proceed.    Operative Procedure:    After obtaining informed consent and verifying the correct operative site, the patient was brought to the operating room. A general anesthetic was provided by the anesthesia service with the assistance of an endotracheal tube. Once  this was appropriately positioned and secured, the patient was carefully rotated prone onto a Jose frame. All bony processes were well-padded. The lumbar region was prepped and draped in usual sterile fashion. A surgical timeout was taken to confirm this was the correct patient, we were working at the correct level, and that preoperative antibiotics were given in a timely fashion.    The open area on the posterior lumbar incision at its superiormost extent measured approximately 2 x 2 cm.  It was swelled and hypertrophic with some mild light purulent material noted.  I incised this area and took some cultures.  I then created a large ellipse with a 10 blade scalpel completely excising the hypertrophic open area along with a healthy margin for good wound closure.  This piece of skin was excised full-thickness into the subcutaneous layer.  Bleeding was stopped using Bovie cautery.  There was a small track noted down to the spinous process and a previous area of hemilaminotomy.  This area was probed and debrided with curettes.  I did not see any purulent material on the deep aspect of the wound.  I then used pulsatile lavage to thoroughly irrigate the wound as much as possible.    Once the wound was adequately debrided including the sinus track, hemostasis was obtained.  I felt the wound was appropriate for revision closure.    Wound closure was then accomplished by reapproximating the deep tissues and fascia with a #0 PDS stitch.  Immediate subcutaneous tissues were closed using a 2-0 PDS stitch and skin closure was accomplished using a running 2-0 nylon stitch.  The incisions were then washed and sterilely dressed with a Bioclusive dressing.    The patient was then carefully rotated supine onto a hospital [facility], extubated, and sent to the recovery room in good stable condition.  The patient tolerated the procedure well.  There were no complications.  We estimated blood loss to be minimal.    Mickey Crowley PA-C  provided critical assistance during the procedure.  His assistance was medically necessary in order to allow the procedure to occur in the most safe and efficient manner.    FILIBERTO Iyer MD     Date: 2/16/2024  Time: 15:49 CST

## 2024-02-16 NOTE — ANESTHESIA PROCEDURE NOTES
Airway  Urgency: elective    Date/Time: 2/16/2024 1:59 PM  Airway not difficult    General Information and Staff    Patient location during procedure: OR  CRNA/CAA: William Gar CRNA    Indications and Patient Condition  Indications for airway management: airway protection    Preoxygenated: yes  Mask difficulty assessment: 1 - vent by mask    Final Airway Details  Final airway type: endotracheal airway      Successful airway: ETT  Cuffed: yes   Successful intubation technique: direct laryngoscopy  Endotracheal tube insertion site: oral  Blade: Fernández  Blade size: 2  ETT size (mm): 7.5  Cormack-Lehane Classification: grade I - full view of glottis  Placement verified by: chest auscultation and capnometry   Cuff volume (mL): 10  Measured from: lips  ETT/EBT  to lips (cm): 23  Number of attempts at approach: 1  Assessment: lips, teeth, and gum same as pre-op and atraumatic intubation

## 2024-02-16 NOTE — ANESTHESIA PREPROCEDURE EVALUATION
Anesthesia Evaluation     Patient summary reviewed   no history of anesthetic complications:   NPO Solid Status: > 8 hours  NPO Liquid Status: > 8 hours           Airway   Mallampati: II  TM distance: >3 FB  No difficulty expected  Dental - normal exam     Pulmonary    (+) a smoker Former, asthma,sleep apnea (could not tolerate CPAP)  Cardiovascular   Exercise tolerance: excellent (>7 METS)    ECG reviewed    (+) hypertension      Neuro/Psych  GI/Hepatic/Renal/Endo    (+) GERD  (-) liver disease, no renal disease, diabetes    ROS Comment: S/p gastric sleeve    Musculoskeletal     Abdominal    Substance History      OB/GYN          Other                        Anesthesia Plan    ASA 2     general     (Hypokalemic. Pt did not receive po potassium in time. OK to proceed. I looked at telemetry in holding. Pt again is asymptomatic. If arrhythmias, will give IV potassium in OR. )  intravenous induction     Anesthetic plan, risks, benefits, and alternatives have been provided, discussed and informed consent has been obtained with: patient.      CODE STATUS:

## 2024-02-18 LAB
BACTERIA FLD CULT: ABNORMAL
GRAM STN SPEC: ABNORMAL
GRAM STN SPEC: ABNORMAL

## 2024-02-19 NOTE — ANESTHESIA POSTPROCEDURE EVALUATION
Patient: Kurtis Valladares    Procedure Summary       Date: 02/16/24 Room / Location:  [facility]  /  [facility]    Anesthesia Start: 1354 Anesthesia Stop: 1552    Procedure: IRRIGATION AND DEBRIDEMENT LUMBAR WOUND DEHISCENCE (Spine Lumbar) Diagnosis: (T14.8XXA)    Surgeons: PAWEL Iyre MD Provider: Jono Mcpherson CRNA    Anesthesia Type: general ASA Status: 2            Anesthesia Type: general    Vitals  Vitals Value Taken Time   /89 02/16/24 1706   Temp 97.6 °F (36.4 °C) 02/16/24 1705   Pulse 54 02/16/24 1714   Resp 20 02/16/24 1705   SpO2 95 % 02/16/24 1714   Vitals shown include unfiled device data.        Post Anesthesia Care and Evaluation    Patient location during evaluation: PHASE II  Patient participation: complete - patient participated  Level of consciousness: awake  Pain management: adequate    Airway patency: patent  Anesthetic complications: No anesthetic complications  Respiratory status: acceptable  Hydration status: acceptable

## 2024-02-23 LAB — FUNGUS WND CULT: NORMAL

## 2024-02-29 ENCOUNTER — TRANSCRIBE ORDERS (OUTPATIENT)
Dept: ADMINISTRATIVE | Facility: HOSPITAL | Age: 59
End: 2024-02-29
Payer: MEDICAID

## 2024-02-29 DIAGNOSIS — M54.16 LUMBAR RADICULOPATHY: Primary | ICD-10-CM

## 2024-03-01 ENCOUNTER — HOSPITAL ENCOUNTER (OUTPATIENT)
Dept: GENERAL RADIOLOGY | Facility: HOSPITAL | Age: 59
Discharge: HOME | End: 2024-03-01
Payer: MEDICAID

## 2024-03-01 DIAGNOSIS — M54.16 LUMBAR RADICULOPATHY: ICD-10-CM

## 2024-03-01 LAB — FUNGUS WND CULT: NORMAL

## 2024-03-01 PROCEDURE — 72100 X-RAY EXAM L-S SPINE 2/3 VWS: CPT

## 2024-03-08 LAB — FUNGUS WND CULT: NORMAL

## 2024-03-15 LAB — FUNGUS WND CULT: NORMAL

## 2024-03-22 LAB — FUNGUS WND CULT: NORMAL

## 2024-03-29 LAB — FUNGUS WND CULT: NORMAL

## 2024-05-07 ENCOUNTER — TRANSCRIBE ORDERS (OUTPATIENT)
Dept: ADMINISTRATIVE | Facility: HOSPITAL | Age: 59
End: 2024-05-07
Payer: MEDICAID

## 2024-05-07 DIAGNOSIS — Z98.890 OTHER SPECIFIED POSTPROCEDURAL STATES: Primary | ICD-10-CM

## 2024-05-24 ENCOUNTER — HOSPITAL ENCOUNTER (OUTPATIENT)
Dept: MRI IMAGING | Facility: HOSPITAL | Age: 59
Discharge: HOME | End: 2024-05-24
Payer: MEDICAID

## 2024-05-24 DIAGNOSIS — Z98.890 OTHER SPECIFIED POSTPROCEDURAL STATES: ICD-10-CM

## 2024-05-24 LAB — CREAT BLDA-MCNC: 0.9 MG/DL (ref 0.6–1.3)

## 2024-05-24 PROCEDURE — 82565 ASSAY OF CREATININE: CPT

## 2024-05-24 PROCEDURE — 72158 MRI LUMBAR SPINE W/O & W/DYE: CPT

## 2024-05-24 PROCEDURE — 0 GADOBENATE DIMEGLUMINE 529 MG/ML SOLUTION: Performed by: PHYSICIAN ASSISTANT

## 2024-05-24 PROCEDURE — A9577 INJ MULTIHANCE: HCPCS | Performed by: PHYSICIAN ASSISTANT

## 2024-05-24 RX ADMIN — GADOBENATE DIMEGLUMINE 20 ML: 529 INJECTION, SOLUTION INTRAVENOUS at 09:15

## 2024-08-12 ENCOUNTER — HOSPITAL ENCOUNTER (OUTPATIENT)
Facility: HOSPITAL | Age: 59
Setting detail: SURGERY ADMIT
DRG: 427 | End: 2024-08-12
Attending: ORTHOPAEDIC SURGERY | Admitting: ORTHOPAEDIC SURGERY
Payer: MEDICAID

## 2024-10-10 ENCOUNTER — TRANSCRIBE ORDERS (OUTPATIENT)
Dept: ADMINISTRATIVE | Facility: HOSPITAL | Age: 59
End: 2024-10-10
Payer: MEDICAID

## 2024-10-10 DIAGNOSIS — M54.12 CERVICAL RADICULOPATHY: Primary | ICD-10-CM

## 2024-10-22 ENCOUNTER — HOSPITAL ENCOUNTER (OUTPATIENT)
Dept: NEUROLOGY | Facility: HOSPITAL | Age: 59
Discharge: HOME | End: 2024-10-22
Admitting: ORTHOPAEDIC SURGERY
Payer: MEDICAID

## 2024-10-22 DIAGNOSIS — M54.12 CERVICAL RADICULOPATHY: ICD-10-CM

## 2024-10-22 PROCEDURE — 95886 MUSC TEST DONE W/N TEST COMP: CPT

## 2024-10-22 PROCEDURE — 95911 NRV CNDJ TEST 9-10 STUDIES: CPT

## 2025-03-07 ENCOUNTER — TELEPHONE (OUTPATIENT)
Dept: VASCULAR SURGERY | Facility: CLINIC | Age: 60
End: 2025-03-07
Payer: MEDICAID

## 2025-03-10 ENCOUNTER — TELEPHONE (OUTPATIENT)
Dept: VASCULAR SURGERY | Facility: CLINIC | Age: 60
End: 2025-03-10
Payer: MEDICAID

## 2025-03-10 ENCOUNTER — APPOINTMENT (OUTPATIENT)
Dept: PREADMISSION TESTING | Facility: HOSPITAL | Age: 60
End: 2025-03-10
Payer: MEDICAID

## 2025-03-11 ENCOUNTER — PRE-ADMISSION TESTING (OUTPATIENT)
Dept: PREADMISSION TESTING | Facility: HOSPITAL | Age: 60
End: 2025-03-11
Payer: MEDICAID

## 2025-03-11 ENCOUNTER — OFFICE VISIT (OUTPATIENT)
Dept: VASCULAR SURGERY | Facility: CLINIC | Age: 60
End: 2025-03-11
Payer: MEDICAID

## 2025-03-11 VITALS
HEART RATE: 53 BPM | HEIGHT: 72 IN | RESPIRATION RATE: 16 BRPM | DIASTOLIC BLOOD PRESSURE: 66 MMHG | BODY MASS INDEX: 31.92 KG/M2 | SYSTOLIC BLOOD PRESSURE: 122 MMHG | WEIGHT: 235.67 LBS | OXYGEN SATURATION: 99 %

## 2025-03-11 VITALS
SYSTOLIC BLOOD PRESSURE: 122 MMHG | OXYGEN SATURATION: 99 % | HEIGHT: 73 IN | HEART RATE: 82 BPM | DIASTOLIC BLOOD PRESSURE: 60 MMHG | WEIGHT: 235 LBS | BODY MASS INDEX: 31.14 KG/M2

## 2025-03-11 DIAGNOSIS — M54.16 LUMBAR RADICULOPATHY: Primary | ICD-10-CM

## 2025-03-11 DIAGNOSIS — I65.23 BILATERAL CAROTID ARTERY STENOSIS: ICD-10-CM

## 2025-03-11 DIAGNOSIS — I72.4 ANEURYSM OF LEFT POPLITEAL ARTERY: ICD-10-CM

## 2025-03-11 LAB
ALBUMIN SERPL-MCNC: 3.8 G/DL (ref 3.5–5.2)
ALBUMIN/GLOB SERPL: 1.7 G/DL
ALP SERPL-CCNC: 74 U/L (ref 39–117)
ALT SERPL W P-5'-P-CCNC: 13 U/L (ref 1–41)
ANION GAP SERPL CALCULATED.3IONS-SCNC: 11 MMOL/L (ref 5–15)
APTT PPP: 29.1 SECONDS (ref 24.5–36)
AST SERPL-CCNC: 22 U/L (ref 1–40)
BILIRUB SERPL-MCNC: 0.6 MG/DL (ref 0–1.2)
BILIRUB UR QL STRIP: NEGATIVE
BUN SERPL-MCNC: 9 MG/DL (ref 6–20)
BUN/CREAT SERPL: 10.3 (ref 7–25)
CALCIUM SPEC-SCNC: 8.6 MG/DL (ref 8.6–10.5)
CHLORIDE SERPL-SCNC: 103 MMOL/L (ref 98–107)
CLARITY UR: CLEAR
CO2 SERPL-SCNC: 30 MMOL/L (ref 22–29)
COLOR UR: YELLOW
CREAT SERPL-MCNC: 0.87 MG/DL (ref 0.76–1.27)
DEPRECATED RDW RBC AUTO: 44.5 FL (ref 37–54)
EGFRCR SERPLBLD CKD-EPI 2021: 100 ML/MIN/1.73
ERYTHROCYTE [DISTWIDTH] IN BLOOD BY AUTOMATED COUNT: 14.5 % (ref 12.3–15.4)
GLOBULIN UR ELPH-MCNC: 2.3 GM/DL
GLUCOSE SERPL-MCNC: 79 MG/DL (ref 65–99)
GLUCOSE UR STRIP-MCNC: NEGATIVE MG/DL
HCT VFR BLD AUTO: 34.5 % (ref 37.5–51)
HGB BLD-MCNC: 12.1 G/DL (ref 13–17.7)
HGB UR QL STRIP.AUTO: NEGATIVE
INR PPP: 1.06 (ref 0.91–1.09)
KETONES UR QL STRIP: NEGATIVE
LEUKOCYTE ESTERASE UR QL STRIP.AUTO: NEGATIVE
MCH RBC QN AUTO: 29.8 PG (ref 26.6–33)
MCHC RBC AUTO-ENTMCNC: 35.1 G/DL (ref 31.5–35.7)
MCV RBC AUTO: 85 FL (ref 79–97)
NITRITE UR QL STRIP: NEGATIVE
PH UR STRIP.AUTO: 7.5 [PH] (ref 5–8)
PLATELET # BLD AUTO: 188 10*3/MM3 (ref 140–450)
PMV BLD AUTO: 10.2 FL (ref 6–12)
POTASSIUM SERPL-SCNC: 2.2 MMOL/L (ref 3.5–5.2)
PROT SERPL-MCNC: 6.1 G/DL (ref 6–8.5)
PROT UR QL STRIP: NEGATIVE
PROTHROMBIN TIME: 14.3 SECONDS (ref 11.8–14.8)
RBC # BLD AUTO: 4.06 10*6/MM3 (ref 4.14–5.8)
SODIUM SERPL-SCNC: 144 MMOL/L (ref 136–145)
SP GR UR STRIP: 1.01 (ref 1–1.03)
UROBILINOGEN UR QL STRIP: NORMAL
WBC NRBC COR # BLD AUTO: 5.44 10*3/MM3 (ref 3.4–10.8)

## 2025-03-11 PROCEDURE — 36415 COLL VENOUS BLD VENIPUNCTURE: CPT

## 2025-03-11 PROCEDURE — 85730 THROMBOPLASTIN TIME PARTIAL: CPT

## 2025-03-11 PROCEDURE — 93005 ELECTROCARDIOGRAM TRACING: CPT

## 2025-03-11 PROCEDURE — 99203 OFFICE O/P NEW LOW 30 MIN: CPT | Performed by: SURGERY

## 2025-03-11 PROCEDURE — 1159F MED LIST DOCD IN RCRD: CPT | Performed by: SURGERY

## 2025-03-11 PROCEDURE — 80053 COMPREHEN METABOLIC PANEL: CPT

## 2025-03-11 PROCEDURE — 81003 URINALYSIS AUTO W/O SCOPE: CPT

## 2025-03-11 PROCEDURE — 85027 COMPLETE CBC AUTOMATED: CPT

## 2025-03-11 PROCEDURE — 1160F RVW MEDS BY RX/DR IN RCRD: CPT | Performed by: SURGERY

## 2025-03-11 PROCEDURE — 85610 PROTHROMBIN TIME: CPT

## 2025-03-11 RX ORDER — CEPHALEXIN 250 MG/1
1 CAPSULE ORAL
Status: ON HOLD | COMMUNITY
Start: 2025-03-06

## 2025-03-11 RX ORDER — MINOXIDIL 2.5 MG/1
1 TABLET ORAL DAILY
Status: ON HOLD | COMMUNITY
Start: 2025-03-07

## 2025-03-11 RX ORDER — FUROSEMIDE 20 MG/1
20 TABLET ORAL DAILY
Status: ON HOLD | COMMUNITY

## 2025-03-11 NOTE — DISCHARGE INSTRUCTIONS
Preparing for Surgery  Follow these instructions before the procedure:  Several days or weeks before your procedure  Medication(s) you need to stop   __TIRZEPATIDE 1week prior to surgery:       Ask your health care provider about:  Changing or stopping your regular medicines. This is especially important if you are taking diabetes medicines or blood thinners.  Taking medicines such as aspirin and ibuprofen. These medicines can thin your blood. Do not take these medicines unless your health care provider tells you to take them.  Taking over-the-counter medicines, vitamins, herbs, and supplements.    Contact your surgeon if you:  Develop a fever of more than 100.4°F (38°C) or other feelings of illness during the 48 hours before your surgery.  Have symptoms that get worse.  Have questions or concerns about your surgery.  If you are going home the same day of your surgery you will need to arrange for a responsible adult, age 18 years old or older, to drive you home from the hospital and stay with you for 24 hours. Verification of the  will be made prior to any procedure requiring sedation. You may not go home in a taxi or any form of public transportation by yourself.     Day before your proceduCE    24 hours before your procedure DO NOT drink alcoholic beverages or smoke.  24 hours before your procedure STOP taking Erectile Dysfunction medication (i.e.,Cialis, Viagra)   You may be asked to shower with a germ-killing soap.  Day of your procedure   You may take the following medication(s) the morning of surgery with a sip of water: OMEPRAZOLE, OXYCODONE      8 hours before your scheduled arrival time, STOP all food, any dairy products, and full liquids. This includes hard candy, chewing gum or mints. This is extremely important to prevent serious complications.     Up to 2 hours before your scheduled arrival time, you may have clear liquids no cream, powder, or pulp of any kind. Safe options are water, black  coffee, plain tea, soda, Gatorade/Powerade, clear broth, apple juice.    2 hours before your scheduled arrival time, STOP drinking clear liquids.    You may need to take another shower with a germ-killing soap before you leave home in the morning. Do not use perfumes, colognes, or body lotions.  Wear comfortable loose-fitting clothing.  Remove all jewelry including body piercing and rings, dark colored nail polish, and make up prior to arrival at the hospital. Leave all valuables at home.   Bring your hearing aids if you rely on them.  Do not wear contact lenses. If you wear eyeglasses remember to bring a case to store them in while you are in surgery.  Do not use denture adhesives since you will be asked to remove them during your surgery.    You do not need to bring your home medications into the hospital.   Bring your sleep apnea device with you on the day of your surgery (if this applies to you).  If you have an Inspire implant for sleep apnea, please bring the remote with you on the day of surgery.  If you wear portable oxygen, bring it with you.   If you are staying overnight, you may bring a bag of items you may need such as slippers, robe and a change of clothes for your discharge. You may want to leave these items in the car until you are ready for them since your family will take your belongings when you leave the pre-operative area.  Arrive at the hospital as scheduled by the office. You will be asked to arrive 2 hours prior to your surgery time in order to prepare for your procedure.  When you arrive at the hospital  Go to the registration desk located at the main entrance of the hospital.  After registration is completed, you will be given a beeper and a sticker sheet. Take the stickers to Outpatient Surgery and place in the tray at the end of the desk to notify the staff that you have arrived and registered.   Return to the lobby to wait. You are not always called back according to the time of arrival  but rather the time your doctor will be ready.  When your beeper lights up and vibrates proceed through the double doors, under the stairs, and a member of the Outpatient Surgery staff will escort you to your preoperative room.

## 2025-03-11 NOTE — PROGRESS NOTES
03/11/2025      PAWEL Iyer MD  [address]  [facility] [address]  [place] [address]    Kurtis Valladares  1965    Chief Complaint   Patient presents with    ALIF     OR with Dr. Iyer for ALIF       Dear PAWEL Iyer MD:      HPI  I had the pleasure of seeing your patient Kurtis Valladares in the office today.  Thank you kindly for this consultation.  As you recall, Kurtis Valladares is a 59 y.o.  male who you are currently following for chronic back pain.  Kurtis Valladares is scheduled for anterior lumbar interbody fusion of L5-S1 with Dr. Iyer on 3/17/25.  The patient denies any history of DVT.  His dad did die from AAA.      Past Medical History:   Diagnosis Date    Acute bronchitis     not since 2005    Arthritis     Asthma     Dyspnea     Enlarged prostate     GERD (gastroesophageal reflux disease)     Hypertension     Pain in lower limb     Sleep apnea     history of, pt states he thinks it has resolved    Sprain of ankle     Wound dehiscence, surgical 02/2024    lumbar wound       Past Surgical History:   Procedure Laterality Date    CARDIAC CATHETERIZATION N/A 01/22/2020    Procedure: Left Heart Cath/PCI if indicated;  Surgeon: Hawa Dominguez MD;  Location:  [facility] CATH INVASIVE LOC[facility];  Service: Cardiology    GASTRIC SLEEVE LAPAROSCOPIC  2020    INCISION AND DRAINAGE POSTERIOR SPINE N/A 12/13/2023    Procedure: IRRIGATION AND DEBRIDEMENT POSTERIOR LUMBAR WOUND;  Surgeon: PAWEL Iyer MD;  Location:  [facility] OR;  Service: Orthopedic Spine;  Laterality: N/A;    INCISION AND DRAINAGE POSTERIOR SPINE N/A 02/16/2024    Procedure: IRRIGATION AND DEBRIDEMENT LUMBAR WOUND DEHISCENCE;  Surgeon: PAWEL Iyer MD;  Location:  [facility] OR;  Service: Orthopedic Spine;  Laterality: N/A;    LUMBAR LAMINECTOMY WITH FUSION N/A 11/13/2023    Procedure: HEMILAMINECTOMY, DECOMPRESSION, PARTIAL FACETECTOMY, FORAMINOTOMY BILATERAL L3-4, LEFT L5-S1;  Surgeon: PAWEL Iyer MD;   Location:  [facility];  Service: Orthopedic Spine;  Laterality: N/A;       Family History   Problem Relation Age of Onset    Hypertension Other     Hypertension Mother     Hypertension Father        Social History     Socioeconomic History    Marital status:    Tobacco Use    Smoking status: Former     Current packs/day: 0.00     Types: Cigarettes     Quit date: 2005     Years since quittin.2    Smokeless tobacco: Never   Vaping Use    Vaping status: Never Used   Substance and Sexual Activity    Alcohol use: Yes     Comment: OCC    Drug use: No    Sexual activity: Defer       No Known Allergies    Current Outpatient Medications   Medication Instructions    Advair Diskus 100-50 MCG/ACT DISKUS 1 puff, 2 Times Daily - RT    albuterol sulfate  (90 Base) MCG/ACT inhaler 2 puffs, As Needed    amLODIPine (NORVASC) 10 mg, Oral, Daily    citalopram (CELEXA) 40 mg, Daily    diclofenac (VOLTAREN) 75 mg, 2 Times Daily    doxycycline (VIBRAMYICN) 200 mg, Daily    FINASTERIDE PO 1 mg, Daily    furosemide (LASIX) 20 mg, Daily    hydroCHLOROthiazide 25 mg, Oral, Daily    magnesium oxide (MAG-OX) 400 mg, Daily    minoxidil (LONITEN) 2.5 MG tablet 1 tablet, Daily    multivitamin with minerals tablet tablet 1 tablet, Daily    omeprazole (PRILOSEC) 20 mg, Daily    oxyCODONE-acetaminophen (PERCOCET)  MG per tablet 1 tablet, Oral, Every 6 Hours PRN    potassium chloride (KAYCIEL) 20 MEQ/15ML (10%) solution 20 mEq, Daily    tamsulosin (FLOMAX) 0.4 MG capsule 24 hr capsule 1 capsule, 2 Times Daily    Tirzepatide 10 mg, Weekly       Review of Systems   Constitutional: Negative.    HENT: Negative.     Eyes: Negative.    Respiratory: Negative.     Cardiovascular: Negative.    Gastrointestinal: Negative.    Endocrine: Negative.    Genitourinary: Negative.    Musculoskeletal:  Positive for back pain.   Skin: Negative.    Allergic/Immunologic: Negative.    Neurological: Negative.    Hematological: Negative.   "  Psychiatric/Behavioral: Negative.     All other systems reviewed and are negative.      /60   Pulse 82   Ht 185.4 cm (73\")   Wt 107 kg (235 lb)   SpO2 99%   BMI 31.00 kg/m²       Physical Exam  Vitals and nursing note reviewed.   Constitutional:       Appearance: He is well-developed. He is obese.   HENT:      Head: Normocephalic and atraumatic.   Eyes:      General: No scleral icterus.     Pupils: Pupils are equal, round, and reactive to light.   Neck:      Thyroid: No thyromegaly.      Vascular: No carotid bruit or JVD.   Cardiovascular:      Rate and Rhythm: Normal rate and regular rhythm.      Pulses:           Popliteal pulses are 2+ on the right side and 2+ on the left side.      Heart sounds: Normal heart sounds.      Comments: Left popliteal artery is prominent  Pulmonary:      Effort: Pulmonary effort is normal.      Breath sounds: Normal breath sounds.   Abdominal:      General: Bowel sounds are normal. There is no distension or abdominal bruit.      Palpations: Abdomen is soft. There is no mass.      Tenderness: There is no abdominal tenderness.   Musculoskeletal:         General: Normal range of motion.      Cervical back: Neck supple.      Lumbar back: Tenderness present.   Lymphadenopathy:      Cervical: No cervical adenopathy.   Skin:     General: Skin is warm and dry.   Neurological:      Mental Status: He is alert and oriented to person, place, and time.      Cranial Nerves: No cranial nerve deficit.      Sensory: No sensory deficit.         No results found.    Patient Active Problem List   Diagnosis    Right knee pain    Primary osteoarthritis of right knee    Effusion of right knee    Pain of right heel    Postcalcaneal bursitis of right foot    Pre-op evaluation    Abnormal nuclear stress test    Preop cardiovascular exam    Lumbar radiculopathy         ICD-10-CM ICD-9-CM   1. Lumbar radiculopathy  M54.16 724.4           Plan: After thoroughly evaluating Kurtis Valladares, I " believe the best course of action is to proceed with anterior lumbar interbody fusion of L5-S1.  Risks of ALIF were discussed and include, but are not limited to, bleeding, infection, nerve damage, vessel damage, retrograde ejaculation, bowel damage, ureteral damage, DVT, MI, stroke, and death.  The patient understands these risks and wishes to proceed with procedure.  The patient can continue taking their current medication regimen as previously planned.  This was all discussed in full with complete understanding.    Thank you for allowing me to participate in the care of your patient.  Please do not hesitate with any questions or concerns.  I will keep you aware of any further encounters with Kurtis Valladares.        Sincerely yours,         Montana Rangel, DO

## 2025-03-14 LAB
QT INTERVAL: 518 MS
QTC INTERVAL: 472 MS

## 2025-03-17 ENCOUNTER — APPOINTMENT (OUTPATIENT)
Dept: GENERAL RADIOLOGY | Facility: HOSPITAL | Age: 60
End: 2025-03-17
Payer: MEDICAID

## 2025-03-17 ENCOUNTER — HOSPITAL ENCOUNTER (OUTPATIENT)
Facility: HOSPITAL | Age: 60
Setting detail: SURGERY ADMIT
Discharge: HOME | End: 2025-03-17
Attending: ORTHOPAEDIC SURGERY | Admitting: ORTHOPAEDIC SURGERY
Payer: MEDICAID

## 2025-03-17 ENCOUNTER — ANESTHESIA EVENT (OUTPATIENT)
Dept: PERIOP | Facility: HOSPITAL | Age: 60
End: 2025-03-17
Payer: MEDICAID

## 2025-03-17 ENCOUNTER — HOSPITAL ENCOUNTER (OUTPATIENT)
Facility: HOSPITAL | Age: 60
Setting detail: OBSERVATION
Discharge: HOME | End: 2025-03-18
Attending: EMERGENCY MEDICINE | Admitting: INTERNAL MEDICINE
Payer: MEDICAID

## 2025-03-17 ENCOUNTER — ANESTHESIA (OUTPATIENT)
Dept: PERIOP | Facility: HOSPITAL | Age: 60
End: 2025-03-17
Payer: MEDICAID

## 2025-03-17 VITALS
BODY MASS INDEX: 31.53 KG/M2 | SYSTOLIC BLOOD PRESSURE: 146 MMHG | WEIGHT: 238.98 LBS | RESPIRATION RATE: 16 BRPM | TEMPERATURE: 96.7 F | HEART RATE: 44 BPM | OXYGEN SATURATION: 100 % | DIASTOLIC BLOOD PRESSURE: 78 MMHG

## 2025-03-17 DIAGNOSIS — E87.6 HYPOKALEMIA: Primary | ICD-10-CM

## 2025-03-17 DIAGNOSIS — R00.1 BRADYCARDIA: ICD-10-CM

## 2025-03-17 PROBLEM — M48.061 FORAMINAL STENOSIS OF LUMBAR REGION: Status: ACTIVE | Noted: 2025-03-17

## 2025-03-17 LAB
ABO GROUP BLD: NORMAL
ANION GAP SERPL CALCULATED.3IONS-SCNC: 9 MMOL/L (ref 5–15)
BASOPHILS # BLD AUTO: 0.03 10*3/MM3 (ref 0–0.2)
BASOPHILS NFR BLD AUTO: 0.6 % (ref 0–1.5)
BLD GP AB SCN SERPL QL: NEGATIVE
BUN SERPL-MCNC: 8 MG/DL (ref 6–20)
BUN/CREAT SERPL: 11.4 (ref 7–25)
CALCIUM SPEC-SCNC: 8.6 MG/DL (ref 8.6–10.5)
CHLORIDE SERPL-SCNC: 104 MMOL/L (ref 98–107)
CO2 SERPL-SCNC: 29 MMOL/L (ref 22–29)
CREAT SERPL-MCNC: 0.7 MG/DL (ref 0.76–1.27)
DEPRECATED RDW RBC AUTO: 44.6 FL (ref 37–54)
EGFRCR SERPLBLD CKD-EPI 2021: 106.8 ML/MIN/1.73
EOSINOPHIL # BLD AUTO: 0.2 10*3/MM3 (ref 0–0.4)
EOSINOPHIL NFR BLD AUTO: 4 % (ref 0.3–6.2)
ERYTHROCYTE [DISTWIDTH] IN BLOOD BY AUTOMATED COUNT: 14.3 % (ref 12.3–15.4)
GLUCOSE SERPL-MCNC: 86 MG/DL (ref 65–99)
HCT VFR BLD AUTO: 35.1 % (ref 37.5–51)
HGB BLD-MCNC: 11.8 G/DL (ref 13–17.7)
IMM GRANULOCYTES # BLD AUTO: 0.02 10*3/MM3 (ref 0–0.05)
IMM GRANULOCYTES NFR BLD AUTO: 0.4 % (ref 0–0.5)
LYMPHOCYTES # BLD AUTO: 1.51 10*3/MM3 (ref 0.7–3.1)
LYMPHOCYTES NFR BLD AUTO: 30.3 % (ref 19.6–45.3)
MAGNESIUM SERPL-MCNC: 2.4 MG/DL (ref 1.6–2.6)
MCH RBC QN AUTO: 28.9 PG (ref 26.6–33)
MCHC RBC AUTO-ENTMCNC: 33.6 G/DL (ref 31.5–35.7)
MCV RBC AUTO: 86 FL (ref 79–97)
MONOCYTES # BLD AUTO: 0.33 10*3/MM3 (ref 0.1–0.9)
MONOCYTES NFR BLD AUTO: 6.6 % (ref 5–12)
NEUTROPHILS NFR BLD AUTO: 2.89 10*3/MM3 (ref 1.7–7)
NEUTROPHILS NFR BLD AUTO: 58.1 % (ref 42.7–76)
NRBC BLD AUTO-RTO: 0 /100 WBC (ref 0–0.2)
OSMOLALITY SERPL: 292 MOSM/KG (ref 275–295)
OSMOLALITY UR: 173 MOSM/KG (ref 50–1400)
PLATELET # BLD AUTO: 171 10*3/MM3 (ref 140–450)
PMV BLD AUTO: 9.8 FL (ref 6–12)
POTASSIUM SERPL-SCNC: 2.3 MMOL/L (ref 3.5–5.2)
POTASSIUM SERPL-SCNC: 2.5 MMOL/L (ref 3.5–5.2)
POTASSIUM SERPL-SCNC: 2.7 MMOL/L (ref 3.5–5.2)
POTASSIUM UR-SCNC: 9.7 MMOL/L
RBC # BLD AUTO: 4.08 10*6/MM3 (ref 4.14–5.8)
RH BLD: NEGATIVE
SODIUM SERPL-SCNC: 142 MMOL/L (ref 136–145)
T&S EXPIRATION DATE: NORMAL
WBC NRBC COR # BLD AUTO: 4.98 10*3/MM3 (ref 3.4–10.8)

## 2025-03-17 PROCEDURE — 84132 ASSAY OF SERUM POTASSIUM: CPT

## 2025-03-17 PROCEDURE — G0463 HOSPITAL OUTPT CLINIC VISIT: HCPCS | Performed by: ORTHOPAEDIC SURGERY

## 2025-03-17 PROCEDURE — 99285 EMERGENCY DEPT VISIT HI MDM: CPT

## 2025-03-17 PROCEDURE — 94640 AIRWAY INHALATION TREATMENT: CPT

## 2025-03-17 PROCEDURE — 25010000002 POTASSIUM CHLORIDE 10 MEQ/100ML SOLUTION: Performed by: EMERGENCY MEDICINE

## 2025-03-17 PROCEDURE — 86900 BLOOD TYPING SEROLOGIC ABO: CPT | Performed by: ORTHOPAEDIC SURGERY

## 2025-03-17 PROCEDURE — 93005 ELECTROCARDIOGRAM TRACING: CPT | Performed by: EMERGENCY MEDICINE

## 2025-03-17 PROCEDURE — 93010 ELECTROCARDIOGRAM REPORT: CPT | Performed by: INTERNAL MEDICINE

## 2025-03-17 PROCEDURE — 83935 ASSAY OF URINE OSMOLALITY: CPT | Performed by: EMERGENCY MEDICINE

## 2025-03-17 PROCEDURE — 86901 BLOOD TYPING SEROLOGIC RH(D): CPT | Performed by: ORTHOPAEDIC SURGERY

## 2025-03-17 PROCEDURE — G0378 HOSPITAL OBSERVATION PER HR: HCPCS

## 2025-03-17 PROCEDURE — 25810000003 LACTATED RINGERS PER 1000 ML: Performed by: ORTHOPAEDIC SURGERY

## 2025-03-17 PROCEDURE — 94799 UNLISTED PULMONARY SVC/PX: CPT

## 2025-03-17 PROCEDURE — 96365 THER/PROPH/DIAG IV INF INIT: CPT

## 2025-03-17 PROCEDURE — 84133 ASSAY OF URINE POTASSIUM: CPT | Performed by: EMERGENCY MEDICINE

## 2025-03-17 PROCEDURE — 86850 RBC ANTIBODY SCREEN: CPT | Performed by: ORTHOPAEDIC SURGERY

## 2025-03-17 PROCEDURE — 36415 COLL VENOUS BLD VENIPUNCTURE: CPT

## 2025-03-17 PROCEDURE — 96366 THER/PROPH/DIAG IV INF ADDON: CPT

## 2025-03-17 PROCEDURE — 83735 ASSAY OF MAGNESIUM: CPT | Performed by: EMERGENCY MEDICINE

## 2025-03-17 PROCEDURE — 80048 BASIC METABOLIC PNL TOTAL CA: CPT | Performed by: ANESTHESIOLOGY

## 2025-03-17 PROCEDURE — 83930 ASSAY OF BLOOD OSMOLALITY: CPT | Performed by: EMERGENCY MEDICINE

## 2025-03-17 PROCEDURE — 85025 COMPLETE CBC W/AUTO DIFF WBC: CPT

## 2025-03-17 RX ORDER — POTASSIUM CHLORIDE 750 MG/1
40 CAPSULE, EXTENDED RELEASE ORAL ONCE
Status: COMPLETED | OUTPATIENT
Start: 2025-03-17 | End: 2025-03-17

## 2025-03-17 RX ORDER — OXYCODONE HCL 20 MG/1
20 TABLET, FILM COATED, EXTENDED RELEASE ORAL ONCE
Status: COMPLETED | OUTPATIENT
Start: 2025-03-17 | End: 2025-03-17

## 2025-03-17 RX ORDER — POTASSIUM CHLORIDE 29.8 MG/ML
20 INJECTION INTRAVENOUS ONCE
Status: DISCONTINUED | OUTPATIENT
Start: 2025-03-17 | End: 2025-03-17

## 2025-03-17 RX ORDER — PANTOPRAZOLE SODIUM 40 MG/1
40 TABLET, DELAYED RELEASE ORAL ONCE
Status: COMPLETED | OUTPATIENT
Start: 2025-03-17 | End: 2025-03-17

## 2025-03-17 RX ORDER — SODIUM CHLORIDE, SODIUM LACTATE, POTASSIUM CHLORIDE, CALCIUM CHLORIDE 600; 310; 30; 20 MG/100ML; MG/100ML; MG/100ML; MG/100ML
100 INJECTION, SOLUTION INTRAVENOUS CONTINUOUS PRN
Status: DISCONTINUED | OUTPATIENT
Start: 2025-03-17 | End: 2025-03-17 | Stop reason: HOSPADM

## 2025-03-17 RX ORDER — HYDROCHLOROTHIAZIDE 25 MG/1
25 TABLET ORAL DAILY
Status: DISCONTINUED | OUTPATIENT
Start: 2025-03-17 | End: 2025-03-18 | Stop reason: HOSPADM

## 2025-03-17 RX ORDER — BISACODYL 10 MG
10 SUPPOSITORY, RECTAL RECTAL DAILY PRN
Status: DISCONTINUED | OUTPATIENT
Start: 2025-03-17 | End: 2025-03-18 | Stop reason: HOSPADM

## 2025-03-17 RX ORDER — ONDANSETRON 2 MG/ML
4 INJECTION INTRAMUSCULAR; INTRAVENOUS EVERY 6 HOURS PRN
Status: DISCONTINUED | OUTPATIENT
Start: 2025-03-17 | End: 2025-03-18 | Stop reason: HOSPADM

## 2025-03-17 RX ORDER — SODIUM CHLORIDE 0.9 % (FLUSH) 0.9 %
10 SYRINGE (ML) INJECTION AS NEEDED
Status: DISCONTINUED | OUTPATIENT
Start: 2025-03-17 | End: 2025-03-17 | Stop reason: HOSPADM

## 2025-03-17 RX ORDER — CETIRIZINE HYDROCHLORIDE 10 MG/1
10 TABLET ORAL DAILY
Status: DISCONTINUED | OUTPATIENT
Start: 2025-03-17 | End: 2025-03-18 | Stop reason: HOSPADM

## 2025-03-17 RX ORDER — TAMSULOSIN HYDROCHLORIDE 0.4 MG/1
0.8 CAPSULE ORAL DAILY
Status: DISCONTINUED | OUTPATIENT
Start: 2025-03-17 | End: 2025-03-18 | Stop reason: HOSPADM

## 2025-03-17 RX ORDER — PANTOPRAZOLE SODIUM 40 MG/1
40 TABLET, DELAYED RELEASE ORAL
Status: DISCONTINUED | OUTPATIENT
Start: 2025-03-18 | End: 2025-03-18 | Stop reason: HOSPADM

## 2025-03-17 RX ORDER — POTASSIUM CHLORIDE 7.45 MG/ML
10 INJECTION INTRAVENOUS
Status: COMPLETED | OUTPATIENT
Start: 2025-03-17 | End: 2025-03-17

## 2025-03-17 RX ORDER — SODIUM CHLORIDE, SODIUM LACTATE, POTASSIUM CHLORIDE, CALCIUM CHLORIDE 600; 310; 30; 20 MG/100ML; MG/100ML; MG/100ML; MG/100ML
1000 INJECTION, SOLUTION INTRAVENOUS CONTINUOUS
Status: DISCONTINUED | OUTPATIENT
Start: 2025-03-17 | End: 2025-03-17 | Stop reason: HOSPADM

## 2025-03-17 RX ORDER — ALBUTEROL SULFATE 0.83 MG/ML
2.5 SOLUTION RESPIRATORY (INHALATION) EVERY 6 HOURS PRN
Status: DISCONTINUED | OUTPATIENT
Start: 2025-03-17 | End: 2025-03-18 | Stop reason: HOSPADM

## 2025-03-17 RX ORDER — AMOXICILLIN 250 MG
2 CAPSULE ORAL 2 TIMES DAILY PRN
Status: DISCONTINUED | OUTPATIENT
Start: 2025-03-17 | End: 2025-03-18 | Stop reason: HOSPADM

## 2025-03-17 RX ORDER — FUROSEMIDE 40 MG/1
20 TABLET ORAL DAILY
Status: DISCONTINUED | OUTPATIENT
Start: 2025-03-17 | End: 2025-03-18 | Stop reason: HOSPADM

## 2025-03-17 RX ORDER — HYDROCHLOROTHIAZIDE 50 MG/1
50 TABLET ORAL 2 TIMES DAILY
COMMUNITY
End: 2025-03-18 | Stop reason: HOSPADM

## 2025-03-17 RX ORDER — BISACODYL 5 MG/1
5 TABLET, DELAYED RELEASE ORAL DAILY PRN
Status: DISCONTINUED | OUTPATIENT
Start: 2025-03-17 | End: 2025-03-18 | Stop reason: HOSPADM

## 2025-03-17 RX ORDER — SODIUM CHLORIDE 9 MG/ML
40 INJECTION, SOLUTION INTRAVENOUS AS NEEDED
Status: DISCONTINUED | OUTPATIENT
Start: 2025-03-17 | End: 2025-03-18 | Stop reason: HOSPADM

## 2025-03-17 RX ORDER — MULTIPLE VITAMINS W/ MINERALS TAB 9MG-400MCG
1 TAB ORAL DAILY
Status: DISCONTINUED | OUTPATIENT
Start: 2025-03-17 | End: 2025-03-18 | Stop reason: HOSPADM

## 2025-03-17 RX ORDER — SODIUM CHLORIDE 0.9 % (FLUSH) 0.9 %
3 SYRINGE (ML) INJECTION AS NEEDED
Status: DISCONTINUED | OUTPATIENT
Start: 2025-03-17 | End: 2025-03-17 | Stop reason: HOSPADM

## 2025-03-17 RX ORDER — FINASTERIDE 1 MG/1
1 TABLET, FILM COATED ORAL DAILY
COMMUNITY

## 2025-03-17 RX ORDER — AMLODIPINE BESYLATE 10 MG/1
10 TABLET ORAL DAILY
Status: DISCONTINUED | OUTPATIENT
Start: 2025-03-17 | End: 2025-03-17

## 2025-03-17 RX ORDER — POTASSIUM CHLORIDE 1500 MG/1
40 TABLET, EXTENDED RELEASE ORAL EVERY 4 HOURS
Status: COMPLETED | OUTPATIENT
Start: 2025-03-17 | End: 2025-03-18

## 2025-03-17 RX ORDER — CITALOPRAM HYDROBROMIDE 20 MG/1
40 TABLET ORAL DAILY
Status: DISCONTINUED | OUTPATIENT
Start: 2025-03-17 | End: 2025-03-18 | Stop reason: HOSPADM

## 2025-03-17 RX ORDER — ARFORMOTEROL TARTRATE 15 UG/2ML
15 SOLUTION RESPIRATORY (INHALATION)
Status: DISCONTINUED | OUTPATIENT
Start: 2025-03-17 | End: 2025-03-18 | Stop reason: HOSPADM

## 2025-03-17 RX ORDER — SODIUM CHLORIDE 9 MG/ML
40 INJECTION, SOLUTION INTRAVENOUS AS NEEDED
Status: DISCONTINUED | OUTPATIENT
Start: 2025-03-17 | End: 2025-03-17 | Stop reason: HOSPADM

## 2025-03-17 RX ORDER — OXYCODONE AND ACETAMINOPHEN 10; 325 MG/1; MG/1
1 TABLET ORAL EVERY 6 HOURS PRN
Refills: 0 | Status: DISCONTINUED | OUTPATIENT
Start: 2025-03-17 | End: 2025-03-18 | Stop reason: HOSPADM

## 2025-03-17 RX ORDER — POLYETHYLENE GLYCOL 3350 17 G/17G
17 POWDER, FOR SOLUTION ORAL DAILY PRN
Status: DISCONTINUED | OUTPATIENT
Start: 2025-03-17 | End: 2025-03-18 | Stop reason: HOSPADM

## 2025-03-17 RX ORDER — BUDESONIDE 0.5 MG/2ML
0.5 INHALANT ORAL
Status: DISCONTINUED | OUTPATIENT
Start: 2025-03-17 | End: 2025-03-18 | Stop reason: HOSPADM

## 2025-03-17 RX ORDER — LORATADINE 10 MG/1
10 TABLET ORAL DAILY
COMMUNITY

## 2025-03-17 RX ORDER — DOXYCYCLINE 100 MG/1
100 CAPSULE ORAL EVERY 12 HOURS SCHEDULED
Status: DISCONTINUED | OUTPATIENT
Start: 2025-03-17 | End: 2025-03-18 | Stop reason: HOSPADM

## 2025-03-17 RX ORDER — SODIUM CHLORIDE 0.9 % (FLUSH) 0.9 %
10 SYRINGE (ML) INJECTION EVERY 12 HOURS SCHEDULED
Status: DISCONTINUED | OUTPATIENT
Start: 2025-03-17 | End: 2025-03-17 | Stop reason: HOSPADM

## 2025-03-17 RX ORDER — SODIUM CHLORIDE 0.9 % (FLUSH) 0.9 %
10 SYRINGE (ML) INJECTION AS NEEDED
Status: DISCONTINUED | OUTPATIENT
Start: 2025-03-17 | End: 2025-03-18 | Stop reason: HOSPADM

## 2025-03-17 RX ORDER — LIDOCAINE HYDROCHLORIDE 10 MG/ML
0.5 INJECTION, SOLUTION EPIDURAL; INFILTRATION; INTRACAUDAL; PERINEURAL ONCE AS NEEDED
Status: DISCONTINUED | OUTPATIENT
Start: 2025-03-17 | End: 2025-03-17 | Stop reason: HOSPADM

## 2025-03-17 RX ORDER — ONDANSETRON 4 MG/1
4 TABLET, ORALLY DISINTEGRATING ORAL EVERY 6 HOURS PRN
Status: DISCONTINUED | OUTPATIENT
Start: 2025-03-17 | End: 2025-03-18 | Stop reason: HOSPADM

## 2025-03-17 RX ORDER — ALBUTEROL SULFATE 90 UG/1
2 INHALANT RESPIRATORY (INHALATION) EVERY 6 HOURS PRN
Status: DISCONTINUED | OUTPATIENT
Start: 2025-03-17 | End: 2025-03-17 | Stop reason: CLARIF

## 2025-03-17 RX ORDER — SODIUM CHLORIDE 0.9 % (FLUSH) 0.9 %
10 SYRINGE (ML) INJECTION EVERY 12 HOURS SCHEDULED
Status: DISCONTINUED | OUTPATIENT
Start: 2025-03-17 | End: 2025-03-18 | Stop reason: HOSPADM

## 2025-03-17 RX ORDER — MINOXIDIL 2.5 MG/1
2.5 TABLET ORAL DAILY
Status: DISCONTINUED | OUTPATIENT
Start: 2025-03-17 | End: 2025-03-18 | Stop reason: HOSPADM

## 2025-03-17 RX ADMIN — BUDESONIDE 0.5 MG: 0.5 INHALANT RESPIRATORY (INHALATION) at 18:37

## 2025-03-17 RX ADMIN — POTASSIUM CHLORIDE 40 MEQ: 750 CAPSULE, EXTENDED RELEASE ORAL at 11:07

## 2025-03-17 RX ADMIN — POTASSIUM CHLORIDE 10 MEQ: 7.46 INJECTION, SOLUTION INTRAVENOUS at 11:10

## 2025-03-17 RX ADMIN — DOXYCYCLINE 100 MG: 100 CAPSULE ORAL at 21:18

## 2025-03-17 RX ADMIN — POTASSIUM CHLORIDE 40 MEQ: 750 CAPSULE, EXTENDED RELEASE ORAL at 13:22

## 2025-03-17 RX ADMIN — ARFORMOTEROL TARTRATE 15 MCG: 15 SOLUTION RESPIRATORY (INHALATION) at 18:37

## 2025-03-17 RX ADMIN — Medication 400 MG: at 16:51

## 2025-03-17 RX ADMIN — CETIRIZINE HYDROCHLORIDE 10 MG: 10 TABLET, FILM COATED ORAL at 16:51

## 2025-03-17 RX ADMIN — TAMSULOSIN HYDROCHLORIDE 0.8 MG: 0.4 CAPSULE ORAL at 13:22

## 2025-03-17 RX ADMIN — POTASSIUM CHLORIDE 10 MEQ: 7.46 INJECTION, SOLUTION INTRAVENOUS at 12:25

## 2025-03-17 RX ADMIN — Medication 1 TABLET: at 16:51

## 2025-03-17 RX ADMIN — DOXYCYCLINE 100 MG: 100 CAPSULE ORAL at 14:45

## 2025-03-17 RX ADMIN — Medication 10 ML: at 21:18

## 2025-03-17 RX ADMIN — POTASSIUM CHLORIDE 40 MEQ: 1500 TABLET, EXTENDED RELEASE ORAL at 23:25

## 2025-03-17 RX ADMIN — OXYCODONE HYDROCHLORIDE 20 MG: 20 TABLET, FILM COATED, EXTENDED RELEASE ORAL at 08:19

## 2025-03-17 RX ADMIN — MINOXIDIL 2.5 MG: 2.5 TABLET ORAL at 14:45

## 2025-03-17 RX ADMIN — SODIUM CHLORIDE, POTASSIUM CHLORIDE, SODIUM LACTATE AND CALCIUM CHLORIDE 1000 ML: 600; 310; 30; 20 INJECTION, SOLUTION INTRAVENOUS at 08:48

## 2025-03-17 RX ADMIN — CITALOPRAM HYDROBROMIDE 40 MG: 20 TABLET ORAL at 13:22

## 2025-03-17 RX ADMIN — PANTOPRAZOLE SODIUM 40 MG: 40 TABLET, DELAYED RELEASE ORAL at 21:18

## 2025-03-17 RX ADMIN — POTASSIUM CHLORIDE 40 MEQ: 1500 TABLET, EXTENDED RELEASE ORAL at 18:30

## 2025-03-17 NOTE — NURSING NOTE
Dr Foster at bedside to discuss today's K results of 2.3. Consulted with Dr Rangel and Dr Iyer to confirm cancelling surgery today and sending patient to ER for further evaluation.

## 2025-03-17 NOTE — ED PROVIDER NOTES
Subjective   History of Present Illness  Patient is 59 years old who was supposed to get surgery today and his potassium is 2.3    Abnormal Lab  Location:  Low potassium  Severity:  Moderate  Onset quality:  Gradual  Timing:  Intermittent  Chronicity:  Recurrent  Associated symptoms: no abdominal pain, no chest pain, no congestion, no cough, no diarrhea, no fatigue, no fever, no headaches, no loss of consciousness, no myalgias, no nausea, no rhinorrhea, no shortness of breath, no vomiting and no wheezing        Review of Systems   Constitutional: Negative.  Negative for chills, fatigue and fever.   HENT: Negative.  Negative for congestion and rhinorrhea.    Respiratory: Negative.  Negative for cough, chest tightness, shortness of breath, wheezing and stridor.    Cardiovascular: Negative.  Negative for chest pain.   Gastrointestinal: Negative.  Negative for abdominal distention, abdominal pain, diarrhea, nausea and vomiting.   Endocrine: Negative.    Genitourinary: Negative.  Negative for difficulty urinating and flank pain.   Musculoskeletal: Negative.  Negative for myalgias.   Skin: Negative.  Negative for color change.   Neurological: Negative.  Negative for dizziness, loss of consciousness and headaches.   All other systems reviewed and are negative.      Past Medical History:   Diagnosis Date    Acute bronchitis     not since 2005    Arthritis     Asthma     Dyspnea     Enlarged prostate     GERD (gastroesophageal reflux disease)     Hypertension     Pain in lower limb     Sleep apnea     history of, pt states he thinks it has resolved    Sprain of ankle     Wound dehiscence, surgical 02/2024    lumbar wound       No Known Allergies    Past Surgical History:   Procedure Laterality Date    CARDIAC CATHETERIZATION N/A 01/22/2020    Procedure: Left Heart Cath/PCI if indicated;  Surgeon: Hawa Dominguez MD;  Location: [facility] INVASIVE LOCATION;  Service: Cardiology    GASTRIC SLEEVE LAPAROSCOPIC  2020     INCISION AND DRAINAGE POSTERIOR SPINE N/A 2023    Procedure: IRRIGATION AND DEBRIDEMENT POSTERIOR LUMBAR WOUND;  Surgeon: PAWEL Iyer MD;  Location:  [facility] OR;  Service: Orthopedic Spine;  Laterality: N/A;    INCISION AND DRAINAGE POSTERIOR SPINE N/A 2024    Procedure: IRRIGATION AND DEBRIDEMENT LUMBAR WOUND DEHISCENCE;  Surgeon: PAWEL Iyer MD;  Location:  [facility] OR;  Service: Orthopedic Spine;  Laterality: N/A;    LUMBAR LAMINECTOMY WITH FUSION N/A 2023    Procedure: HEMILAMINECTOMY, DECOMPRESSION, PARTIAL FACETECTOMY, FORAMINOTOMY BILATERAL L3-4, LEFT L5-S1;  Surgeon: PAWEL Iyer MD;  Location:  [facility] OR;  Service: Orthopedic Spine;  Laterality: N/A;       Family History   Problem Relation Age of Onset    Hypertension Other     Hypertension Mother     Hypertension Father        Social History     Socioeconomic History    Marital status:    Tobacco Use    Smoking status: Former     Current packs/day: 0.00     Types: Cigarettes     Quit date: 2005     Years since quittin.2    Smokeless tobacco: Never   Vaping Use    Vaping status: Never Used   Substance and Sexual Activity    Alcohol use: Yes     Comment: OCC    Drug use: No    Sexual activity: Defer           Objective   Physical Exam  Vitals and nursing note reviewed. Exam conducted with a chaperone present.   Constitutional:       General: He is awake.      Appearance: Normal appearance. He is well-developed. He is not ill-appearing.   HENT:      Head: Normocephalic and atraumatic.   Eyes:      General: Lids are normal.      Conjunctiva/sclera: Conjunctivae normal.      Pupils: Pupils are equal, round, and reactive to light.   Cardiovascular:      Rate and Rhythm: Normal rate and regular rhythm.      Chest Wall: PMI is not displaced.      Pulses: Normal pulses.      Heart sounds: Normal heart sounds. No murmur heard.  Pulmonary:      Effort: Pulmonary effort is normal.      Breath sounds: Normal breath  sounds. No decreased breath sounds.   Abdominal:      General: Abdomen is flat. Bowel sounds are normal.      Palpations: Abdomen is soft.      Tenderness: There is no abdominal tenderness.   Musculoskeletal:         General: Normal range of motion.      Cervical back: Full passive range of motion without pain, normal range of motion and neck supple.   Skin:     General: Skin is warm and dry.      Capillary Refill: Capillary refill takes less than 2 seconds.   Neurological:      General: No focal deficit present.      Mental Status: He is alert and oriented to person, place, and time.      Motor: Motor function is intact.      Deep Tendon Reflexes: Reflexes are normal and symmetric.   Psychiatric:         Behavior: Behavior is cooperative.         Procedures           ED Course  ED Course as of 03/17/25 1129   Mon Mar 17, 2025   1118 EKG shows marked sinus bradycardia [TS]   1122 Case were discussed with the patient will admit to the hospitalist service. [TS]   1126 Bradycardia prior EKGs also [TS]      ED Course User Index  [TS] Mannie Zaragoza MD                                                       Medical Decision Making  Hypokalemia patient with recurrent hypokalemia and today's potassium is 2.2.  No neurological deficits and no chest pain no shortness of breath no muscle weakness.  He has restless legs which is chronic for him.    Problems Addressed:  Hypokalemia: acute illness or injury    Amount and/or Complexity of Data Reviewed  ECG/medicine tests: ordered.     Details: EKG reviewed  Discussion of management or test interpretation with external provider(s): Eliasd the hospitalist    Risk  Prescription drug management.  Decision regarding hospitalization.  Risk Details: Will give IV and p.o. potassium admitted to the hospital        Final diagnoses:   Hypokalemia   Bradycardia       ED Disposition  ED Disposition       ED Disposition   Decision to Admit    Condition   --    Comment   Level of Care: Telemetry  [5]   Diagnosis: Hypokalemia [[id number]]   Admitting Physician: CEASAR DEWEY [[id number]]   Attending Physician: CEASAR DEWEY [[id number]]                 No follow-up provider specified.       Medication List      No changes were made to your prescriptions during this visit.            Mannie Zaragoza MD  03/17/25 1123       Mannie Zaragoza MD  03/17/25 1129

## 2025-03-17 NOTE — H&P
[facility] Medicine Services  HISTORY AND PHYSICAL    Date of Admission: 3/17/2025  Primary Care Physician: Shira Worthington APRN    Subjective   Primary Historian: Patient    Chief Complaint: Abnormal lab    History of Present Illness  Kurtis Valladares is a 59-year-old male with a past medical history of acute bronchitis, GERD, chronic stable bradycardia, enlarged prostate, hypertension, LUCI that patient states has resolved, left leg radiculopathy and symptoms consistent with neurogenic claudication.  Patient was scheduled for lumbar radiculopathy with Dr. Ortiz today due to severe foraminal stenosis however preoperative laboratory data revealed a potassium of 2.2, patient was given p.o. potassium upon arrival and subsequent testing revealed potassium of 2.3.  Spinal surgery has been canceled for today hospital service was requested for admission and treatment of hypokalemia.    Upon assessment patient explains that he has a history of chronic hypokalemia he states that the last 3 surgeries he has had his preoperative potassium has been around 2.5.  Patient is supposed to be taking daily p.o. potassium at home he states he is not always good about taking it every day.  In addition approximately 1 month ago his PCP added Lasix for some chronic lower extremity edema without adjusting his potassium dose at home.  Patient has no complaints of muscle weakness other than his chronic left leg, no recent nausea, vomiting or diarrhea.  Patient has chronic restless leg which appears to be significant when I assessed the patient however he states that it is his baseline and he does not feel it is exacerbated.  No EKG abnormalities or arrhythmias during assessment.  Patient has chronic bradycardia in the 50s and is asymptomatic.  Patient was made aware he will be admitted for electrolyte replacement in the hopes the patient can be discharged tomorrow.  His main concern is this time is that  he has been waiting for the surgery since October of last year and he is hoping it can be rescheduled as soon as possible.  He was educated on the importance of taking his potassium per his home dose and was encouraged to speak up when placed on diuretics as he will need to be placed on additional potassium anytime this is initiated again.  Patient and wife's questions were answered to the best my ability and he is in agreement for observation and treatment.      Review of Systems   Constitutional:  Negative for fatigue.   Respiratory:  Negative for shortness of breath.    Cardiovascular:  Negative for chest pain and palpitations.   Gastrointestinal:  Negative for diarrhea, nausea and vomiting.   Musculoskeletal:  Positive for arthralgias and gait problem.   Neurological:  Positive for weakness (Chronic weakness to the left leg).      Otherwise complete ROS reviewed and negative except as mentioned in the HPI.    Past Medical History:   Past Medical History:   Diagnosis Date    Acute bronchitis     not since 2005    Arthritis     Asthma     Dyspnea     Enlarged prostate     GERD (gastroesophageal reflux disease)     Hypertension     Pain in lower limb     Sleep apnea     history of, pt states he thinks it has resolved    Sprain of ankle     Wound dehiscence, surgical 02/2024    lumbar wound     Past Surgical History:  Past Surgical History:   Procedure Laterality Date    CARDIAC CATHETERIZATION N/A 01/22/2020    Procedure: Left Heart Cath/PCI if indicated;  Surgeon: Hawa Dominguez MD;  Location:  [facility];  Service: Cardiology    GASTRIC SLEEVE LAPAROSCOPIC  2020    INCISION AND DRAINAGE POSTERIOR SPINE N/A 12/13/2023    Procedure: IRRIGATION AND DEBRIDEMENT POSTERIOR LUMBAR WOUND;  Surgeon: PAWEL Iyer MD;  Location:  [facility] OR;  Service: Orthopedic Spine;  Laterality: N/A;    INCISION AND DRAINAGE POSTERIOR SPINE N/A 02/16/2024    Procedure: IRRIGATION AND DEBRIDEMENT LUMBAR WOUND  DEHISCENCE;  Surgeon: PAWEL Iyer MD;  Location:  [facility] OR;  Service: Orthopedic Spine;  Laterality: N/A;    LUMBAR LAMINECTOMY WITH FUSION N/A 11/13/2023    Procedure: HEMILAMINECTOMY, DECOMPRESSION, PARTIAL FACETECTOMY, FORAMINOTOMY BILATERAL L3-4, LEFT L5-S1;  Surgeon: PAWEL Iyer MD;  Location:  [facility] OR;  Service: Orthopedic Spine;  Laterality: N/A;     Social History:  reports that he quit smoking about 19 years ago. His smoking use included cigarettes. He has never used smokeless tobacco. He reports current alcohol use. He reports that he does not use drugs.    Family History: family history includes Hypertension in his father, mother, and another family member.       Allergies:  No Known Allergies    Medications:  Prior to Admission medications    Medication Sig Start Date End Date Taking? Authorizing Provider   Advair Diskus 100-50 MCG/ACT DISKUS Inhale 1 puff 2 (Two) Times a Day. 3/6/25   Mercedes Casey MD   albuterol sulfate  (90 Base) MCG/ACT inhaler Inhale 2 puffs As Needed. 11/1/19   Mercedes Casey MD   amLODIPine (NORVASC) 10 MG tablet Take 1 tablet by mouth Daily. 1/4/17   Jono Lara MD   citalopram (CeleXA) 40 MG tablet Take 1 tablet by mouth Daily.    Mercedes Casey MD   diclofenac (VOLTAREN) 75 MG EC tablet Take 1 tablet by mouth 2 (Two) Times a Day. PT TAKES TWO 75MG PILLS IN THE AM; EVEN THOUGH ORDERED 75MG BID 10/17/19   Mercedes Casey MD   doxycycline (VIBRAMYICN) 100 MG tablet Take 2 tablets by mouth Daily.    Mercedes Casey MD   FINASTERIDE PO Take 1 mg by mouth Daily. 10/16/24   Mercedes Casey MD   furosemide (LASIX) 20 MG tablet Take 1 tablet by mouth Daily.    Mercedes Casey MD   hydrochlorothiazide (HYDRODIURIL) 25 MG tablet Take 1 tablet by mouth Daily.  Patient taking differently: Take 2 tablets by mouth 2 (Two) Times a Day. PT TAKES TWO 50MG PILLS IN THE AM EVEN THOUGH ORDERED 50MG BID 1/4/17   Jono Lara  "MD DARBY   magnesium oxide (MAG-OX) 400 MG tablet Take 1 tablet by mouth Daily.    Mercedes Casey MD   minoxidil (LONITEN) 2.5 MG tablet Take 1 tablet by mouth Daily. 3/7/25   Mercedes Casey MD   multivitamin with minerals tablet tablet Take 1 tablet by mouth Daily.    Mercedes Casey MD   omeprazole (priLOSEC) 20 MG capsule Take 1 capsule by mouth Daily.    Mercedes Casey MD   oxyCODONE-acetaminophen (PERCOCET)  MG per tablet Take 1 tablet by mouth Every 6 (Six) Hours As Needed for Moderate Pain. 11/13/23   PAWEL Iyer MD   potassium chloride (KAYCIEL) 20 MEQ/15ML (10%) solution Take 15 mL by mouth Daily.    Mercedes Casey MD   tamsulosin (FLOMAX) 0.4 MG capsule 24 hr capsule Take 2 capsules by mouth Daily.    Mercedes Casey MD   Tirzepatide 10 MG/0.5ML solution auto-injector Inject 10 mg under the skin into the appropriate area as directed 1 (One) Time Per Week.    Mercedes Casey MD     I have utilized all available immediate resources to obtain, update, or review the patient's current medications (including all prescriptions, over-the-counter products, herbals, cannabis/cannabidiol products, and vitamin/mineral/dietary (nutritional) supplements).    Objective     Vital Signs: /80   Pulse (!) 46   Temp 97.9 °F (36.6 °C) (Oral)   Resp 14   Ht 185.4 cm (73\")   Wt 108 kg (238 lb)   SpO2 99%   BMI 31.40 kg/m²   Physical Exam  Vitals and nursing note reviewed.   Constitutional:       General: He is not in acute distress.     Appearance: Normal appearance. He is not ill-appearing or toxic-appearing.   HENT:      Right Ear: Tympanic membrane normal.      Left Ear: Tympanic membrane normal.      Nose: Nose normal.      Mouth/Throat:      Mouth: Mucous membranes are moist.      Pharynx: Oropharynx is clear.   Eyes:      Pupils: Pupils are equal, round, and reactive to light.   Cardiovascular:      Rate and Rhythm: Normal rate and regular rhythm.      " Pulses: Normal pulses.   Pulmonary:      Effort: Pulmonary effort is normal.      Breath sounds: Normal breath sounds.   Abdominal:      General: Abdomen is flat. Bowel sounds are normal.      Palpations: Abdomen is soft.   Musculoskeletal:      Cervical back: Normal range of motion.      Left lower leg: Edema present.   Skin:     General: Skin is warm and dry.      Capillary Refill: Capillary refill takes less than 2 seconds.   Neurological:      Mental Status: He is alert.      Motor: Weakness present.      Gait: Gait abnormal.      Comments: Chronic left leg weakness          Results Reviewed:  Lab Results (last 24 hours)       Procedure Component Value Units Date/Time    Potassium [[id number]]  (Abnormal) Collected: 03/17/25 1055    Specimen: Blood Updated: 03/17/25 1242     Potassium 2.5 mmol/L     Osmolality, Urine - Urine, Clean Catch [[id number]]  (Normal) Collected: 03/17/25 1047    Specimen: Urine, Clean Catch Updated: 03/17/25 1151     Osmolality, Urine 173 mOsm/kg     Osmolality, Serum [[id number]]  (Normal) Collected: 03/17/25 1055    Specimen: Blood Updated: 03/17/25 1147     Osmolality 292 mOsm/kg     Potassium, Urine, Random - Urine, Clean Catch [[id number]] Collected: 03/17/25 1047    Specimen: Urine, Clean Catch Updated: 03/17/25 1126     Potassium, Urine 9.7 mmol/L     Narrative:      Reference intervals for random urine have not been established.  Clinical usage is dependent upon physician's interpretation in combination with other laboratory tests.       Magnesium [[id number]]  (Normal) Collected: 03/17/25 0821    Specimen: Blood Updated: 03/17/25 1052     Magnesium 2.4 mg/dL           Imaging Results (Last 24 Hours)       ** No results found for the last 24 hours. **            Assessment / Plan   Assessment:   Active Hospital Problems    Diagnosis     **Hypokalemia     Foraminal stenosis of lumbar region, severe     Bradycardia     Lumbar radiculopathy        Treatment Plan  The patient will be  admitted to Dr. Mcfadden service here at [facility].     1.  Hypokalemia-acute on chronic, potassium preoperatively on 3/11 was 2.2, p.o. was given preoperatively only improved to 2.3, patient has been given 10M EQ IV x 1 and 40 p.o. x 1 will initiate an additional 40 p.o. x 1 reevaluate labs at 1400 and initiate protocol.    2.  Foraminal stenosis/lumbar radiculopathy-assist patient rescheduling surgical intervention as soon as possible, continue home Percocet, Narcan and bowel regimen in place.    3.  Bradycardia-chronic asymptomatic, will initiate cardiac telemetry to continue to monitor with electrolyte imbalance.    VTE prophylaxis with SCDs  Labs in a.m.    Medical Decision Making  1 acute on chronic, high complexity, unchanged  3 chronic, moderate complexity, stable  Number and Complexity of problems: 4  Differential Diagnosis: None    Conditions and Status        Condition is unchanged.     [facility] Data  External documents reviewed: Review of orthopedic surgery office notes and history and physical.  Cardiac tracing (EKG, telemetry) interpretation: 3/17/2025 EKG personal review sinus bradycardia, atrial rate of 44, ventricular rate of 44, QTc 448.  Radiology interpretation: None  Labs reviewed: 3/17/2025 BMP, CBC pending  Any tests that were considered but not ordered: None     Decision rules/scores evaluated (example [id number], Wells, etc): None     Discussed with: Dr. Mcfadden, patient and his wife Candi     Care Planning  Shared decision making: Dr. Mcfadden, patient and his wife Candi   Code status and discussions: Full code per patient    Disposition  Social Determinants of Health that impact treatment or disposition: None  Estimated length of stay is 1-2 days    I confirmed that the patient's advanced care plan is present, code status is documented, and a surrogate decision maker is listed in the patient's medical record.     The patient's surrogate decision maker is his wife Candi.     The patient  was seen and examined by me on the at 1136.    Electronically signed by JESENIA Kumar, 03/17/25, 12:54 CDT.

## 2025-03-17 NOTE — ED NOTES
"Nursing report ED to floor  Kurtis Valladares  59 y.o.  male    HPI:   Chief Complaint   Patient presents with    Abnormal Lab       Admitting doctor:   Jono Mcfadden DO    Consulting provider(s):  Consults       No orders found from 2/16/2025 to 3/18/2025.             Admitting diagnosis:   The primary encounter diagnosis was Hypokalemia. A diagnosis of Bradycardia was also pertinent to this visit.    Code status:   Current Code Status       Date Active Code Status Order ID Comments User Context       3/17/2025 1237 CPR (Attempt to Resuscitate) [id number]  Ana APRN ED        Question Answer    Code Status (Patient has no pulse and is not breathing) CPR (Attempt to Resuscitate)    Medical Interventions (Patient has pulse or is breathing) Full Support    Level Of Support Discussed With Patient                    Allergies:   Patient has no known allergies.    Intake and Output    Intake/Output Summary (Last 24 hours) at 3/17/2025 1240  Last data filed at 3/17/2025 1221  Gross per 24 hour   Intake 100 ml   Output --   Net 100 ml       Weight:       03/17/25  1031   Weight: 108 kg (238 lb)       Most recent vitals:   Vitals:    03/17/25 1031 03/17/25 1100   BP: 156/86 148/80   BP Location: Left arm    Patient Position: Lying    Pulse: (!) 44 (!) 46   Resp: 14    Temp: 97.9 °F (36.6 °C)    TempSrc: Oral    SpO2: 100% 99%   Weight: 108 kg (238 lb)    Height: 185.4 cm (73\")      Oxygen Therapy: .    Active LDAs/IV Access:   Lines, Drains & Airways       Active LDAs       Name Placement date Placement time Site Days    Peripheral IV 03/17/25 0848 Right;Posterior Forearm 03/17/25  0848  Forearm  less than 1    Peripheral IV 03/17/25 0849 Left;Posterior Forearm 03/17/25  0849  Forearm  less than 1                    Labs (abnormal labs have a star):   Labs Reviewed   MAGNESIUM - Normal   OSMOLALITY, SERUM - Normal   OSMOLALITY, URINE - Normal   POTASSIUM, URINE, RANDOM    Narrative:     Reference intervals " for random urine have not been established.  Clinical usage is dependent upon physician's interpretation in combination with other laboratory tests.      POTASSIUM       Meds given in ED:   Medications   potassium chloride 10 mEq in 100 mL IVPB (10 mEq Intravenous New Bag 3/17/25 1225)   potassium chloride (MICRO-K/KLOR-CON) CR capsule (has no administration in time range)   Potassium Replacement - Follow Nurse / BPA Driven Protocol (has no administration in time range)   budesonide (PULMICORT) nebulizer solution 0.5 mg (has no administration in time range)     And   arformoterol (BROVANA) nebulizer solution 15 mcg (has no administration in time range)   amLODIPine (NORVASC) tablet 10 mg (has no administration in time range)   citalopram (CeleXA) tablet 40 mg (has no administration in time range)   doxycycline (MONODOX) capsule 100 mg (has no administration in time range)   furosemide (LASIX) tablet 20 mg ( Oral Dose Auto Held 3/25/25 0900)   hydroCHLOROthiazide tablet 25 mg ( Oral Dose Auto Held 3/25/25 0900)   magnesium oxide (MAG-OX) tablet 400 mg (has no administration in time range)   minoxidil (LONITEN) tablet 2.5 mg (has no administration in time range)   multivitamin with minerals 1 tablet (has no administration in time range)   pantoprazole (PROTONIX) EC tablet 40 mg (has no administration in time range)   tamsulosin (FLOMAX) 24 hr capsule 0.8 mg (has no administration in time range)   albuterol sulfate HFA (PROVENTIL HFA;VENTOLIN HFA;PROAIR HFA) inhaler 2 puff (has no administration in time range)   oxyCODONE-acetaminophen (PERCOCET)  MG per tablet 1 tablet (has no administration in time range)   potassium chloride (MICRO-K/KLOR-CON) CR capsule (40 mEq Oral Given 3/17/25 1107)           NIH Stroke Scale:       Isolation/Infection(s):  No active isolations   No active infections     COVID Testing  Collected .  Resulted .    Nursing report ED to floor:  Mental status: .  Ambulatory status:  .  Precautions: .    ED nurse phone extentsion- [phone]

## 2025-03-17 NOTE — H&P
The office history and physical exam was reviewed.  There are no changes.  Proceed with the first anterior stage of the procedure today.

## 2025-03-18 VITALS
OXYGEN SATURATION: 98 % | HEIGHT: 73 IN | RESPIRATION RATE: 18 BRPM | TEMPERATURE: 97.8 F | HEART RATE: 56 BPM | BODY MASS INDEX: 31.38 KG/M2 | DIASTOLIC BLOOD PRESSURE: 86 MMHG | SYSTOLIC BLOOD PRESSURE: 143 MMHG | WEIGHT: 236.8 LBS

## 2025-03-18 LAB
ANION GAP SERPL CALCULATED.3IONS-SCNC: 11 MMOL/L (ref 5–15)
BUN SERPL-MCNC: 9 MG/DL (ref 6–20)
BUN/CREAT SERPL: 12 (ref 7–25)
CALCIUM SPEC-SCNC: 8.5 MG/DL (ref 8.6–10.5)
CHLORIDE SERPL-SCNC: 108 MMOL/L (ref 98–107)
CO2 SERPL-SCNC: 26 MMOL/L (ref 22–29)
CREAT SERPL-MCNC: 0.75 MG/DL (ref 0.76–1.27)
EGFRCR SERPLBLD CKD-EPI 2021: 104.6 ML/MIN/1.73
GLUCOSE SERPL-MCNC: 96 MG/DL (ref 65–99)
MAGNESIUM SERPL-MCNC: 2.2 MG/DL (ref 1.6–2.6)
POTASSIUM SERPL-SCNC: 3.1 MMOL/L (ref 3.5–5.2)
POTASSIUM SERPL-SCNC: 3.6 MMOL/L (ref 3.5–5.2)
SODIUM SERPL-SCNC: 145 MMOL/L (ref 136–145)

## 2025-03-18 PROCEDURE — 80048 BASIC METABOLIC PNL TOTAL CA: CPT | Performed by: INTERNAL MEDICINE

## 2025-03-18 PROCEDURE — G0378 HOSPITAL OBSERVATION PER HR: HCPCS

## 2025-03-18 PROCEDURE — 84132 ASSAY OF SERUM POTASSIUM: CPT

## 2025-03-18 PROCEDURE — 83735 ASSAY OF MAGNESIUM: CPT

## 2025-03-18 PROCEDURE — 94664 DEMO&/EVAL PT USE INHALER: CPT

## 2025-03-18 PROCEDURE — 94799 UNLISTED PULMONARY SVC/PX: CPT

## 2025-03-18 RX ORDER — HYDROCHLOROTHIAZIDE 25 MG/1
50 TABLET ORAL DAILY
Start: 2025-03-18

## 2025-03-18 RX ORDER — POTASSIUM CHLORIDE 750 MG/1
40 CAPSULE, EXTENDED RELEASE ORAL EVERY 4 HOURS
Status: COMPLETED | OUTPATIENT
Start: 2025-03-18 | End: 2025-03-18

## 2025-03-18 RX ADMIN — TAMSULOSIN HYDROCHLORIDE 0.8 MG: 0.4 CAPSULE ORAL at 08:36

## 2025-03-18 RX ADMIN — BUDESONIDE 0.5 MG: 0.5 INHALANT RESPIRATORY (INHALATION) at 06:58

## 2025-03-18 RX ADMIN — CITALOPRAM HYDROBROMIDE 40 MG: 20 TABLET ORAL at 08:36

## 2025-03-18 RX ADMIN — Medication 400 MG: at 08:36

## 2025-03-18 RX ADMIN — POTASSIUM CHLORIDE 40 MEQ: 1500 TABLET, EXTENDED RELEASE ORAL at 03:10

## 2025-03-18 RX ADMIN — MINOXIDIL 2.5 MG: 2.5 TABLET ORAL at 08:36

## 2025-03-18 RX ADMIN — POTASSIUM CHLORIDE 40 MEQ: 750 CAPSULE, EXTENDED RELEASE ORAL at 08:36

## 2025-03-18 RX ADMIN — Medication 1 TABLET: at 08:36

## 2025-03-18 RX ADMIN — POTASSIUM CHLORIDE 40 MEQ: 750 CAPSULE, EXTENDED RELEASE ORAL at 12:27

## 2025-03-18 RX ADMIN — PANTOPRAZOLE SODIUM 40 MG: 40 TABLET, DELAYED RELEASE ORAL at 05:59

## 2025-03-18 RX ADMIN — ARFORMOTEROL TARTRATE 15 MCG: 15 SOLUTION RESPIRATORY (INHALATION) at 06:58

## 2025-03-18 RX ADMIN — DOXYCYCLINE 100 MG: 100 CAPSULE ORAL at 08:36

## 2025-03-18 RX ADMIN — Medication 10 ML: at 08:37

## 2025-03-18 NOTE — DISCHARGE SUMMARY
[facility] Medicine Services  DISCHARGE SUMMARY       Date of Admission: 3/17/2025  Date of Discharge:  3/18/2025  Primary Care Physician: Shira Worthington APRN    Presenting Problem/History of Present Illness:  Abnormal lab    Final Discharge Diagnoses:  Active Hospital Problems    Diagnosis     **Hypokalemia     Foraminal stenosis of lumbar region, severe     Bradycardia     Lumbar radiculopathy        Consults: None    Procedures Performed: None    Pertinent Test Results:   Results for orders placed in visit on 11/25/19    Adult Transthoracic Echo Complete W/ Cont if Necessary Per Protocol    Interpretation Summary  · Left ventricular wall thickness is consistent with borderline concentric hypertrophy.  · Left atrial cavity size is borderline dilated.      Imaging Results (All)       None          LAB RESULTS:      Lab 03/17/25  1448   WBC 4.98   HEMOGLOBIN 11.8*   HEMATOCRIT 35.1*   PLATELETS 171   NEUTROS ABS 2.89   IMMATURE GRANS (ABS) 0.02   LYMPHS ABS 1.51   MONOS ABS 0.33   EOS ABS 0.20   MCV 86.0         Lab 03/18/25  1454 03/18/25  0732 03/17/25  1448 03/17/25  1055 03/17/25  0821   SODIUM  --  145  --   --  142   POTASSIUM 3.6 3.1* 2.7* 2.5* 2.3*   CHLORIDE  --  108*  --   --  104   CO2  --  26.0  --   --  29.0   ANION GAP  --  11.0  --   --  9.0   BUN  --  9  --   --  8   CREATININE  --  0.75*  --   --  0.70*   EGFR  --  104.6  --   --  106.8   GLUCOSE  --  96  --   --  86   CALCIUM  --  8.5*  --   --  8.6   MAGNESIUM  --  2.2  --   --  2.4                         Lab 03/17/25  0821   ABO TYPING B   RH TYPING Negative   ANTIBODY SCREEN Negative         Brief Urine Lab Results  (Last result in the past 365 days)        Color   Clarity   Blood   Leuk Est   Nitrite   Protein   CREAT   Urine HCG        03/11/25 1217 Yellow   Clear   Negative   Negative   Negative   Negative                 Microbiology Results (last 10 days)       ** No results found for the last 240  "hours. **          Microbiology Results (last 10 days)       ** No results found for the last 240 hours. **             Documented weights    03/17/25 1031 03/17/25 1257   Weight: 108 kg (238 lb) 107 kg (236 lb 12.8 oz)        Hospital Course: Kurtis Valladares is a 59-year-old male with a past medical history of acute bronchitis, GERD, chronic stable bradycardia, enlarged prostate, hypertension, LUCI that patient states has resolved, left leg radiculopathy and symptoms consistent with neurogenic claudication.  Patient was scheduled for lumbar radiculopathy with Dr. Ortiz today due to severe foraminal stenosis however preoperative laboratory data revealed a potassium of 2.2, patient was given p.o. potassium upon arrival and subsequent testing revealed potassium of 2.3.  Spinal surgery has been canceled for today hospital service was requested for admission and treatment of hypokalemia.     Patient was placed on potassium protocol, this morning potassium 3.1, patient was given an additional 40 mEq every 4 x 2 doses repeat potassium 3.6.  Patient had no overnight arrhythmia, maintained a baseline stable bradycardia at 45-55.  Patient was instructed to discontinue Lasix, to make sure that he takes his daily potassium and to discuss further with PCP.    Dr. Iyer, orthopedic surgery office will call him with the new date and time for his surgical intervention.    All patient's questions were answered to the best my ability and he is in agreement for discharge home today.  Patient has reached the maximum benefit of hospitalization and is stable for discharge  Patient has been evaluated today 03/18/25 and is stable for discharge.     Physical Exam on Discharge:  /86 (BP Location: Left arm, Patient Position: Lying)   Pulse 56   Temp 97.8 °F (36.6 °C) (Oral)   Resp 18   Ht 185.4 cm (73\")   Wt 107 kg (236 lb 12.8 oz)   SpO2 98%   BMI 31.24 kg/m²   Physical Exam  Vitals and nursing note reviewed.   Constitutional: "       General: He is not in acute distress.     Appearance: Normal appearance. He is not ill-appearing or toxic-appearing.   HENT:      Right Ear: Tympanic membrane normal.      Left Ear: Tympanic membrane normal.      Nose: Nose normal.      Mouth/Throat:      Mouth: Mucous membranes are moist.      Pharynx: Oropharynx is clear.   Eyes:      Pupils: Pupils are equal, round, and reactive to light.   Cardiovascular:      Rate and Rhythm: Normal rate and regular rhythm.      Pulses: Normal pulses.   Pulmonary:      Effort: Pulmonary effort is normal.      Breath sounds: Normal breath sounds.   Abdominal:      General: Abdomen is flat. Bowel sounds are normal.      Palpations: Abdomen is soft.   Musculoskeletal:      Cervical back: Normal range of motion.      Left lower leg: Edema present.   Skin:     General: Skin is warm and dry.      Capillary Refill: Capillary refill takes less than 2 seconds.   Neurological:      Mental Status: He is alert.      Motor: Weakness present.      Gait: Gait abnormal.      Comments: Chronic left leg weakness        Condition on Discharge: Able for discharge home    Discharge Disposition:  Home or Self Care    Discharge Medications:     Discharge Medications        Changes to Medications        Instructions Start Date   hydroCHLOROthiazide 25 MG tablet  What changed:   how much to take  Another medication with the same name was removed. Continue taking this medication, and follow the directions you see here.   50 mg, Oral, Daily             Continue These Medications        Instructions Start Date   Advair Diskus 100-50 MCG/ACT DISKUS  Generic drug: Fluticasone-Salmeterol   1 puff, Inhalation, 2 Times Daily - RT      albuterol sulfate  (90 Base) MCG/ACT inhaler  Commonly known as: PROVENTIL HFA;VENTOLIN HFA;PROAIR HFA   2 puffs, Inhalation, As Needed      citalopram 40 MG tablet  Commonly known as: CeleXA   40 mg, Oral, Daily      diclofenac 75 MG EC tablet  Commonly known as:  VOLTAREN   150 mg, Oral, Daily      doxycycline 100 MG tablet  Commonly known as: VIBRAMYICN   200 mg, Oral, Daily      finasteride 1 MG tablet  Commonly known as: PROPECIA   1 mg, Oral, Daily      loratadine 10 MG tablet  Commonly known as: CLARITIN   10 mg, Oral, Daily      magnesium oxide 400 MG tablet  Commonly known as: MAG-OX   400 mg, Oral, Daily      minoxidil 2.5 MG tablet  Commonly known as: LONITEN   1 tablet, Oral, Daily      multivitamin with minerals tablet tablet   1 tablet, Oral, Daily      omeprazole 20 MG capsule  Commonly known as: priLOSEC   20 mg, Oral, Daily      oxyCODONE-acetaminophen  MG per tablet  Commonly known as: PERCOCET   1 tablet, Oral, Every 6 Hours PRN      Potassium Chloride 40 MEQ/15ML (20%) solution   40 mEq, Oral, Daily      tamsulosin 0.4 MG capsule 24 hr capsule  Commonly known as: FLOMAX   1 capsule, 2 Times Daily      Tirzepatide 10 MG/0.5ML solution auto-injector   10 mg, Subcutaneous, Weekly, Sundays             Stop These Medications      furosemide 20 MG tablet  Commonly known as: LASIX              Discharge Diet: Advance diet as tolerated    Activity at Discharge:   Activity Instructions       Activity as Tolerated              Discharge Instructions:   1.  Patient was instructed to return to medical attention for any new or worsening chest pain, shortness of breath or excessive leg cramping.  2.  Patient was instructed to follow-up with PCP in 1 week.  3.  Patient was instructed that Dr. Iyer, orthopedic surgery's office will call him with new date and time for surgical intervention.  4.  Patient was instructed to discontinue Lasix and to make sure that he takes his p.o. potassium daily and to discuss further with PCP.    Follow-up Appointments:   Future Appointments   Date Time Provider Department Center   9/11/2025 11:30 AM Anali Leon APRN [facility]       Test Results Pending at Discharge: None    Electronically signed by APRYL Kumar,  03/18/25, 15:30 CDT.    Time: 5 minutes.

## 2025-03-18 NOTE — PLAN OF CARE
Goal Outcome Evaluation:      Pt has had a good night. Continue with po K+. Next recheck is at 7am this morning. Alert and oriented x4. Drs will determine if surgery will be rescheduled this wk or dc and come back for surgery. Up ad dinesh. Pt has routine K+ script for home but does not take as ordered. Also pt was ordered Lasix for left leg swelling and a K+ adjustment was not made.Maintained safety.

## 2025-03-19 LAB
QT INTERVAL: 524 MS
QTC INTERVAL: 448 MS

## 2025-03-21 NOTE — PAYOR COMM NOTE
"3/17/25 TO 3/18/25, PATIENT WAS OBSERVATION ENTIRE STAY    Viviane Ley (59 y.o. Male)       Date of Birth   1965    Social Security Number       Address   [address] [place] [address]    Home Phone   [phone]    MRN   [id number]       Quaker   Jain    Marital Status                               Admission Date   3/17/2025    Admission Type   Emergency    Admitting Provider   Jono Mcfadden DO    Attending Provider       Department, Room/Bed   [facility] 4B, 411/1       Discharge Date   3/18/2025    Discharge Disposition   Home or Self Care    Discharge Destination                                 Attending Provider: (none)   Allergies: No Known Allergies    Isolation: None   Infection: None   Code Status: Prior    Ht: 185.4 cm (73\")   Wt: 107 kg (236 lb 12.8 oz)    Admission Cmt: None   Principal Problem: Hypokalemia [E87.6]                   Active Insurance as of 3/17/2025       Primary Coverage       Payor Plan Insurance Group Employer/Plan Group    [facility] COMMUNITY PLAN On license of [facility] PLAN [facility]       Payor Plan Address Payor Plan Phone Number Payor Plan Fax Number Effective Dates    [address]   1/1/2025 - None Entered    [facility] [id number]         Subscriber Name Subscriber Birth Date Member ID       VIVIANE LEY 1965 [id number]                     Emergency Contacts        (Rel.) Home Phone Work Phone Mobile Phone    Candi Ley (Spouse) -- -- [phone]    Kourtney Ley (Daughter) -- -- [phone]                 History & Physical        , JESENIA Perez at 03/17/25 1136       Attestation signed by Jono Mcfadden DO at 03/17/25 1651    I performed a substantive part of the MDM during the patient’s E/M visit. I personally made or   approved the documented management plan and acknowledge its risk of complications.   (Independent Interpretation) My (EKG/X-Ray/US/CT) interpretation - no " imaging performed  (Discussion) Management/test interpretation discussed with Ana PuenteorJESENIA.    Patient presents today for spinal surgery with orthospine.  He was found to have preop potassium of 2.3 so surgery was canceled.  Some potassium was given in the ER but ultimately request was made to admit to the hospital for treatment of hypokalemia.  He is not overtly symptomatic from this.  He has a history of chronic hypokalemia.  Will treat per hypokalemia protocol with replacement and hope for improvement and discharge home tomorrow.  Magnesium level is normal.    Electronically signed by Jono Mcfadden DO, 3/17/2025, 16:49 CDT.                      [facility] Medicine Services  HISTORY AND PHYSICAL    Date of Admission: 3/17/2025  Primary Care Physician: Shira Worthington APRN    Subjective   Primary Historian: Patient    Chief Complaint: Abnormal lab    History of Present Illness  Kurtis Valladares is a 59-year-old male with a past medical history of acute bronchitis, GERD, chronic stable bradycardia, enlarged prostate, hypertension, LUCI that patient states has resolved, left leg radiculopathy and symptoms consistent with neurogenic claudication.  Patient was scheduled for lumbar radiculopathy with Dr. Ortiz today due to severe foraminal stenosis however preoperative laboratory data revealed a potassium of 2.2, patient was given p.o. potassium upon arrival and subsequent testing revealed potassium of 2.3.  Spinal surgery has been canceled for today hospital service was requested for admission and treatment of hypokalemia.    Upon assessment patient explains that he has a history of chronic hypokalemia he states that the last 3 surgeries he has had his preoperative potassium has been around 2.5.  Patient is supposed to be taking daily p.o. potassium at home he states he is not always good about taking it every day.  In addition approximately 1 month ago his PCP added Lasix  for some chronic lower extremity edema without adjusting his potassium dose at home.  Patient has no complaints of muscle weakness other than his chronic left leg, no recent nausea, vomiting or diarrhea.  Patient has chronic restless leg which appears to be significant when I assessed the patient however he states that it is his baseline and he does not feel it is exacerbated.  No EKG abnormalities or arrhythmias during assessment.  Patient has chronic bradycardia in the 50s and is asymptomatic.  Patient was made aware he will be admitted for electrolyte replacement in the hopes the patient can be discharged tomorrow.  His main concern is this time is that he has been waiting for the surgery since October of last year and he is hoping it can be rescheduled as soon as possible.  He was educated on the importance of taking his potassium per his home dose and was encouraged to speak up when placed on diuretics as he will need to be placed on additional potassium anytime this is initiated again.  Patient and wife's questions were answered to the best my ability and he is in agreement for observation and treatment.      Review of Systems   Constitutional:  Negative for fatigue.   Respiratory:  Negative for shortness of breath.    Cardiovascular:  Negative for chest pain and palpitations.   Gastrointestinal:  Negative for diarrhea, nausea and vomiting.   Musculoskeletal:  Positive for arthralgias and gait problem.   Neurological:  Positive for weakness (Chronic weakness to the left leg).      Otherwise complete ROS reviewed and negative except as mentioned in the HPI.    Past Medical History:   Past Medical History:   Diagnosis Date    Acute bronchitis     not since 2005    Arthritis     Asthma     Dyspnea     Enlarged prostate     GERD (gastroesophageal reflux disease)     Hypertension     Pain in lower limb     Sleep apnea     history of, pt states he thinks it has resolved    Sprain of ankle     Wound dehiscence,  surgical 02/2024    lumbar wound     Past Surgical History:  Past Surgical History:   Procedure Laterality Date    CARDIAC CATHETERIZATION N/A 01/22/2020    Procedure: Left Heart Cath/PCI if indicated;  Surgeon: Hawa Dominguez MD;  Location:  [facility];  Service: Cardiology    GASTRIC SLEEVE LAPAROSCOPIC  2020    INCISION AND DRAINAGE POSTERIOR SPINE N/A 12/13/2023    Procedure: IRRIGATION AND DEBRIDEMENT POSTERIOR LUMBAR WOUND;  Surgeon: PAWEL Iyer MD;  Location:  [facility] OR;  Service: Orthopedic Spine;  Laterality: N/A;    INCISION AND DRAINAGE POSTERIOR SPINE N/A 02/16/2024    Procedure: IRRIGATION AND DEBRIDEMENT LUMBAR WOUND DEHISCENCE;  Surgeon: PAWEL Iyer MD;  Location:  [facility] OR;  Service: Orthopedic Spine;  Laterality: N/A;    LUMBAR LAMINECTOMY WITH FUSION N/A 11/13/2023    Procedure: HEMILAMINECTOMY, DECOMPRESSION, PARTIAL FACETECTOMY, FORAMINOTOMY BILATERAL L3-4, LEFT L5-S1;  Surgeon: PAWEL Iyer MD;  Location:  [facility] OR;  Service: Orthopedic Spine;  Laterality: N/A;     Social History:  reports that he quit smoking about 19 years ago. His smoking use included cigarettes. He has never used smokeless tobacco. He reports current alcohol use. He reports that he does not use drugs.    Family History: family history includes Hypertension in his father, mother, and another family member.       Allergies:  No Known Allergies    Medications:  Prior to Admission medications    Medication Sig Start Date End Date Taking? Authorizing Provider   Advair Diskus 100-50 MCG/ACT DISKUS Inhale 1 puff 2 (Two) Times a Day. 3/6/25   Mercedes Casey MD   albuterol sulfate  (90 Base) MCG/ACT inhaler Inhale 2 puffs As Needed. 11/1/19   Mercedes Casey MD   amLODIPine (NORVASC) 10 MG tablet Take 1 tablet by mouth Daily. 1/4/17   Jono Lara MD   citalopram (CeleXA) 40 MG tablet Take 1 tablet by mouth Daily.    Mercedes Casey MD   diclofenac (VOLTAREN)  75 MG EC tablet Take 1 tablet by mouth 2 (Two) Times a Day. PT TAKES TWO 75MG PILLS IN THE AM; EVEN THOUGH ORDERED 75MG BID 10/17/19   Mercedes Casey MD   doxycycline (VIBRAMYICN) 100 MG tablet Take 2 tablets by mouth Daily.    Mercedes Casey MD   FINASTERIDE PO Take 1 mg by mouth Daily. 10/16/24   Mercedes Casey MD   furosemide (LASIX) 20 MG tablet Take 1 tablet by mouth Daily.    Mercedes Casey MD   hydrochlorothiazide (HYDRODIURIL) 25 MG tablet Take 1 tablet by mouth Daily.  Patient taking differently: Take 2 tablets by mouth 2 (Two) Times a Day. PT TAKES TWO 50MG PILLS IN THE AM EVEN THOUGH ORDERED 50MG BID 1/4/17   Jono Lara MD   magnesium oxide (MAG-OX) 400 MG tablet Take 1 tablet by mouth Daily.    Mercedes Casey MD   minoxidil (LONITEN) 2.5 MG tablet Take 1 tablet by mouth Daily. 3/7/25   Mercedes Casey MD   multivitamin with minerals tablet tablet Take 1 tablet by mouth Daily.    Mercedes Casey MD   omeprazole (priLOSEC) 20 MG capsule Take 1 capsule by mouth Daily.    Mercedes Casey MD   oxyCODONE-acetaminophen (PERCOCET)  MG per tablet Take 1 tablet by mouth Every 6 (Six) Hours As Needed for Moderate Pain. 11/13/23   PAWEL Iyer MD   potassium chloride (KAYCIEL) 20 MEQ/15ML (10%) solution Take 15 mL by mouth Daily.    Mercedes Casey MD   tamsulosin (FLOMAX) 0.4 MG capsule 24 hr capsule Take 2 capsules by mouth Daily.    Mercedes Casey MD   Tirzepatide 10 MG/0.5ML solution auto-injector Inject 10 mg under the skin into the appropriate area as directed 1 (One) Time Per Week.    Mercedes Casey MD     I have utilized all available immediate resources to obtain, update, or review the patient's current medications (including all prescriptions, over-the-counter products, herbals, cannabis/cannabidiol products, and vitamin/mineral/dietary (nutritional) supplements).    Objective     Vital Signs: /80   Pulse (!)  "46   Temp 97.9 °F (36.6 °C) (Oral)   Resp 14   Ht 185.4 cm (73\")   Wt 108 kg (238 lb)   SpO2 99%   BMI 31.40 kg/m²   Physical Exam  Vitals and nursing note reviewed.   Constitutional:       General: He is not in acute distress.     Appearance: Normal appearance. He is not ill-appearing or toxic-appearing.   HENT:      Right Ear: Tympanic membrane normal.      Left Ear: Tympanic membrane normal.      Nose: Nose normal.      Mouth/Throat:      Mouth: Mucous membranes are moist.      Pharynx: Oropharynx is clear.   Eyes:      Pupils: Pupils are equal, round, and reactive to light.   Cardiovascular:      Rate and Rhythm: Normal rate and regular rhythm.      Pulses: Normal pulses.   Pulmonary:      Effort: Pulmonary effort is normal.      Breath sounds: Normal breath sounds.   Abdominal:      General: Abdomen is flat. Bowel sounds are normal.      Palpations: Abdomen is soft.   Musculoskeletal:      Cervical back: Normal range of motion.      Left lower leg: Edema present.   Skin:     General: Skin is warm and dry.      Capillary Refill: Capillary refill takes less than 2 seconds.   Neurological:      Mental Status: He is alert.      Motor: Weakness present.      Gait: Gait abnormal.      Comments: Chronic left leg weakness          Results Reviewed:  Lab Results (last 24 hours)       Procedure Component Value Units Date/Time    Potassium [[id number]]  (Abnormal) Collected: 03/17/25 1055    Specimen: Blood Updated: 03/17/25 1242     Potassium 2.5 mmol/L     Osmolality, Urine - Urine, Clean Catch [[id number]]  (Normal) Collected: 03/17/25 1047    Specimen: Urine, Clean Catch Updated: 03/17/25 1151     Osmolality, Urine 173 mOsm/kg     Osmolality, Serum [[id number]]  (Normal) Collected: 03/17/25 1055    Specimen: Blood Updated: 03/17/25 1147     Osmolality 292 mOsm/kg     Potassium, Urine, Random - Urine, Clean Catch [[id number]] Collected: 03/17/25 1047    Specimen: Urine, Clean Catch Updated: 03/17/25 1126     " Potassium, Urine 9.7 mmol/L     Narrative:      Reference intervals for random urine have not been established.  Clinical usage is dependent upon physician's interpretation in combination with other laboratory tests.       Magnesium [[id number]]  (Normal) Collected: 03/17/25 0821    Specimen: Blood Updated: 03/17/25 1052     Magnesium 2.4 mg/dL           Imaging Results (Last 24 Hours)       ** No results found for the last 24 hours. **            Assessment / Plan   Assessment:   Active Hospital Problems    Diagnosis     **Hypokalemia     Foraminal stenosis of lumbar region, severe     Bradycardia     Lumbar radiculopathy        Treatment Plan  The patient will be admitted to Dr. Mcfadden service here at [facility].     1.  Hypokalemia-acute on chronic, potassium preoperatively on 3/11 was 2.2, p.o. was given preoperatively only improved to 2.3, patient has been given 10M EQ IV x 1 and 40 p.o. x 1 will initiate an additional 40 p.o. x 1 reevaluate labs at 1400 and initiate protocol.    2.  Foraminal stenosis/lumbar radiculopathy-assist patient rescheduling surgical intervention as soon as possible, continue home Percocet, Narcan and bowel regimen in place.    3.  Bradycardia-chronic asymptomatic, will initiate cardiac telemetry to continue to monitor with electrolyte imbalance.    VTE prophylaxis with SCDs  Labs in a.m.    Medical Decision Making  1 acute on chronic, high complexity, unchanged  3 chronic, moderate complexity, stable  Number and Complexity of problems: 4  Differential Diagnosis: None    Conditions and Status        Condition is unchanged.     MDM Data  External documents reviewed: Review of orthopedic surgery office notes and history and physical.  Cardiac tracing (EKG, telemetry) interpretation: 3/17/2025 EKG personal review sinus bradycardia, atrial rate of 44, ventricular rate of 44, QTc 448.  Radiology interpretation: None  Labs reviewed: 3/17/2025 BMP, CBC pending  Any tests that were  considered but not ordered: None     Decision rules/scores evaluated (example [id number], Wells, etc): None     Discussed with: Dr. Mcfadden, patient and his wife Candi     Care Planning  Shared decision making: Dr. Mcfadden, patient and his wife Candi   Code status and discussions: Full code per patient    Disposition  Social Determinants of Health that impact treatment or disposition: None  Estimated length of stay is 1-2 days    I confirmed that the patient's advanced care plan is present, code status is documented, and a surrogate decision maker is listed in the patient's medical record.     The patient's surrogate decision maker is his wife Candi.     The patient was seen and examined by me on the at 1136.    Electronically signed by JESENIA Kumar, 03/17/25, 12:54 CDT.               Electronically signed by Jono Mcfadden DO at 03/17/25 1650       Facility-Administered Medications as of 3/18/2025   Medication Dose Route Frequency Provider Last Rate Last Admin    [COMPLETED] oxyCODONE ER (oxyCONTIN) 12 hr tablet 20 mg  20 mg Oral Once PAWEL Iyer MD   20 mg at 03/17/25 0819    [COMPLETED] pantoprazole (PROTONIX) EC tablet 40 mg  40 mg Oral Once Sterling Bagley MD   40 mg at 03/17/25 2118    [COMPLETED] potassium chloride (KLOR-CON M20) CR tablet 40 mEq  40 mEq Oral Q4H Jono Mcfadden DO   40 mEq at 03/18/25 0310    [COMPLETED] potassium chloride (MICRO-K/KLOR-CON) CR capsule  40 mEq Oral Once Mannie Zaragoza MD   40 mEq at 03/17/25 1107    [COMPLETED] potassium chloride (MICRO-K/KLOR-CON) CR capsule  40 mEq Oral Once Ana Manning APRN   40 mEq at 03/17/25 1322    [COMPLETED] potassium chloride (MICRO-K/KLOR-CON) CR capsule  40 mEq Oral Q4H Ana Manning APRN   40 mEq at 03/18/25 1227    [COMPLETED] potassium chloride 10 mEq in 100 mL IVPB  10 mEq Intravenous Q1H Mannie Zaragoza MD   Stopped at 03/17/25 1831     Orders (all)        Start     Ordered    03/18/25 1531  Discharge patient   Once         03/18/25 1530    03/18/25 1531  Discontinue IV  Once,   Status:  Canceled         03/18/25 1530    03/18/25 1531  Discontinue Telemetry  Once,   Status:  Canceled         03/18/25 1530    03/18/25 1451  Potassium  STAT         03/18/25 1450    03/18/25 1400  Potassium  Once,   Status:  Canceled         03/18/25 0825    03/18/25 0915  potassium chloride (MICRO-K/KLOR-CON) CR capsule  Every 4 Hours         03/18/25 0825    03/18/25 0702  Potassium  Timed,   Status:  Canceled         03/17/25 1802    03/18/25 0642  Potassium  STAT,   Status:  Canceled         03/18/25 0641    03/18/25 0600  Magnesium  Daily,   Status:  Canceled       03/17/25 1222    03/18/25 0600  pantoprazole (PROTONIX) EC tablet 40 mg  Every Early Morning,   Status:  Discontinued         03/17/25 1235    03/18/25 0600  Comprehensive Metabolic Panel  Morning Draw,   Status:  Canceled         03/17/25 1448    03/18/25 0600  Hemoglobin A1c  Morning Draw,   Status:  Canceled         03/17/25 1448    03/18/25 0600  Lipid Panel  Morning Draw,   Status:  Canceled         03/17/25 1448    03/18/25 0600  TSH  Morning Draw,   Status:  Canceled         03/17/25 1448    03/18/25 0600  Basic Metabolic Panel  Morning Draw         03/17/25 1651    03/18/25 0000  hydroCHLOROthiazide 25 MG tablet  Daily         03/18/25 1530    03/18/25 0000  Discharge Instructions        Comments: 1.  Patient was instructed to return to medical attention for any new or worsening chest pain, shortness of breath or excessive leg cramping.  2.  Patient was instructed to follow-up with PCP in 1 week.  3.  Patient was instructed that Dr. Iyer, orthopedic surgery's office will call him with new date and time for surgical intervention.  4.  Patient was instructed to discontinue Lasix and to make sure that he takes his p.o. potassium daily and to discuss further with PCP.    03/18/25 1530    03/18/25 0000  Activity as Tolerated         03/18/25 1530    03/18/25 0000   Discharge Follow-up with Specified Provider: Dr. Iyer         03/18/25 1530    03/18/25 0000  Discharge Follow-up with PCP         03/18/25 1530    03/18/25 0000  Discharge Follow-up with Specialty:         03/18/25 1530    03/17/25 2130  pantoprazole (PROTONIX) EC tablet 40 mg  Once         03/17/25 2043    03/17/25 2100  sodium chloride 0.9 % flush 10 mL  Every 12 Hours Scheduled,   Status:  Discontinued         03/17/25 1448    03/17/25 1900  potassium chloride (KLOR-CON M20) CR tablet 40 mEq  Every 4 Hours         03/17/25 1802    03/17/25 1800  Oral Care  2 Times Daily,   Status:  Canceled       03/17/25 1448    03/17/25 1600  magnesium oxide (MAG-OX) tablet 400 mg  Daily,   Status:  Discontinued         03/17/25 1235    03/17/25 1600  multivitamin with minerals 1 tablet  Daily,   Status:  Discontinued         03/17/25 1235    03/17/25 1600  Vital Signs  Every 4 Hours,   Status:  Canceled       03/17/25 1448    03/17/25 1545  cetirizine (zyrTEC) tablet 10 mg  Daily,   Status:  Discontinued         03/17/25 1457    03/17/25 1500  Strict Intake & Output  Every Hour,   Status:  Canceled       03/17/25 1448    03/17/25 1449  Weigh Patient  Once,   Status:  Canceled         03/17/25 1448    03/17/25 1449  Insert Peripheral IV  Once         03/17/25 1448    03/17/25 1449  Saline Lock & Maintain IV Access  Continuous,   Status:  Canceled         03/17/25 1448    03/17/25 1449  Place Sequential Compression Device  Once         03/17/25 1448    03/17/25 1449  Maintain Sequential Compression Device  Continuous,   Status:  Canceled         03/17/25 1448    03/17/25 1449  Continuous Cardiac Monitoring  Continuous,   Status:  Canceled        Comments: Follow Standing Orders As Outlined in Process Instructions (Open Order Report to View Full Instructions)    03/17/25 1448    03/17/25 1449  Maintain IV Access  Continuous,   Status:  Canceled         03/17/25 1448    03/17/25 1449  Telemetry - Place Orders & Notify Provider  "of Results When Patient Experiences Acute Chest Pain, Dysrhythmia or Respiratory Distress  Continuous,   Status:  Canceled        Comments: Open Order Report to View Parameters Requiring Provider Notification    03/17/25 1448    03/17/25 1449  May Be Off Telemetry for Tests  Continuous,   Status:  Canceled         03/17/25 1448    03/17/25 1449  Diet: Cardiac; Healthy Heart (2-3 Na+); Fluid Consistency: Thin (IDDSI 0)  Diet Effective Now,   Status:  Canceled         03/17/25 1448    03/17/25 1448  sennosides-docusate (PERICOLACE) 8.6-50 MG per tablet 2 tablet  2 Times Daily PRN,   Status:  Discontinued        Placed in \"And\" Linked Group    03/17/25 1448    03/17/25 1448  polyethylene glycol (MIRALAX) packet 17 g  Daily PRN,   Status:  Discontinued        Placed in \"And\" Linked Group    03/17/25 1448    03/17/25 1448  bisacodyl (DULCOLAX) EC tablet 5 mg  Daily PRN,   Status:  Discontinued        Placed in \"And\" Linked Group    03/17/25 1448    03/17/25 1448  bisacodyl (DULCOLAX) suppository 10 mg  Daily PRN,   Status:  Discontinued        Placed in \"And\" Linked Group    03/17/25 1448    03/17/25 1448  ondansetron ODT (ZOFRAN-ODT) disintegrating tablet 4 mg  Every 6 Hours PRN,   Status:  Discontinued        Placed in \"Or\" Linked Group    03/17/25 1448    03/17/25 1448  ondansetron (ZOFRAN) injection 4 mg  Every 6 Hours PRN,   Status:  Discontinued        Placed in \"Or\" Linked Group    03/17/25 1448    03/17/25 1448  sodium chloride 0.9 % flush 10 mL  As Needed,   Status:  Discontinued         03/17/25 1448    03/17/25 1448  sodium chloride 0.9 % infusion 40 mL  As Needed,   Status:  Discontinued         03/17/25 1448    03/17/25 1448  Up With Assistance  As Needed,   Status:  Canceled       03/17/25 1448    03/17/25 1448  Oxygen Therapy- Nasal Cannula; Titrate 1-6 LPM Per SpO2; 90 - 95%  Continuous PRN,   Status:  Canceled       03/17/25 1448    03/17/25 1400  Potassium  Once         03/17/25 1222    03/17/25 1400  " "CBC & Differential  Timed         03/17/25 1253    03/17/25 1400  Potassium  Timed         03/17/25 1253    03/17/25 1400  CBC Auto Differential  PROCEDURE ONCE         03/17/25 1253    03/17/25 1315  minoxidil (LONITEN) tablet 2.5 mg  Daily,   Status:  Discontinued         03/17/25 1235    03/17/25 1315  tamsulosin (FLOMAX) 24 hr capsule 0.8 mg  Daily,   Status:  Discontinued         03/17/25 1235    03/17/25 1301  albuterol (PROVENTIL) nebulizer solution 0.083% 2.5 mg/3mL  Every 6 Hours PRN,   Status:  Discontinued         03/17/25 1302    03/17/25 1300  arformoterol (BROVANA) nebulizer solution 15 mcg  2 Times Daily - RT,   Status:  Discontinued        Placed in \"And\" Linked Group    03/17/25 1235    03/17/25 1300  amLODIPine (NORVASC) tablet 10 mg  Daily,   Status:  Discontinued         03/17/25 1235    03/17/25 1300  citalopram (CeleXA) tablet 40 mg  Daily,   Status:  Discontinued         03/17/25 1235    03/17/25 1300  doxycycline (MONODOX) capsule 100 mg  Every 12 Hours Scheduled,   Status:  Discontinued         03/17/25 1235    03/17/25 1253  CBC & Differential  Once,   Status:  Canceled         03/17/25 1252    03/17/25 1253  CBC Auto Differential  PROCEDURE ONCE,   Status:  Canceled         03/17/25 1252    03/17/25 1251  budesonide (PULMICORT) nebulizer solution 0.5 mg  2 Times Daily - RT,   Status:  Discontinued        Placed in \"And\" Linked Group    03/17/25 1235    03/17/25 1251  furosemide (LASIX) tablet 20 mg  Daily,   Status:  Discontinued         03/17/25 1235    03/17/25 1251  hydroCHLOROthiazide tablet 25 mg  Daily,   Status:  Discontinued         03/17/25 1235    03/17/25 1237  potassium chloride (MICRO-K/KLOR-CON) CR capsule  Once         03/17/25 1221    03/17/25 1237  Code Status and Medical Interventions: CPR (Attempt to Resuscitate); Full Support  Continuous,   Status:  Canceled         03/17/25 1237    03/17/25 1235  oxyCODONE-acetaminophen (PERCOCET)  MG per tablet 1 tablet  Every 6 " Hours PRN,   Status:  Discontinued         03/17/25 1235    03/17/25 1232  albuterol sulfate HFA (PROVENTIL HFA;VENTOLIN HFA;PROAIR HFA) inhaler 2 puff  Every 6 Hours PRN,   Status:  Discontinued         03/17/25 1235    03/17/25 1222  Potassium Replacement - Follow Nurse / BPA Driven Protocol  As Needed,   Status:  Discontinued         03/17/25 1222    03/17/25 1130  Cardiac Monitoring  Continuous,   Status:  Canceled        Comments: Follow Standing Orders As Outlined in Process Instructions (Open Order Report to View Full Instructions)    03/17/25 1129    03/17/25 1129  Initiate Observation Status  Once         03/17/25 1129    03/17/25 1057  potassium chloride 10 mEq in 100 mL IVPB  Every 1 Hour         03/17/25 1041    03/17/25 1055  potassium chloride 20 mEq in 50 mL IVPB  Once,   Status:  Discontinued         03/17/25 1039    03/17/25 1055  potassium chloride (MICRO-K/KLOR-CON) CR capsule  Once         03/17/25 1039    03/17/25 1044  Osmolality, Serum  STAT         03/17/25 1043    03/17/25 1044  Osmolality, Urine - Urine, Clean Catch  Once         03/17/25 1043    03/17/25 1044  Potassium, Urine, Random - Urine, Clean Catch  Once         03/17/25 1043    03/17/25 1039  Magnesium  Once         03/17/25 1039    03/17/25 1039  ECG 12 Lead Pre-Op / Pre-Procedure  Once         03/17/25 1039    03/17/25 0000  ECG Scan  Once         03/17/25 0000    03/17/25 0000  Telemetry Scan  Once         03/17/25 0000    --  loratadine (CLARITIN) 10 MG tablet  Daily         03/17/25 1354    --  hydroCHLOROthiazide 50 MG tablet  2 Times Daily,   Status:  Discontinued         03/17/25 1355    --  finasteride (PROPECIA) 1 MG tablet  Daily         03/17/25 1359                  Physician Progress Notes (last 72 hours)  Notes from 03/18/25 0832 through 03/21/25 0832   No notes of this type exist for this encounter.       Consult Notes (all)    No notes of this type exist for this encounter.          Discharge Summary        ,  JESENIA Perez at 03/18/25 1114       Attestation signed by Jono Mcfadden DO at 03/18/25 1812    I performed a substantive part of the MDM during the patient’s E/M visit. I personally evaluated   and examined the patient. I personally made or approved the documented management plan and acknowledge its risk   of complications.   (Independent Interpretation) My (EKG/X-Ray/US/CT) interpretation - none  (Discussion) Management/test interpretation discussed with JESENIA Kumar    Patient seen and evaluated earlier this morning.  He was feeling well and like his normal self.  He initially presented to the hospital for surgery yesterday but was canceled due to significantly low potassium.  We have continued aggressive replacement over the last 24 hours and his potassium is now 3.6.  We discharged home today for outpatient follow-up.      Electronically signed by Jono Mcfadden DO, 3/18/2025, 18:11 CDT.                        [facility]  DISCHARGE SUMMARY       Date of Admission: 3/17/2025  Date of Discharge:  3/18/2025  Primary Care Physician: Shira Worthington APRN    Presenting Problem/History of Present Illness:  Abnormal lab    Final Discharge Diagnoses:  Active Hospital Problems    Diagnosis     **Hypokalemia     Foraminal stenosis of lumbar region, severe     Bradycardia     Lumbar radiculopathy        Consults: None    Procedures Performed: None    Pertinent Test Results:   Results for orders placed in visit on 11/25/19    Adult Transthoracic Echo Complete W/ Cont if Necessary Per Protocol    Interpretation Summary  · Left ventricular wall thickness is consistent with borderline concentric hypertrophy.  · Left atrial cavity size is borderline dilated.      Imaging Results (All)       None          LAB RESULTS:      Lab 03/17/25  1448   WBC 4.98   HEMOGLOBIN 11.8*   HEMATOCRIT 35.1*   PLATELETS 171   NEUTROS ABS 2.89   IMMATURE GRANS (ABS) 0.02   LYMPHS  ABS 1.51   MONOS ABS 0.33   EOS ABS 0.20   MCV 86.0         Lab 03/18/25  1454 03/18/25  0732 03/17/25  1448 03/17/25  1055 03/17/25  0821   SODIUM  --  145  --   --  142   POTASSIUM 3.6 3.1* 2.7* 2.5* 2.3*   CHLORIDE  --  108*  --   --  104   CO2  --  26.0  --   --  29.0   ANION GAP  --  11.0  --   --  9.0   BUN  --  9  --   --  8   CREATININE  --  0.75*  --   --  0.70*   EGFR  --  104.6  --   --  106.8   GLUCOSE  --  96  --   --  86   CALCIUM  --  8.5*  --   --  8.6   MAGNESIUM  --  2.2  --   --  2.4                         Lab 03/17/25  0821   ABO TYPING B   RH TYPING Negative   ANTIBODY SCREEN Negative         Brief Urine Lab Results  (Last result in the past 365 days)        Color   Clarity   Blood   Leuk Est   Nitrite   Protein   CREAT   Urine HCG        03/11/25 1217 Yellow   Clear   Negative   Negative   Negative   Negative                 Microbiology Results (last 10 days)       ** No results found for the last 240 hours. **          Microbiology Results (last 10 days)       ** No results found for the last 240 hours. **             Documented weights    03/17/25 1031 03/17/25 1257   Weight: 108 kg (238 lb) 107 kg (236 lb 12.8 oz)        Hospital Course: Kurtis Valladares is a 59-year-old male with a past medical history of acute bronchitis, GERD, chronic stable bradycardia, enlarged prostate, hypertension, LUCI that patient states has resolved, left leg radiculopathy and symptoms consistent with neurogenic claudication.  Patient was scheduled for lumbar radiculopathy with Dr. Ortiz today due to severe foraminal stenosis however preoperative laboratory data revealed a potassium of 2.2, patient was given p.o. potassium upon arrival and subsequent testing revealed potassium of 2.3.  Spinal surgery has been canceled for today hospital service was requested for admission and treatment of hypokalemia.     Patient was placed on potassium protocol, this morning potassium 3.1, patient was given an additional 40 mEq  "every 4 x 2 doses repeat potassium 3.6.  Patient had no overnight arrhythmia, maintained a baseline stable bradycardia at 45-55.  Patient was instructed to discontinue Lasix, to make sure that he takes his daily potassium and to discuss further with PCP.    Dr. Iyer, orthopedic surgery office will call him with the new date and time for his surgical intervention.    All patient's questions were answered to the best my ability and he is in agreement for discharge home today.  Patient has reached the maximum benefit of hospitalization and is stable for discharge  Patient has been evaluated today 03/18/25 and is stable for discharge.     Physical Exam on Discharge:  /86 (BP Location: Left arm, Patient Position: Lying)   Pulse 56   Temp 97.8 °F (36.6 °C) (Oral)   Resp 18   Ht 185.4 cm (73\")   Wt 107 kg (236 lb 12.8 oz)   SpO2 98%   BMI 31.24 kg/m²   Physical Exam  Vitals and nursing note reviewed.   Constitutional:       General: He is not in acute distress.     Appearance: Normal appearance. He is not ill-appearing or toxic-appearing.   HENT:      Right Ear: Tympanic membrane normal.      Left Ear: Tympanic membrane normal.      Nose: Nose normal.      Mouth/Throat:      Mouth: Mucous membranes are moist.      Pharynx: Oropharynx is clear.   Eyes:      Pupils: Pupils are equal, round, and reactive to light.   Cardiovascular:      Rate and Rhythm: Normal rate and regular rhythm.      Pulses: Normal pulses.   Pulmonary:      Effort: Pulmonary effort is normal.      Breath sounds: Normal breath sounds.   Abdominal:      General: Abdomen is flat. Bowel sounds are normal.      Palpations: Abdomen is soft.   Musculoskeletal:      Cervical back: Normal range of motion.      Left lower leg: Edema present.   Skin:     General: Skin is warm and dry.      Capillary Refill: Capillary refill takes less than 2 seconds.   Neurological:      Mental Status: He is alert.      Motor: Weakness present.      Gait: Gait " abnormal.      Comments: Chronic left leg weakness        Condition on Discharge: Able for discharge home    Discharge Disposition:  Home or Self Care    Discharge Medications:     Discharge Medications        Changes to Medications        Instructions Start Date   hydroCHLOROthiazide 25 MG tablet  What changed:   how much to take  Another medication with the same name was removed. Continue taking this medication, and follow the directions you see here.   50 mg, Oral, Daily             Continue These Medications        Instructions Start Date   Advair Diskus 100-50 MCG/ACT DISKUS  Generic drug: Fluticasone-Salmeterol   1 puff, Inhalation, 2 Times Daily - RT      albuterol sulfate  (90 Base) MCG/ACT inhaler  Commonly known as: PROVENTIL HFA;VENTOLIN HFA;PROAIR HFA   2 puffs, Inhalation, As Needed      citalopram 40 MG tablet  Commonly known as: CeleXA   40 mg, Oral, Daily      diclofenac 75 MG EC tablet  Commonly known as: VOLTAREN   150 mg, Oral, Daily      doxycycline 100 MG tablet  Commonly known as: VIBRAMYICN   200 mg, Oral, Daily      finasteride 1 MG tablet  Commonly known as: PROPECIA   1 mg, Oral, Daily      loratadine 10 MG tablet  Commonly known as: CLARITIN   10 mg, Oral, Daily      magnesium oxide 400 MG tablet  Commonly known as: MAG-OX   400 mg, Oral, Daily      minoxidil 2.5 MG tablet  Commonly known as: LONITEN   1 tablet, Oral, Daily      multivitamin with minerals tablet tablet   1 tablet, Oral, Daily      omeprazole 20 MG capsule  Commonly known as: priLOSEC   20 mg, Oral, Daily      oxyCODONE-acetaminophen  MG per tablet  Commonly known as: PERCOCET   1 tablet, Oral, Every 6 Hours PRN      Potassium Chloride 40 MEQ/15ML (20%) solution   40 mEq, Oral, Daily      tamsulosin 0.4 MG capsule 24 hr capsule  Commonly known as: FLOMAX   1 capsule, 2 Times Daily      Tirzepatide 10 MG/0.5ML solution auto-injector   10 mg, Subcutaneous, Weekly, Sundays             Stop These Medications       furosemide 20 MG tablet  Commonly known as: LASIX              Discharge Diet: Advance diet as tolerated    Activity at Discharge:   Activity Instructions       Activity as Tolerated              Discharge Instructions:   1.  Patient was instructed to return to medical attention for any new or worsening chest pain, shortness of breath or excessive leg cramping.  2.  Patient was instructed to follow-up with PCP in 1 week.  3.  Patient was instructed that Dr. Iyer, orthopedic surgery's office will call him with new date and time for surgical intervention.  4.  Patient was instructed to discontinue Lasix and to make sure that he takes his p.o. potassium daily and to discuss further with PCP.    Follow-up Appointments:   Future Appointments   Date Time Provider Department Center   9/11/2025 11:30 AM Anali Leon APRN [facility]       Test Results Pending at Discharge: None    Electronically signed by APRYL Kumar, 03/18/25, 15:30 CDT.    Time: 5 minutes.          Electronically signed by Jono Mcfadden DO at 03/18/25 2100

## 2025-04-21 ENCOUNTER — PRE-ADMISSION TESTING (OUTPATIENT)
Dept: PREADMISSION TESTING | Facility: HOSPITAL | Age: 60
End: 2025-04-21
Payer: MEDICAID

## 2025-04-21 VITALS
RESPIRATION RATE: 16 BRPM | HEIGHT: 73 IN | BODY MASS INDEX: 31.56 KG/M2 | DIASTOLIC BLOOD PRESSURE: 78 MMHG | OXYGEN SATURATION: 100 % | HEART RATE: 74 BPM | WEIGHT: 238.1 LBS | SYSTOLIC BLOOD PRESSURE: 135 MMHG

## 2025-04-21 LAB
ALBUMIN SERPL-MCNC: 3.7 G/DL (ref 3.5–5.2)
ALBUMIN/GLOB SERPL: 1.5 G/DL
ALP SERPL-CCNC: 81 U/L (ref 39–117)
ALT SERPL W P-5'-P-CCNC: 12 U/L (ref 1–41)
ANION GAP SERPL CALCULATED.3IONS-SCNC: 9 MMOL/L (ref 5–15)
APTT PPP: 29.3 SECONDS (ref 24.5–36)
AST SERPL-CCNC: 18 U/L (ref 1–40)
BILIRUB SERPL-MCNC: 0.9 MG/DL (ref 0–1.2)
BILIRUB UR QL STRIP: NEGATIVE
BUN SERPL-MCNC: 10 MG/DL (ref 6–20)
BUN/CREAT SERPL: 12.8 (ref 7–25)
CALCIUM SPEC-SCNC: 8.6 MG/DL (ref 8.6–10.5)
CHLORIDE SERPL-SCNC: 105 MMOL/L (ref 98–107)
CLARITY UR: ABNORMAL
CO2 SERPL-SCNC: 27 MMOL/L (ref 22–29)
COLOR UR: YELLOW
CREAT SERPL-MCNC: 0.78 MG/DL (ref 0.76–1.27)
DEPRECATED RDW RBC AUTO: 43.8 FL (ref 37–54)
EGFRCR SERPLBLD CKD-EPI 2021: 103.4 ML/MIN/1.73
ERYTHROCYTE [DISTWIDTH] IN BLOOD BY AUTOMATED COUNT: 13.5 % (ref 12.3–15.4)
GLOBULIN UR ELPH-MCNC: 2.5 GM/DL
GLUCOSE SERPL-MCNC: 78 MG/DL (ref 65–99)
GLUCOSE UR STRIP-MCNC: NEGATIVE MG/DL
HCT VFR BLD AUTO: 35.8 % (ref 37.5–51)
HGB BLD-MCNC: 12 G/DL (ref 13–17.7)
HGB UR QL STRIP.AUTO: NEGATIVE
INR PPP: 1.01 (ref 0.91–1.09)
KETONES UR QL STRIP: NEGATIVE
LEUKOCYTE ESTERASE UR QL STRIP.AUTO: NEGATIVE
MCH RBC QN AUTO: 29.6 PG (ref 26.6–33)
MCHC RBC AUTO-ENTMCNC: 33.5 G/DL (ref 31.5–35.7)
MCV RBC AUTO: 88.4 FL (ref 79–97)
NITRITE UR QL STRIP: NEGATIVE
PH UR STRIP.AUTO: 8.5 [PH] (ref 5–8)
PLATELET # BLD AUTO: 175 10*3/MM3 (ref 140–450)
PMV BLD AUTO: 10 FL (ref 6–12)
POTASSIUM SERPL-SCNC: 3.4 MMOL/L (ref 3.5–5.2)
PROT SERPL-MCNC: 6.2 G/DL (ref 6–8.5)
PROT UR QL STRIP: NEGATIVE
PROTHROMBIN TIME: 13.8 SECONDS (ref 11.8–14.8)
RBC # BLD AUTO: 4.05 10*6/MM3 (ref 4.14–5.8)
SODIUM SERPL-SCNC: 141 MMOL/L (ref 136–145)
SP GR UR STRIP: 1.02 (ref 1–1.03)
UROBILINOGEN UR QL STRIP: ABNORMAL
WBC NRBC COR # BLD AUTO: 6.23 10*3/MM3 (ref 3.4–10.8)

## 2025-04-21 PROCEDURE — 85610 PROTHROMBIN TIME: CPT

## 2025-04-21 PROCEDURE — 85027 COMPLETE CBC AUTOMATED: CPT

## 2025-04-21 PROCEDURE — 81003 URINALYSIS AUTO W/O SCOPE: CPT

## 2025-04-21 PROCEDURE — 36415 COLL VENOUS BLD VENIPUNCTURE: CPT

## 2025-04-21 PROCEDURE — 80053 COMPREHEN METABOLIC PANEL: CPT

## 2025-04-21 PROCEDURE — 85730 THROMBOPLASTIN TIME PARTIAL: CPT

## 2025-04-21 RX ORDER — FLUTICASONE PROPIONATE 50 MCG
2 SPRAY, SUSPENSION (ML) NASAL DAILY
Status: ON HOLD | COMMUNITY

## 2025-04-21 RX ORDER — MINOCYCLINE HYDROCHLORIDE 100 MG/1
100 CAPSULE ORAL 2 TIMES DAILY
Status: ON HOLD | COMMUNITY

## 2025-04-21 NOTE — DISCHARGE INSTRUCTIONS

## 2025-04-28 ENCOUNTER — HOSPITAL ENCOUNTER (INPATIENT)
Facility: HOSPITAL | Age: 60
LOS: 5 days | Discharge: HOME | DRG: 427 | End: 2025-05-03
Attending: ORTHOPAEDIC SURGERY | Admitting: ORTHOPAEDIC SURGERY
Payer: MEDICAID

## 2025-04-28 ENCOUNTER — ANESTHESIA EVENT (OUTPATIENT)
Dept: PERIOP | Facility: HOSPITAL | Age: 60
DRG: 427 | End: 2025-04-28
Payer: MEDICAID

## 2025-04-28 ENCOUNTER — APPOINTMENT (OUTPATIENT)
Dept: GENERAL RADIOLOGY | Facility: HOSPITAL | Age: 60
DRG: 427 | End: 2025-04-28
Payer: MEDICAID

## 2025-04-28 ENCOUNTER — ANESTHESIA (OUTPATIENT)
Dept: PERIOP | Facility: HOSPITAL | Age: 60
DRG: 427 | End: 2025-04-28
Payer: MEDICAID

## 2025-04-28 DIAGNOSIS — Z74.09 IMPAIRED MOBILITY: Primary | ICD-10-CM

## 2025-04-28 PROBLEM — M47.816 LUMBAR SPONDYLOSIS: Status: ACTIVE | Noted: 2025-04-28

## 2025-04-28 PROBLEM — F41.1 GAD (GENERALIZED ANXIETY DISORDER): Status: ACTIVE | Noted: 2025-04-28

## 2025-04-28 PROBLEM — K59.03 DRUG-INDUCED CONSTIPATION: Status: ACTIVE | Noted: 2025-04-28

## 2025-04-28 PROBLEM — M51.9 LUMBOSACRAL DISC DISEASE: Status: ACTIVE | Noted: 2025-04-28

## 2025-04-28 PROBLEM — N40.1 BPH WITH OBSTRUCTION/LOWER URINARY TRACT SYMPTOMS: Status: ACTIVE | Noted: 2025-04-28

## 2025-04-28 PROBLEM — N13.8 BPH WITH OBSTRUCTION/LOWER URINARY TRACT SYMPTOMS: Status: ACTIVE | Noted: 2025-04-28

## 2025-04-28 PROBLEM — M54.17 LUMBOSACRAL RADICULOPATHY: Status: ACTIVE | Noted: 2025-04-28

## 2025-04-28 LAB
ABO GROUP BLD: NORMAL
BLD GP AB SCN SERPL QL: NEGATIVE
RH BLD: NEGATIVE
T&S EXPIRATION DATE: NORMAL

## 2025-04-28 PROCEDURE — 97165 OT EVAL LOW COMPLEX 30 MIN: CPT | Performed by: OCCUPATIONAL THERAPIST

## 2025-04-28 PROCEDURE — 25010000002 HYDROMORPHONE 1 MG/ML SOLUTION: Performed by: NURSE ANESTHETIST, CERTIFIED REGISTERED

## 2025-04-28 PROCEDURE — 22558 ARTHRD ANT NTRBD MIN DSC LUM: CPT | Performed by: SURGERY

## 2025-04-28 PROCEDURE — C1713 ANCHOR/SCREW BN/BN,TIS/BN: HCPCS | Performed by: ORTHOPAEDIC SURGERY

## 2025-04-28 PROCEDURE — 25010000002 VASOPRESSIN 20 UNIT/ML SOLUTION: Performed by: NURSE ANESTHETIST, CERTIFIED REGISTERED

## 2025-04-28 PROCEDURE — 0SG30A0 FUSION OF LUMBOSACRAL JOINT WITH INTERBODY FUSION DEVICE, ANTERIOR APPROACH, ANTERIOR COLUMN, OPEN APPROACH: ICD-10-PCS | Performed by: ORTHOPAEDIC SURGERY

## 2025-04-28 PROCEDURE — 97161 PT EVAL LOW COMPLEX 20 MIN: CPT | Performed by: PHYSICAL THERAPIST

## 2025-04-28 PROCEDURE — 25010000002 FENTANYL CITRATE (PF) 250 MCG/5ML SOLUTION: Performed by: NURSE ANESTHETIST, CERTIFIED REGISTERED

## 2025-04-28 PROCEDURE — 94761 N-INVAS EAR/PLS OXIMETRY MLT: CPT

## 2025-04-28 PROCEDURE — 72100 X-RAY EXAM L-S SPINE 2/3 VWS: CPT

## 2025-04-28 PROCEDURE — 0SB40ZZ EXCISION OF LUMBOSACRAL DISC, OPEN APPROACH: ICD-10-PCS | Performed by: ORTHOPAEDIC SURGERY

## 2025-04-28 PROCEDURE — 25010000002 LIDOCAINE PF 2% 2 % SOLUTION: Performed by: NURSE ANESTHETIST, CERTIFIED REGISTERED

## 2025-04-28 PROCEDURE — 97605 NEG PRS WND THER DME<=50SQCM: CPT | Performed by: SURGERY

## 2025-04-28 PROCEDURE — 01NB0ZZ RELEASE LUMBAR NERVE, OPEN APPROACH: ICD-10-PCS | Performed by: ORTHOPAEDIC SURGERY

## 2025-04-28 PROCEDURE — 25010000002 PROPOFOL 10 MG/ML EMULSION: Performed by: NURSE ANESTHETIST, CERTIFIED REGISTERED

## 2025-04-28 PROCEDURE — 94799 UNLISTED PULMONARY SVC/PX: CPT

## 2025-04-28 PROCEDURE — 25810000003 LACTATED RINGERS PER 1000 ML: Performed by: ORTHOPAEDIC SURGERY

## 2025-04-28 PROCEDURE — 25010000002 GLYCOPYRROLATE 0.4 MG/2ML SOLUTION: Performed by: NURSE ANESTHETIST, CERTIFIED REGISTERED

## 2025-04-28 PROCEDURE — 74018 RADEX ABDOMEN 1 VIEW: CPT

## 2025-04-28 PROCEDURE — 94640 AIRWAY INHALATION TREATMENT: CPT

## 2025-04-28 PROCEDURE — 25010000002 CEFAZOLIN PER 500 MG: Performed by: ORTHOPAEDIC SURGERY

## 2025-04-28 PROCEDURE — 25010000002 SUGAMMADEX 200 MG/2ML SOLUTION: Performed by: NURSE ANESTHETIST, CERTIFIED REGISTERED

## 2025-04-28 PROCEDURE — 25810000003 SODIUM CHLORIDE 0.9 % SOLUTION: Performed by: ORTHOPAEDIC SURGERY

## 2025-04-28 PROCEDURE — C1765 ADHESION BARRIER: HCPCS | Performed by: ORTHOPAEDIC SURGERY

## 2025-04-28 PROCEDURE — 86850 RBC ANTIBODY SCREEN: CPT | Performed by: ORTHOPAEDIC SURGERY

## 2025-04-28 PROCEDURE — 22853 INSJ BIOMECHANICAL DEVICE: CPT | Performed by: SURGERY

## 2025-04-28 PROCEDURE — 22845 INSERT SPINE FIXATION DEVICE: CPT | Performed by: SURGERY

## 2025-04-28 PROCEDURE — 76000 FLUOROSCOPY <1 HR PHYS/QHP: CPT

## 2025-04-28 PROCEDURE — 86900 BLOOD TYPING SEROLOGIC ABO: CPT | Performed by: ORTHOPAEDIC SURGERY

## 2025-04-28 PROCEDURE — 86901 BLOOD TYPING SEROLOGIC RH(D): CPT | Performed by: ORTHOPAEDIC SURGERY

## 2025-04-28 DEVICE — IDENTITI ALIF SA SPACER, 7 X 42 X 30 MM, 20°
Type: IMPLANTABLE DEVICE | Site: ABDOMEN | Status: FUNCTIONAL
Brand: IDENTITI

## 2025-04-28 DEVICE — IDENTITI ALIF SA LATERAL SCREW, Ø5 X 30 MM
Type: IMPLANTABLE DEVICE | Site: ABDOMEN | Status: FUNCTIONAL
Brand: IDENTITI

## 2025-04-28 DEVICE — VERSAWRAP IS AN ABSORBABLE IMPLANT (DEVICE), DESIGNED TO SERVE AS AN INTERFACE BETWEEN TARGET TISSUES AND SURROUNDING TISSUES TO PROVIDE A NON-CONSTRICTING, PROTECTIVE ENCASEMENT. VERSAWRAP CONSISTS OF A CLEAR SHEET AND A WETTING SOLUTION. THE CLEAR SHEET IS A THIN MEMBRANE OF CROSSLINKED CALCIUM ALGINATE AND GLYCOSAMINOGLYCAN. VERSAWRAP SHEET IS EASY TO HANDLE, CONFORMABLE, AND IS DESIGNED FOR PLACEMENT UNDER, AROUND, OR OVER INJURED TISSUES AND/OR SURROUNDING TISSUES. VERSAWRAP SHEET IS SUPPLIED STERILE, NON-PYROGENIC, FOR SINGLE USE, IN DOUBLE PEEL POUCHES. THE VERSAWRAP SOLUTION IS APPLIED TO THE SHEET TO RENDER THE SHEET A GELATINOUS, TISSUE ADHERENT LAYER (GEL IN SITU). THE AQUEOUS CITRATE SOLUTION IS PROVIDED STERILE, NON-PYROGENIC, FOR SINGLE USE, IN A DROPPER, PACKAGED IN A DOUBLE PEEL POUCH.
Type: IMPLANTABLE DEVICE | Site: ABDOMEN | Status: FUNCTIONAL
Brand: VERSAWRAP

## 2025-04-28 DEVICE — 18MM SACRAL PLATE
Type: IMPLANTABLE DEVICE | Site: ABDOMEN | Status: FUNCTIONAL
Brand: ASPIDA

## 2025-04-28 DEVICE — HEMOST ABS SURGIFOAM SZ100 8X12 10MM: Type: IMPLANTABLE DEVICE | Site: SPINE LUMBAR | Status: FUNCTIONAL

## 2025-04-28 DEVICE — KT HEMOST ABS SURGIFOAM PORCN 1GRAM: Type: IMPLANTABLE DEVICE | Site: SPINE LUMBAR | Status: FUNCTIONAL

## 2025-04-28 DEVICE — IMPLANTABLE DEVICE: Type: IMPLANTABLE DEVICE | Site: ABDOMEN | Status: FUNCTIONAL

## 2025-04-28 DEVICE — LIGACLIP MCA MULTIPLE CLIP APPLIERS, 30 MEDIUM CLIPS
Type: IMPLANTABLE DEVICE | Site: SPINE LUMBAR | Status: FUNCTIONAL
Brand: LIGACLIP

## 2025-04-28 DEVICE — LIGACLIP MCA MULTIPLE CLIP APPLIERS, 20 SMALL CLIPS
Type: IMPLANTABLE DEVICE | Site: SPINE LUMBAR | Status: FUNCTIONAL
Brand: LIGACLIP

## 2025-04-28 DEVICE — ALLOGRFT CORT NMP FIBR FZD 11.1CC LG LNG STRL: Type: IMPLANTABLE DEVICE | Site: ABDOMEN | Status: FUNCTIONAL

## 2025-04-28 DEVICE — ALIF SA GRAFT BOLT, Ø8.5 X 30 MM
Type: IMPLANTABLE DEVICE | Site: ABDOMEN | Status: FUNCTIONAL
Brand: IDENTITI

## 2025-04-28 DEVICE — INCISIONLINE PLEDGET TFLN SFT LG: Type: IMPLANTABLE DEVICE | Site: SPINE LUMBAR | Status: FUNCTIONAL

## 2025-04-28 RX ORDER — ACETAMINOPHEN 500 MG
1000 TABLET ORAL ONCE
Status: COMPLETED | OUTPATIENT
Start: 2025-04-28 | End: 2025-04-28

## 2025-04-28 RX ORDER — AMOXICILLIN 250 MG
2 CAPSULE ORAL 2 TIMES DAILY
Status: DISCONTINUED | OUTPATIENT
Start: 2025-04-28 | End: 2025-05-03 | Stop reason: HOSPADM

## 2025-04-28 RX ORDER — METHOCARBAMOL 500 MG/1
1000 TABLET, FILM COATED ORAL 4 TIMES DAILY PRN
Status: DISCONTINUED | OUTPATIENT
Start: 2025-04-28 | End: 2025-05-03 | Stop reason: HOSPADM

## 2025-04-28 RX ORDER — MINOXIDIL 2.5 MG/1
5 TABLET ORAL DAILY
Status: DISCONTINUED | OUTPATIENT
Start: 2025-04-28 | End: 2025-05-03 | Stop reason: HOSPADM

## 2025-04-28 RX ORDER — SODIUM CHLORIDE 0.9 % (FLUSH) 0.9 %
10 SYRINGE (ML) INJECTION AS NEEDED
Status: DISCONTINUED | OUTPATIENT
Start: 2025-04-28 | End: 2025-04-28 | Stop reason: HOSPADM

## 2025-04-28 RX ORDER — SODIUM CHLORIDE 9 MG/ML
100 INJECTION, SOLUTION INTRAVENOUS CONTINUOUS
Status: DISPENSED | OUTPATIENT
Start: 2025-04-28 | End: 2025-04-29

## 2025-04-28 RX ORDER — IBUPROFEN 600 MG/1
600 TABLET, FILM COATED ORAL EVERY 6 HOURS PRN
Status: DISCONTINUED | OUTPATIENT
Start: 2025-04-28 | End: 2025-04-28 | Stop reason: HOSPADM

## 2025-04-28 RX ORDER — HYDROCHLOROTHIAZIDE 50 MG/1
50 TABLET ORAL 2 TIMES DAILY
COMMUNITY

## 2025-04-28 RX ORDER — SODIUM CHLORIDE 9 MG/ML
40 INJECTION, SOLUTION INTRAVENOUS AS NEEDED
Status: DISCONTINUED | OUTPATIENT
Start: 2025-04-28 | End: 2025-05-03 | Stop reason: HOSPADM

## 2025-04-28 RX ORDER — SODIUM CHLORIDE 0.9 % (FLUSH) 0.9 %
3-10 SYRINGE (ML) INJECTION AS NEEDED
Status: DISCONTINUED | OUTPATIENT
Start: 2025-04-28 | End: 2025-04-28 | Stop reason: HOSPADM

## 2025-04-28 RX ORDER — MINOCYCLINE HYDROCHLORIDE 50 MG/1
100 CAPSULE ORAL 2 TIMES DAILY
Status: DISCONTINUED | OUTPATIENT
Start: 2025-04-28 | End: 2025-05-03 | Stop reason: HOSPADM

## 2025-04-28 RX ORDER — ACETAMINOPHEN 160 MG/5ML
650 SOLUTION ORAL EVERY 4 HOURS PRN
Status: DISCONTINUED | OUTPATIENT
Start: 2025-04-28 | End: 2025-05-03 | Stop reason: HOSPADM

## 2025-04-28 RX ORDER — FENTANYL CITRATE 50 UG/ML
50 INJECTION, SOLUTION INTRAMUSCULAR; INTRAVENOUS
Status: DISCONTINUED | OUTPATIENT
Start: 2025-04-28 | End: 2025-04-28 | Stop reason: HOSPADM

## 2025-04-28 RX ORDER — SODIUM CHLORIDE 0.9 % (FLUSH) 0.9 %
3 SYRINGE (ML) INJECTION EVERY 12 HOURS SCHEDULED
Status: DISCONTINUED | OUTPATIENT
Start: 2025-04-28 | End: 2025-04-28 | Stop reason: HOSPADM

## 2025-04-28 RX ORDER — SODIUM CHLORIDE 0.9 % (FLUSH) 0.9 %
10 SYRINGE (ML) INJECTION EVERY 12 HOURS SCHEDULED
Status: DISCONTINUED | OUTPATIENT
Start: 2025-04-28 | End: 2025-04-28 | Stop reason: HOSPADM

## 2025-04-28 RX ORDER — FLUMAZENIL 0.1 MG/ML
0.2 INJECTION INTRAVENOUS AS NEEDED
Status: DISCONTINUED | OUTPATIENT
Start: 2025-04-28 | End: 2025-04-28 | Stop reason: HOSPADM

## 2025-04-28 RX ORDER — OXYCODONE AND ACETAMINOPHEN 10; 325 MG/1; MG/1
1 TABLET ORAL EVERY 4 HOURS PRN
Status: DISPENSED | OUTPATIENT
Start: 2025-04-28 | End: 2025-05-03

## 2025-04-28 RX ORDER — MIDAZOLAM HYDROCHLORIDE 2 MG/2ML
1 INJECTION, SOLUTION INTRAMUSCULAR; INTRAVENOUS
Status: DISCONTINUED | OUTPATIENT
Start: 2025-04-28 | End: 2025-04-28 | Stop reason: HOSPADM

## 2025-04-28 RX ORDER — HYDROMORPHONE HYDROCHLORIDE 1 MG/ML
0.5 INJECTION, SOLUTION INTRAMUSCULAR; INTRAVENOUS; SUBCUTANEOUS
Status: DISPENSED | OUTPATIENT
Start: 2025-04-28 | End: 2025-05-03

## 2025-04-28 RX ORDER — ACETAMINOPHEN 650 MG/1
650 SUPPOSITORY RECTAL EVERY 4 HOURS PRN
Status: DISCONTINUED | OUTPATIENT
Start: 2025-04-28 | End: 2025-05-03 | Stop reason: HOSPADM

## 2025-04-28 RX ORDER — ONDANSETRON 4 MG/1
4 TABLET, ORALLY DISINTEGRATING ORAL EVERY 6 HOURS PRN
Status: DISCONTINUED | OUTPATIENT
Start: 2025-04-28 | End: 2025-05-03 | Stop reason: HOSPADM

## 2025-04-28 RX ORDER — POTASSIUM CHLORIDE 20MEQ/15ML
40 LIQUID (ML) ORAL DAILY
Status: DISCONTINUED | OUTPATIENT
Start: 2025-04-28 | End: 2025-05-03 | Stop reason: HOSPADM

## 2025-04-28 RX ORDER — NALOXONE HCL 0.4 MG/ML
0.4 VIAL (ML) INJECTION
Status: DISCONTINUED | OUTPATIENT
Start: 2025-04-28 | End: 2025-05-03 | Stop reason: HOSPADM

## 2025-04-28 RX ORDER — HYDROMORPHONE HYDROCHLORIDE 1 MG/ML
0.5 INJECTION, SOLUTION INTRAMUSCULAR; INTRAVENOUS; SUBCUTANEOUS
Status: DISCONTINUED | OUTPATIENT
Start: 2025-04-28 | End: 2025-04-28 | Stop reason: HOSPADM

## 2025-04-28 RX ORDER — FENTANYL CITRATE 50 UG/ML
INJECTION, SOLUTION INTRAMUSCULAR; INTRAVENOUS AS NEEDED
Status: DISCONTINUED | OUTPATIENT
Start: 2025-04-28 | End: 2025-04-28 | Stop reason: SURG

## 2025-04-28 RX ORDER — ACETAMINOPHEN 325 MG/1
650 TABLET ORAL EVERY 4 HOURS PRN
Status: DISCONTINUED | OUTPATIENT
Start: 2025-04-28 | End: 2025-05-03 | Stop reason: HOSPADM

## 2025-04-28 RX ORDER — LIDOCAINE HYDROCHLORIDE 20 MG/ML
INJECTION, SOLUTION EPIDURAL; INFILTRATION; INTRACAUDAL; PERINEURAL AS NEEDED
Status: DISCONTINUED | OUTPATIENT
Start: 2025-04-28 | End: 2025-04-28 | Stop reason: SURG

## 2025-04-28 RX ORDER — BISACODYL 10 MG
10 SUPPOSITORY, RECTAL RECTAL DAILY PRN
Status: DISCONTINUED | OUTPATIENT
Start: 2025-04-28 | End: 2025-05-03 | Stop reason: HOSPADM

## 2025-04-28 RX ORDER — HYDROCHLOROTHIAZIDE 25 MG/1
50 TABLET ORAL DAILY
Status: DISCONTINUED | OUTPATIENT
Start: 2025-04-28 | End: 2025-05-03 | Stop reason: HOSPADM

## 2025-04-28 RX ORDER — SODIUM CHLORIDE 0.9 % (FLUSH) 0.9 %
3 SYRINGE (ML) INJECTION EVERY 12 HOURS SCHEDULED
Status: DISCONTINUED | OUTPATIENT
Start: 2025-04-28 | End: 2025-05-03 | Stop reason: HOSPADM

## 2025-04-28 RX ORDER — MAGNESIUM HYDROXIDE 1200 MG/15ML
LIQUID ORAL AS NEEDED
Status: DISCONTINUED | OUTPATIENT
Start: 2025-04-28 | End: 2025-04-28 | Stop reason: HOSPADM

## 2025-04-28 RX ORDER — ONDANSETRON 2 MG/ML
4 INJECTION INTRAMUSCULAR; INTRAVENOUS EVERY 6 HOURS PRN
Status: DISCONTINUED | OUTPATIENT
Start: 2025-04-28 | End: 2025-05-03 | Stop reason: HOSPADM

## 2025-04-28 RX ORDER — SODIUM CHLORIDE, SODIUM LACTATE, POTASSIUM CHLORIDE, CALCIUM CHLORIDE 600; 310; 30; 20 MG/100ML; MG/100ML; MG/100ML; MG/100ML
20 INJECTION, SOLUTION INTRAVENOUS CONTINUOUS
Status: DISCONTINUED | OUTPATIENT
Start: 2025-04-28 | End: 2025-04-28

## 2025-04-28 RX ORDER — SODIUM CHLORIDE 9 MG/ML
40 INJECTION, SOLUTION INTRAVENOUS AS NEEDED
Status: DISCONTINUED | OUTPATIENT
Start: 2025-04-28 | End: 2025-04-28 | Stop reason: HOSPADM

## 2025-04-28 RX ORDER — GLYCOPYRROLATE 0.2 MG/ML
INJECTION INTRAMUSCULAR; INTRAVENOUS AS NEEDED
Status: DISCONTINUED | OUTPATIENT
Start: 2025-04-28 | End: 2025-04-28 | Stop reason: SURG

## 2025-04-28 RX ORDER — PANTOPRAZOLE SODIUM 40 MG/1
40 TABLET, DELAYED RELEASE ORAL
Status: DISCONTINUED | OUTPATIENT
Start: 2025-04-29 | End: 2025-05-03 | Stop reason: HOSPADM

## 2025-04-28 RX ORDER — OXYCODONE AND ACETAMINOPHEN 10; 325 MG/1; MG/1
1 TABLET ORAL EVERY 8 HOURS PRN
COMMUNITY

## 2025-04-28 RX ORDER — SODIUM CHLORIDE, SODIUM LACTATE, POTASSIUM CHLORIDE, CALCIUM CHLORIDE 600; 310; 30; 20 MG/100ML; MG/100ML; MG/100ML; MG/100ML
100 INJECTION, SOLUTION INTRAVENOUS CONTINUOUS
Status: DISCONTINUED | OUTPATIENT
Start: 2025-04-28 | End: 2025-04-28

## 2025-04-28 RX ORDER — OXYCODONE AND ACETAMINOPHEN 7.5; 325 MG/1; MG/1
1 TABLET ORAL EVERY 4 HOURS PRN
Status: DISPENSED | OUTPATIENT
Start: 2025-04-28 | End: 2025-05-03

## 2025-04-28 RX ORDER — ONDANSETRON 2 MG/ML
4 INJECTION INTRAMUSCULAR; INTRAVENOUS ONCE AS NEEDED
Status: DISCONTINUED | OUTPATIENT
Start: 2025-04-28 | End: 2025-04-28 | Stop reason: HOSPADM

## 2025-04-28 RX ORDER — LABETALOL HYDROCHLORIDE 5 MG/ML
5 INJECTION, SOLUTION INTRAVENOUS
Status: DISCONTINUED | OUTPATIENT
Start: 2025-04-28 | End: 2025-04-28 | Stop reason: HOSPADM

## 2025-04-28 RX ORDER — OXYCODONE HCL 20 MG/1
20 TABLET, FILM COATED, EXTENDED RELEASE ORAL ONCE
Status: COMPLETED | OUTPATIENT
Start: 2025-04-28 | End: 2025-04-28

## 2025-04-28 RX ORDER — SODIUM CHLORIDE 0.9 % (FLUSH) 0.9 %
10 SYRINGE (ML) INJECTION AS NEEDED
Status: DISCONTINUED | OUTPATIENT
Start: 2025-04-28 | End: 2025-05-03 | Stop reason: HOSPADM

## 2025-04-28 RX ORDER — FLUTICASONE PROPIONATE 50 MCG
2 SPRAY, SUSPENSION (ML) NASAL DAILY
Status: DISCONTINUED | OUTPATIENT
Start: 2025-04-28 | End: 2025-05-03 | Stop reason: HOSPADM

## 2025-04-28 RX ORDER — POLYETHYLENE GLYCOL 3350 17 G/17G
17 POWDER, FOR SOLUTION ORAL DAILY PRN
Status: DISCONTINUED | OUTPATIENT
Start: 2025-04-28 | End: 2025-05-03 | Stop reason: HOSPADM

## 2025-04-28 RX ORDER — SODIUM CHLORIDE, SODIUM LACTATE, POTASSIUM CHLORIDE, CALCIUM CHLORIDE 600; 310; 30; 20 MG/100ML; MG/100ML; MG/100ML; MG/100ML
100 INJECTION, SOLUTION INTRAVENOUS CONTINUOUS PRN
Status: DISCONTINUED | OUTPATIENT
Start: 2025-04-28 | End: 2025-04-28 | Stop reason: HOSPADM

## 2025-04-28 RX ORDER — ROCURONIUM BROMIDE 10 MG/ML
INJECTION, SOLUTION INTRAVENOUS AS NEEDED
Status: DISCONTINUED | OUTPATIENT
Start: 2025-04-28 | End: 2025-04-28 | Stop reason: SURG

## 2025-04-28 RX ORDER — OXYCODONE AND ACETAMINOPHEN 10; 325 MG/1; MG/1
1 TABLET ORAL ONCE AS NEEDED
Status: COMPLETED | OUTPATIENT
Start: 2025-04-28 | End: 2025-04-28

## 2025-04-28 RX ORDER — BISACODYL 5 MG/1
5 TABLET, DELAYED RELEASE ORAL DAILY PRN
Status: DISCONTINUED | OUTPATIENT
Start: 2025-04-28 | End: 2025-05-03 | Stop reason: HOSPADM

## 2025-04-28 RX ORDER — ALBUTEROL SULFATE 90 UG/1
2 INHALANT RESPIRATORY (INHALATION) AS NEEDED
Status: DISCONTINUED | OUTPATIENT
Start: 2025-04-28 | End: 2025-05-03 | Stop reason: HOSPADM

## 2025-04-28 RX ORDER — CITALOPRAM HYDROBROMIDE 20 MG/1
40 TABLET ORAL DAILY
Status: DISCONTINUED | OUTPATIENT
Start: 2025-04-28 | End: 2025-05-03 | Stop reason: HOSPADM

## 2025-04-28 RX ORDER — PROPOFOL 10 MG/ML
VIAL (ML) INTRAVENOUS AS NEEDED
Status: DISCONTINUED | OUTPATIENT
Start: 2025-04-28 | End: 2025-04-28 | Stop reason: SURG

## 2025-04-28 RX ORDER — BUDESONIDE AND FORMOTEROL FUMARATE DIHYDRATE 160; 4.5 UG/1; UG/1
2 AEROSOL RESPIRATORY (INHALATION)
Status: DISCONTINUED | OUTPATIENT
Start: 2025-04-28 | End: 2025-05-03 | Stop reason: HOSPADM

## 2025-04-28 RX ORDER — TAMSULOSIN HYDROCHLORIDE 0.4 MG/1
0.4 CAPSULE ORAL 2 TIMES DAILY
Status: DISCONTINUED | OUTPATIENT
Start: 2025-04-28 | End: 2025-05-03 | Stop reason: HOSPADM

## 2025-04-28 RX ORDER — NALOXONE HCL 0.4 MG/ML
0.4 VIAL (ML) INJECTION AS NEEDED
Status: DISCONTINUED | OUTPATIENT
Start: 2025-04-28 | End: 2025-04-28 | Stop reason: HOSPADM

## 2025-04-28 RX ADMIN — GLYCOPYRROLATE 0.2 MG: 0.2 INJECTION INTRAMUSCULAR; INTRAVENOUS at 10:44

## 2025-04-28 RX ADMIN — FENTANYL CITRATE 100 MCG: 50 INJECTION, SOLUTION INTRAMUSCULAR; INTRAVENOUS at 12:12

## 2025-04-28 RX ADMIN — SODIUM CHLORIDE, POTASSIUM CHLORIDE, SODIUM LACTATE AND CALCIUM CHLORIDE 20 ML/HR: 600; 310; 30; 20 INJECTION, SOLUTION INTRAVENOUS at 08:52

## 2025-04-28 RX ADMIN — CITALOPRAM HYDROBROMIDE 40 MG: 20 TABLET ORAL at 15:48

## 2025-04-28 RX ADMIN — OXYCODONE AND ACETAMINOPHEN 1 TABLET: 325; 10 TABLET ORAL at 13:18

## 2025-04-28 RX ADMIN — MINOCYCLINE HYDROCHLORIDE 100 MG: 50 CAPSULE ORAL at 20:03

## 2025-04-28 RX ADMIN — SODIUM CHLORIDE 100 ML/HR: 9 INJECTION, SOLUTION INTRAVENOUS at 14:32

## 2025-04-28 RX ADMIN — ACETAMINOPHEN 1000 MG: 500 TABLET, FILM COATED ORAL at 09:29

## 2025-04-28 RX ADMIN — PROPOFOL 150 MG: 10 INJECTION, EMULSION INTRAVENOUS at 10:05

## 2025-04-28 RX ADMIN — OXYCODONE HYDROCHLORIDE 20 MG: 20 TABLET, FILM COATED, EXTENDED RELEASE ORAL at 08:22

## 2025-04-28 RX ADMIN — HYDROCHLOROTHIAZIDE 50 MG: 25 TABLET ORAL at 15:48

## 2025-04-28 RX ADMIN — OXYCODONE AND ACETAMINOPHEN 1 TABLET: 325; 10 TABLET ORAL at 19:12

## 2025-04-28 RX ADMIN — ROCURONIUM BROMIDE 45 MG: 10 INJECTION, SOLUTION INTRAVENOUS at 10:43

## 2025-04-28 RX ADMIN — SODIUM CHLORIDE 100 ML/HR: 9 INJECTION, SOLUTION INTRAVENOUS at 23:32

## 2025-04-28 RX ADMIN — CEFAZOLIN 2000 MG: 2 INJECTION, POWDER, FOR SOLUTION INTRAMUSCULAR; INTRAVENOUS at 19:08

## 2025-04-28 RX ADMIN — FENTANYL CITRATE 150 MCG: 50 INJECTION, SOLUTION INTRAMUSCULAR; INTRAVENOUS at 10:05

## 2025-04-28 RX ADMIN — Medication 400 MG: at 15:48

## 2025-04-28 RX ADMIN — LIDOCAINE HYDROCHLORIDE 100 MG: 20 INJECTION, SOLUTION EPIDURAL; INFILTRATION; INTRACAUDAL; PERINEURAL at 10:05

## 2025-04-28 RX ADMIN — HYDROMORPHONE HYDROCHLORIDE 1 MG: 1 INJECTION, SOLUTION INTRAMUSCULAR; INTRAVENOUS; SUBCUTANEOUS at 12:43

## 2025-04-28 RX ADMIN — SENNOSIDES, DOCUSATE SODIUM 2 TABLET: 50; 8.6 TABLET, FILM COATED ORAL at 20:03

## 2025-04-28 RX ADMIN — SUGAMMADEX 200 MG: 100 INJECTION, SOLUTION INTRAVENOUS at 12:43

## 2025-04-28 RX ADMIN — BUDESONIDE AND FORMOTEROL FUMARATE DIHYDRATE 2 PUFF: 160; 4.5 AEROSOL RESPIRATORY (INHALATION) at 14:32

## 2025-04-28 RX ADMIN — Medication 3 ML: at 20:04

## 2025-04-28 RX ADMIN — CEFAZOLIN 2000 MG: 2 INJECTION, POWDER, FOR SOLUTION INTRAMUSCULAR; INTRAVENOUS at 10:37

## 2025-04-28 RX ADMIN — MINOXIDIL 5 MG: 2.5 TABLET ORAL at 15:48

## 2025-04-28 RX ADMIN — ROCURONIUM BROMIDE 5 MG: 10 INJECTION, SOLUTION INTRAVENOUS at 10:05

## 2025-04-28 RX ADMIN — SODIUM CHLORIDE, POTASSIUM CHLORIDE, SODIUM LACTATE AND CALCIUM CHLORIDE 100 ML/HR: 600; 310; 30; 20 INJECTION, SOLUTION INTRAVENOUS at 08:52

## 2025-04-28 RX ADMIN — FLUTICASONE PROPIONATE 2 SPRAY: 50 SPRAY, METERED NASAL at 15:49

## 2025-04-28 RX ADMIN — Medication 3 ML: at 14:32

## 2025-04-28 RX ADMIN — TAMSULOSIN HYDROCHLORIDE 0.4 MG: 0.4 CAPSULE ORAL at 20:03

## 2025-04-28 RX ADMIN — ROCURONIUM BROMIDE 30 MG: 10 INJECTION, SOLUTION INTRAVENOUS at 11:41

## 2025-04-28 RX ADMIN — POTASSIUM CHLORIDE 40 MEQ: 20 SOLUTION ORAL at 15:48

## 2025-04-28 RX ADMIN — BUDESONIDE AND FORMOTEROL FUMARATE DIHYDRATE 2 PUFF: 160; 4.5 AEROSOL RESPIRATORY (INHALATION) at 18:37

## 2025-04-28 NOTE — PLAN OF CARE
Goal Outcome Evaluation:  Plan of Care Reviewed With: patient        Progress: no change  Outcome Evaluation: The patient presents alert and oriented x4 sitting EOB with LSO in place. He was educated on the proper use of the LSO and his spinal restrictions. He demonstrates no focal neurological deficits in strength, sensation, or coordination. He was able to ambulate down the entire length of the hallway safely  through he did list side to side at times. PT will check on him tomorrow to make sure he has no further questions or concerns between surgeries.

## 2025-04-28 NOTE — ANESTHESIA PREPROCEDURE EVALUATION
Anesthesia Evaluation     no history of anesthetic complications:   NPO Solid Status: > 8 hours  NPO Liquid Status: > 8 hours           Airway   Mallampati: I  TM distance: >3 FB  No difficulty expected  Dental      Pulmonary    (+) asthma (as child),sleep apnea  Cardiovascular   Exercise tolerance: good (4-7 METS)    (+) hypertension  (-) CAD      Neuro/Psych  (-) seizures, TIA, CVA  GI/Hepatic/Renal/Endo    (+) obesity, GERD  (-) liver disease, no renal disease, diabetes    Musculoskeletal     Abdominal    Substance History      OB/GYN          Other   arthritis,                 Anesthesia Plan    ASA 2     general     intravenous induction     Anesthetic plan, risks, benefits, and alternatives have been provided, discussed and informed consent has been obtained with: patient.    CODE STATUS:

## 2025-04-28 NOTE — OP NOTE
Kurtis Valladares  4/28/2025     PREOPERATIVE DIAGNOSIS:   1. Status post hemilaminectomy, decompression, partial facetectomy decompression, bilateral L3-4, left L5-S1, 11/13/2023    2. Status post irrigation and debridement posterior lumbar wound, 12/13/2023    3. Status post revision irrigation and debridement posterior lumbar wound, 02/16/2024   4.  Residual chronic low back pain, left worse than right  5.  Residual left buttock, thigh, and leg radiculopathy  6.  Residual right lateral and anterior thigh radiculopathy  7.  Residual neurogenic claudication  8.  Degenerative disc disease L3-S1  9.  Facet arthropathy L2-S1  10.  Degenerative scoliosis, concave right T12-L4, concave left L5-S1  11.  Congenital short pedicle syndrome  12.  Probable autofusion L4-5  13.  Severe foraminal stenosis right L3-4, left L5-S1     POSTOPERATIVE DIAGNOSIS: same     PROCEDURE PERFORMED:   1.  Anterior lumbar interbody fusion of L5-S1 with instrumentation  2.  Placement of a 13 cm Prevena wound VAC     SURGEON: Montana Rangel DO   COSURGEON: Segun Iyer MD     ANESTHESIA: General.    PREPARATION: Routine.    STAFF: Circulator: Kishore Cruz RN; Kandis Zendejas RN  Scrub Person: Etelvina Glover; Nicole Valenzuela CST; Kiera Delaney  Vendor Representative: Iris Garcia Matt  Assistant: Primo Avila PA-C    ESTIMATED BLOOD LOSS: 50 mL    SPECIMENS: None    COMPLICATIONS: None    INDICATIONS: Kurtis Valladares is a 59 y.o. male who you are currently following for chronic back pain.  Kurtis Valladares is scheduled for anterior lumbar interbody fusion of L5-S1 with Dr. Iyer on 4/28/25.  The patient denies any history of DVT.  His dad did die from AAA. The indications, risks, and possible complications of the procedure were explained to the patient, who voiced understanding and wished to proceed with surgery.     PROCEDURE IN DETAIL:   The patient was taken to the operating room and placed on  the operating table in a supine position. After general anesthesia was obtained, the abdomen was prepped and draped in a sterile manner.  A transverse incision was then made in the left lower quadrant.  Careful dissection was made down through the subcutaneous tissues using the Bovie cautery to ensure hemostasis.  Any crossing veins were ligated with 3-0 silk suture and hemoclips.  The rectus fascia was identified.  It was incised with the Bovie cautery.  Kocher clamps are placed on each side of the rectus fascia and subfascial planes were established in a cephalad and caudad direction.  Once the subfascial planes were established the attention was then turned to the left rectus muscle.  Blunt mobilization was made of the left rectus muscle including its blood supply medially and to the right.  Once it was fully mobilized the attention was then turned laterally to the peritoneal reflection.  Entrance into the retroperitoneal space was established with the use of a sponge stick and the Bovie cautery.  Continued blunt mobilization was made with my hand using a finger sweeping motion moving the peritoneal contents and sacral fat pad medially and to the right.  Once it was fully mobilized the Brau-Pastrana retractor system was set up.  The retractor blades were set in place.  The left iliac vein was then carefully dissected free and placed safely behind the retractor blade.  The sacral vessels were carefully taken down with hemoclips.  At this point full exposure was established of the L5-S1 disc space.  The next part of the case will be dictated by Dr. Iyer.  Upon completion of Dr. Iyer's part of the case the wound bed was irrigated with antibiotic saline and hemostasis was observed. VersaWrap was carefully placed over the L5-S1 disc space repair.  The retractor blades were carefully taken out one at a time.  The structures were then placed back in their normal anatomic positions.  The rectus fascia was then  closed with a #1 PDS in a running fashion to meet in the midline.  The deep layers were closed with a 2-0 Vicryl in a running fashion.  The subcutaneous layers were closed with a 3-0 Vicryl in a running fashion.  The skin was then reapproximated using a 4-0 Monocryl in a subcuticular fashion.  The wound was then cleaned.  A 13 cm Prevena wound VAC was applied. The patient tolerated the procedure well. Sponge and needle counts were correct. The patient was then awakened and extubated in the operating room and taken to the recovery room in good condition.    Montana Rangel, DO    Date: 4/28/2025 Time: 12:50 CDT

## 2025-04-28 NOTE — THERAPY EVALUATION
Patient Name: Kurtis Valladares  : 1965    MRN: [id number]                              Today's Date: 2025       Admit Date: 2025    Visit Dx:     ICD-10-CM ICD-9-CM   1. Impaired mobility [Z74.09]  Z74.09 799.89     Patient Active Problem List   Diagnosis    Right knee pain    Primary osteoarthritis of right knee    Effusion of right knee    Pain of right heel    Postcalcaneal bursitis of right foot    Pre-op evaluation    Abnormal nuclear stress test    Preop cardiovascular exam    Lumbar radiculopathy    Hypokalemia    Foraminal stenosis of lumbar region, severe    Bradycardia    Lumbosacral disc disease    Lumbar spondylosis    Lumbosacral radiculopathy    BPH with obstruction/lower urinary tract symptoms    MARTI (generalized anxiety disorder)    Drug-induced constipation     Past Medical History:   Diagnosis Date    Acute bronchitis     not since     Arthritis     Asthma     Dyspnea     Enlarged prostate     GERD (gastroesophageal reflux disease)     Hypertension     Pain in lower limb     Sleep apnea     history of, pt states he thinks it has resolved    Sprain of ankle     Wound dehiscence, surgical 2024    lumbar wound     Past Surgical History:   Procedure Laterality Date    CARDIAC CATHETERIZATION N/A 2020    Procedure: Left Heart Cath/PCI if indicated;  Surgeon: Hawa Dominguez MD;  Location:  [facility];  Service: Cardiology    GASTRIC SLEEVE LAPAROSCOPIC      INCISION AND DRAINAGE POSTERIOR SPINE N/A 2023    Procedure: IRRIGATION AND DEBRIDEMENT POSTERIOR LUMBAR WOUND;  Surgeon: PAWEL Iyer MD;  Location:  [facility] OR;  Service: Orthopedic Spine;  Laterality: N/A;    INCISION AND DRAINAGE POSTERIOR SPINE N/A 2024    Procedure: IRRIGATION AND DEBRIDEMENT LUMBAR WOUND DEHISCENCE;  Surgeon: PAWEL Iyer MD;  Location:  [facility] OR;  Service: Orthopedic Spine;  Laterality: N/A;    LUMBAR LAMINECTOMY WITH FUSION N/A 2023     Procedure: HEMILAMINECTOMY, DECOMPRESSION, PARTIAL FACETECTOMY, FORAMINOTOMY BILATERAL L3-4, LEFT L5-S1;  Surgeon: PAWEL Iyer MD;  Location: [facility];  Service: Orthopedic Spine;  Laterality: N/A;      General Information       Row Name 04/28/25 [facility]          Physical Therapy Time and Intention    Document Type evaluation  Pt s/p anterior decompression, ALIF L5-S1, back pain, L buttock, thigh, leg radiculpathy, R lateral and anterior thigh pain  -MS     Mode of Treatment physical therapy;co-treatment  -MS       Row Name 04/28/25 1431          General Information    Patient Profile Reviewed yes  -MS     Prior Level of Function independent:;all household mobility;ADL's  -MS     Existing Precautions/Restrictions fall;spinal;LSO;brace worn when out of bed  -MS     Barriers to Rehab none identified  -MS       Row Name 04/28/25 1431          Living Environment    Current Living Arrangements home  -MS     People in Home spouse  -MS       Row Name 04/28/25 1431          Home Main Entrance    Number of Stairs, Main Entrance two  -MS     Stair Railings, Main Entrance none  -MS       Row Name 04/28/25 1431          Stairs Within Home, Primary    Number of Stairs, Within Home, Primary none  -MS       Row Name 04/28/25 1431          Cognition    Orientation Status (Cognition) oriented x 4  -MS       Row Name 04/28/25 1431          Safety Issues/Impairments Affecting Functional Mobility    Impairments Affecting Function (Mobility) pain  -MS               User Key  (r) = Recorded By, (t) = Taken By, (c) = Cosigned By      Initials Name Provider Type    MS Ramona Ross R, PT, DPT, NCS Physical Therapist                   Mobility       Row Name 04/28/25 1431          Bed Mobility    Comment, (Bed Mobility) pt sitting EOB  -MS       Row Name 04/28/25 1431          Sit-Stand Transfer    Sit-Stand [place] (Transfers) supervision  -MS       Row Name 04/28/25 1431          Gait/Stairs (Locomotion)    [place]  Level (Gait) contact guard;verbal cues;nonverbal cues (demo/gesture)  -MS     Patient was able to Ambulate yes  -MS     Distance in Feet (Gait) 500  -MS               User Key  (r) = Recorded By, (t) = Taken By, (c) = Cosigned By      Initials Name Provider Type    Ramona Leyva KATE, PT, DPT, NCS Physical Therapist                   Obj/Interventions       Row Name 04/28/25 1431          Range of Motion Comprehensive    General Range of Motion bilateral upper extremity ROM WFL;bilateral lower extremity ROM WFL  -MS       Row Name 04/28/25 1431          Strength Comprehensive (MMT)    General Manual Muscle Testing (MMT) Assessment no strength deficits identified  -MS       Row Name 04/28/25 1431          Balance    Balance Assessment sitting static balance;sitting dynamic balance;standing static balance;standing dynamic balance  -MS     Static Sitting Balance independent  -MS     Dynamic Sitting Balance independent  -MS     Position, Sitting Balance unsupported;sitting edge of bed  -MS     Static Standing Balance contact guard  -MS     Dynamic Standing Balance contact guard  -MS     Position/Device Used, Standing Balance unsupported  -MS       Row Name 04/28/25 1431          Sensory Assessment (Somatosensory)    Sensory Assessment (Somatosensory) sensation intact  -MS               User Key  (r) = Recorded By, (t) = Taken By, (c) = Cosigned By      Initials Name Provider Type    MS Ross Ramona LOVE, PT, DPT, NCS Physical Therapist                   Goals/Plan       Row Name 04/28/25 1431          Bed Mobility Goal 1 (PT)    Activity/Assistive Device (Bed Mobility Goal 1, PT) bed mobility activities, all  -MS     [place] Level/Cues Needed (Bed Mobility Goal 1, PT) independent  -MS     Time Frame (Bed Mobility Goal 1, PT) long term goal (LTG);by discharge  -MS     Progress/Outcomes (Bed Mobility Goal 1, PT) new goal  -MS       Row Name 04/28/25 1431          Transfer Goal 1 (PT)    Activity/Assistive Device  (Transfer Goal 1, PT) sit-to-stand/stand-to-sit;bed-to-chair/chair-to-bed  -MS     [place] Level/Cues Needed (Transfer Goal 1, PT) independent  -MS     Time Frame (Transfer Goal 1, PT) long term goal (LTG);by discharge  -MS     Progress/Outcome (Transfer Goal 1, PT) new goal  -MS       Row Name 04/28/25 1431          Gait Training Goal 1 (PT)    Activity/Assistive Device (Gait Training Goal 1, PT) gait (walking locomotion)  -MS     [place] Level (Gait Training Goal 1, PT) independent  -MS     Distance (Gait Training Goal 1, PT) 1000ft  -MS     Time Frame (Gait Training Goal 1, PT) long term goal (LTG);by discharge  -MS     Progress/Outcome (Gait Training Goal 1, PT) new goal  -MS       Row Name 04/28/25 1431          Problem Specific Goal 1 (PT)    Problem Specific Goal 1 (PT) The patient will independently implement 1 pain management technique to decrease pain in order to improve functional mobility.  -MS     Time Frame (Problem Specific Goal 1, PT) long-term goal (LTG);by discharge  -MS     Progress/Outcome (Problem Specific Goal 1, PT) new goal  -MS       Row Name 04/28/25 1431          Therapy Assessment/Plan (PT)    Planned Therapy Interventions (PT) balance training;bed mobility training;gait training;patient/family education;orthotic fitting/training;transfer training;strengthening  -MS               User Key  (r) = Recorded By, (t) = Taken By, (c) = Cosigned By      Initials Name Provider Type    Ramona Leyva, PT, DPT, NCS Physical Therapist                   Clinical Impression       Row Name 04/28/25 1431          Pain    Pretreatment Pain Rating 6/10  -MS     Posttreatment Pain Rating 6/10  -MS     Pain Location abdomen  -MS     Pain Side/Orientation other (see comments)  incisional  -MS     Pain Management Interventions activity modification encouraged;exercise or physical activity utilized  -MS     Response to Pain Interventions no change per patient report  -MS       Row Name 04/28/25  1431          Plan of Care Review    Plan of Care Reviewed With patient  -MS     Progress no change  -MS     Outcome Evaluation The patient presents alert and oriented x4 sitting EOB with LSO in place. He was educated on the proper use of the LSO and his spinal restrictions. He demonstrates no focal neurological deficits in strength, sensation, or coordination. He was able to ambulate down the entire length of the hallway safely  through he did list side to side at times. PT will check on him tomorrow to make sure he has no further questions or concerns between surgeries.  -MS       Row Name 04/28/25 1431          Therapy Assessment/Plan (PT)    Patient/Family Therapy Goals Statement (PT) go home  -MS     Rehab Potential (PT) good  -MS     Criteria for Skilled Interventions Met (PT) yes;meets criteria;skilled treatment is necessary  -MS     Therapy Frequency (PT) 2 times/day  -MS     Predicted Duration of Therapy Intervention (PT) until discharge  -MS       Row Name 04/28/25 1431          Positioning and Restraints    Post Treatment Position chair  -MS     In Chair reclined;call light within reach;encouraged to call for assist;with family/caregiver;with brace  -MS               User Key  (r) = Recorded By, (t) = Taken By, (c) = Cosigned By      Initials Name Provider Type    MS Cody Ramona R, PT, DPT, NCS Physical Therapist                   Outcome Measures       Row Name 04/28/25 1437 04/28/25 1431       How much help from another person do you currently need...    Turning from your back to your side while in flat bed without using bedrails? 3  -DS 3  -MS    Moving from lying on back to sitting on the side of a flat bed without bedrails? 3  -DS 3  -MS    Moving to and from a bed to a chair (including a wheelchair)? 3  -DS 3  -MS    Standing up from a chair using your arms (e.g., wheelchair, bedside chair)? 3  -DS 3  -MS    Climbing 3-5 steps with a railing? 3  -DS 3  -MS    To walk in hospital room? 3  -DS 3   -MS    AM-PAC 6 Clicks Score (PT) 18  -DS 18  -MS    Highest Level of Mobility Goal 6 --> Walk 10 steps or more  -DS 6 --> Walk 10 steps or more  -MS      Row Name 04/28/25 1335          How much help from another person do you currently need...    Turning from your back to your side while in flat bed without using bedrails? 3  -TM     Moving from lying on back to sitting on the side of a flat bed without bedrails? 3  -TM     Moving to and from a bed to a chair (including a wheelchair)? 3  -TM     Standing up from a chair using your arms (e.g., wheelchair, bedside chair)? 3  -TM     Climbing 3-5 steps with a railing? 3  -TM     To walk in hospital room? 3  -TM     AM-PAC 6 Clicks Score (PT) 18  -TM     Highest Level of Mobility Goal 6 --> Walk 10 steps or more  -TM       Row Name 04/28/25 1431 04/28/25 1430       Functional Assessment    Outcome Measure Options AM-PAC 6 Clicks Basic Mobility (PT)  -MS AM-PAC 6 Clicks Daily Activity (OT)  -              User Key  (r) = Recorded By, (t) = Taken By, (c) = Cosigned By      Initials Name Provider Type    Ramona Leyva, PT, DPT, NCS Physical Therapist    Antonieta Richards, RN Registered Nurse    Etelvina Smith, OTR/L, CSRS Occupational Therapist    Maxine Yuen, RN Registered Nurse                                   PT Recommendation and Plan  Planned Therapy Interventions (PT): balance training, bed mobility training, gait training, patient/family education, orthotic fitting/training, transfer training, strengthening  Progress: no change  Outcome Evaluation: The patient presents alert and oriented x4 sitting EOB with LSO in place. He was educated on the proper use of the LSO and his spinal restrictions. He demonstrates no focal neurological deficits in strength, sensation, or coordination. He was able to ambulate down the entire length of the hallway safely  through he did list side to side at times. PT will check on him tomorrow to make sure he has no  further questions or concerns between surgeries.     Time Calculation:         PT Charges       Row Name 04/28/25 1431             Time Calculation    Start Time 1431  -MS      Stop Time 1459  -MS      Time Calculation (min) 28 min  -MS      PT Received On 04/28/25  -MS      PT Goal Re-Cert Due Date 05/08/25  -MS         Untimed Charges    PT Eval/Re-eval Minutes 28  -MS         Total Minutes    Untimed Charges Total Minutes 28  -MS       Total Minutes 28  -MS                User Key  (r) = Recorded By, (t) = Taken By, (c) = Cosigned By      Initials Name Provider Type    Ramona Leyva, PT, DPT, NCS Physical Therapist                      PT G-Codes  Outcome Measure Options: AM-PAC 6 Clicks Basic Mobility (PT)  AM-PAC 6 Clicks Score (PT): 18  AM-PAC 6 Clicks Score (OT): 22       Ramona Ross, PT, DPT, NCS  4/28/2025

## 2025-04-28 NOTE — ANESTHESIA POSTPROCEDURE EVALUATION
"Patient: Kurtis Valladares    Procedure Summary       Date: 04/28/25 Room / Location: [facility] OR  /  [facility] OR    Anesthesia Start: 1004 Anesthesia Stop: 1300    Procedures:       AMTERIOR DECOMPRESSION, ANTERIOR LUMBAR INTERBODY FUSION WITH INSTRUMENTATION L5-S1 (Spine Lumbar)      ANTERIOR LUMBAR EXPOSURE (Abdomen) Diagnosis: (m54.16)    Surgeons: PAWEL Iyer MD; Montana Rangel DO Provider: Umang Cohen CRNA    Anesthesia Type: general ASA Status: 2            Anesthesia Type: general    Vitals  Vitals Value Taken Time   /73 04/28/25 13:24   Temp 97 °F (36.1 °C) 04/28/25 12:57   Pulse 75 04/28/25 13:23   Resp 18 04/28/25 13:10   SpO2 94 % 04/28/25 13:23   Vitals shown include unfiled device data.        Post Anesthesia Care and Evaluation    Patient location during evaluation: PACU  Patient participation: complete - patient participated  Level of consciousness: awake and awake and alert  Pain score: 0  Pain management: adequate    Airway patency: patent  Anesthetic complications: No anesthetic complications  PONV Status: none  Cardiovascular status: acceptable  Respiratory status: acceptable  Hydration status: acceptable    Comments: Patient discharged according to acceptable Johnathon score per RN assessment. See nursing records for further information.     Blood pressure 118/75, pulse 76, temperature 97.4 °F (36.3 °C), temperature source Oral, resp. rate 18, height 185 cm (72.84\"), weight 110 kg (242 lb 4.6 oz), SpO2 98%.      "

## 2025-04-28 NOTE — ANESTHESIA PROCEDURE NOTES
Airway  Reason: elective    Date/Time: 4/28/2025 10:06 AM  Airway not difficult    General Information and Staff    Patient location during procedure: OR  CRNA/CAA: Umang Cohen CRNA    Indications and Patient Condition  Indications for airway management: airway protection    Preoxygenated: yes  MILS maintained throughout    Mask difficulty assessment: 1 - vent by mask    Final Airway Details    Final airway type: endotracheal airway      Successful airway: ETT  Cuffed: yes   Successful intubation technique: direct laryngoscopy and video laryngoscopy  Endotracheal tube insertion site: oral  Blade: Keys  Blade size: 4  ETT size (mm): 7.5  Cormack-Lehane Classification: grade I - full view of glottis  Placement verified by: chest auscultation and capnometry   Cuff volume (mL): 5  Measured from: lips  ETT/EBT  to lips (cm): 22  Number of attempts at approach: 1  Assessment: lips, teeth, and gum same as pre-op and atraumatic intubation

## 2025-04-28 NOTE — OP NOTE
Anterior decompression, ALIF with instrumentation procedure Note    Kurtis Valladares  4/28/2025    Pre-op Diagnosis:    1. Status post hemilaminectomy, decompression, partial facetectomy decompression, bilateral L3-4, left L5-S1, 11/13/2023    2. Status post irrigation and debridement posterior lumbar wound, 12/13/2023    3. Status post revision irrigation and debridement posterior lumbar wound, 02/16/2024   4.  Residual chronic low back pain, left worse than right  5.  Residual left buttock, thigh, and leg radiculopathy  6.  Residual right lateral and anterior thigh radiculopathy  7.  Residual neurogenic claudication  8.  Degenerative disc disease L3-S1  9.  Facet arthropathy L2-S1  10.  Degenerative scoliosis, concave right T12-L4, concave left L5-S1  11.  Congenital short pedicle syndrome  12.  Probable autofusion L4-5  13.  Severe foraminal stenosis right L3-4, left L5-S1    Post-op Diagnosis:    same    Procedure/CPT® Codes:     1. Anterior discectomy decompression with bilateral neural foraminotomy L5-S1  2. Anterior lumbar interbody fusion L5-S1  3. Anterior spinal instrumentation L5-S1 (ATEC anterior plate and screws)  4. Use of titanium interbody biomechanical device for fusion L5-S1 ([employer]EC titanium spacer and screws)  5. Use of allograft bone matrix for fusion L5-S1 (Induce NMP Fibers)  6. Use of fluoroscopy for confirmation of surgical level, placement of interbody spacer and instrumentation  7. Intraoperative neural monitoring    Spinal Surgery Levels Completed:1 Level      Anesthesia: General     Surgeon: FILIBERTO Iyer MD     Co Surgeon: Dr. Montana Rangel D.O.     Assistant: Huang Avila PA-C     Estimated Blood Loss: 50 mL     Complications: None     Condition: Stable to PACU.     Indications:     The patient is a 59-year-old who sees Shira Worthington NP for medical issues.  He originally presented to the office with chronic low back pain along with mainly left leg radiculopathy and symptoms  consistent with neurogenic claudication.  While we briefly discussed a decompression and fusion procedure, we elected to proceed with a smaller decompression procedure alone at L3-4 and L5-S1.  This procedure was performed on 11/13/2023.  Unfortunately, the surgery was complicated by a draining infected wound that required two irrigation and debridement procedures.  He presented back to the office with residual chronic low back pain as well as residual leg radiculopathy and symptoms consistent with neurogenic claudication.  Repeat imaging studies revealed degenerative disc disease from L3-S1 with facet arthropathy from L2-S1 and congenital short pedicles syndrome.  He was also noted to have a degenerative scoliosis concave to the right from T12-L4 and concave to the left at L5-S1.  He was likely to have a autofusion at L4-5.  These findings created severe foraminal stenosis on the right side of L3-4 on the left side of L5-S1.    After failing all conservative measures, it was mutually decided that surgery would be the best option.  Risks, benefits, and complications of surgery were discussed in detail. The patient appeared well informed and wished to proceed. We specifically discussed the risk of infection, blood loss, nerve root injury, CSF leak, and the possibility of incomplete resolution of symptoms. We also discussed the possible risk of a nonunion and the potential need for additional surgery in the event of a pseudoarthrosis or hardware failure.    We elected to proceed with a staged operation.  Today we are performing an anterior decompression and fusion of L5-S1, it is planned that we will be returning to surgery for a second lateral procedure in a few days to address the L3-4 level.  The patient will also require a final posterior procedure involving a posterior spinal fusion with instrumentation.     Operative Procedure:     After obtaining informed consent and verifying the correct operative level, the  patient was brought to the operating room and placed supine on an operating table. A general anesthetic was provided by the anesthesia service with the assistance of an endotracheal tube. Once this was appropriately positioned and secured, the anterior abdominal region was prepped and draped in usual sterile fashion. A surgical timeout was taken to confirm this was the correct patient, we were working at the correct level, and that preoperative antibiotics were given in a timely fashion.     At this point, Dr. Montana Rangel D.O. provided vascular access to the L5-S1 level. He performed a left sided anterior retroperitoneal approach to the L5-S1 segment. Please see his separate dictated operative report regarding the details on the approach itself.  When I entered the procedure, self retaining retractors were already in position with excellent exposure of the L5-S1 disc space.     After confirming we were at the correct level using fluoroscopy, I used a long handle 10 blade scalpel to cut into the L5-S1 disc space. A Law elevator was used to remove disc material off of the endplates. Disc material was retrieved using pituitaries and Kerrisons. A disc space distractor was then placed into the L5-S1 disc space and I used curettes to remove posterior disc material. Kerrisons were used to remove posterior osteophytes across the endplates of L5 and S1. There was high-grade stenosis centrally but also foraminally, especially on the left. I then performed a bilateral neural foraminotomy with Kerrisons and curettes. The decompression was much more involved than what is usually required for an anterior lumbar interbody fusion by itself and required significantly more time to perform.  This was due to the severe disc space collapse and high-grade foraminal stenosis bilaterally, but especially on the left.     After the decompression was completed bilaterally and centrally, I used a series of endplate scrapers to prepare the  endplates for interbody fusion. Trial spacers were then malleted into position and it was felt that a large 18 mm titanium spacer with 20 degrees lordosis from the ATEC instrumentation set would be the best fit to restore disc height also restoring foraminal height and providing some indirect decompression. The disc space was then thoroughly irrigated with saline solution.  Gelfoam powder with thrombin was used to control epidural bleeding.     A large 18 mm titanium spacer from the ATEC instrumentation set was then packed as tightly as possible with an allograft bone matrix called Induce NMP Fibers. This spacer was then malleted into the L5-S1 disc space under fluoroscopic guidance. It was placed as an interbody biomechanical device to assist with fusion.  I then placed an 8.5 mm x 30 mm graft bolt through the spacer into L5 and a 5.0 mm x 30 mm screw through the spacer into S1.  The screws were placed to help maintain position of the spacer as well as to assist with the fusion process.     A 4-hole anterior plate from the ATEC instrumentation set was then chosen. The 4 holes were drilled and four 6.0 mm x 30 mm screws were used to fix the plate across the L5-S1 segment augmenting the fusion.      We then inspected the entire operative field for any signs of bleeding.  Bleeding was once again controlled using thrombin with Gelfoam powder and bipolar cautery.  Once we ensured that adequate hemostasis was accomplished, final fluoroscopy imaging was taken to confirm adequate position of our implants. There was excellent restoration of disc height and the plate and screw construct appeared to be adequately positioned across L5-S1.    I then placed a VersaWrap sheet over the anterior instrumentation to hopefully decrease the risk of postoperative adhesions and scarring.     Please see Dr. Montana Rangel's separate dictated operative report regarding the closure of the wound. Once this was accomplished, the patient was  extubated and sent to the recovery room in good stable condition. We estimated blood loss to be approximately 50 mL and the patient remained hemodynamically stable during the procedure.     Intraoperative neuro monitoring was ordered and carried out throughout the procedure to add an increased level of safety for the patient.  The interpreting physician was available by means of real-time continuous, bidirectional, remote audio and visual communication as needed throughout the entire procedure.  Modalities used during the procedure included SSEP, EEG, MEP, EMG, and TOF.  There were no neuro monitoring signal changes during the procedure.    Dr. Montana Rangel D.O. provided vascular access to the L5-S1 level and acted as a co-surgeon in this fashion.  Huang Avila PA-C provided critical assistance during the decompression at L5-S1 as well as during the placement of the titanium spacer, bone graft and instrumentation to obtain a fusion across L5-S1.    FILIBERTO Ieyr MD    Date: 4/28/2025  Time: 15:26 CDT

## 2025-04-28 NOTE — THERAPY DISCHARGE NOTE
Acute Care - Occupational Therapy Discharge  [facility]    Patient Name: Kurtis Valladares  : 1965    MRN: [id number]                              Today's Date: 2025       Admit Date: 2025    Visit Dx: No diagnosis found.  Patient Active Problem List   Diagnosis    Right knee pain    Primary osteoarthritis of right knee    Effusion of right knee    Pain of right heel    Postcalcaneal bursitis of right foot    Pre-op evaluation    Abnormal nuclear stress test    Preop cardiovascular exam    Lumbar radiculopathy    Hypokalemia    Foraminal stenosis of lumbar region, severe    Bradycardia    Lumbosacral disc disease    Lumbar spondylosis    Lumbosacral radiculopathy    BPH with obstruction/lower urinary tract symptoms    MARTI (generalized anxiety disorder)    Drug-induced constipation     Past Medical History:   Diagnosis Date    Acute bronchitis     not since     Arthritis     Asthma     Dyspnea     Enlarged prostate     GERD (gastroesophageal reflux disease)     Hypertension     Pain in lower limb     Sleep apnea     history of, pt states he thinks it has resolved    Sprain of ankle     Wound dehiscence, surgical 2024    lumbar wound     Past Surgical History:   Procedure Laterality Date    CARDIAC CATHETERIZATION N/A 2020    Procedure: Left Heart Cath/PCI if indicated;  Surgeon: Hawa Dominguez MD;  Location:  [facility];  Service: Cardiology    GASTRIC SLEEVE LAPAROSCOPIC      INCISION AND DRAINAGE POSTERIOR SPINE N/A 2023    Procedure: IRRIGATION AND DEBRIDEMENT POSTERIOR LUMBAR WOUND;  Surgeon: PAWEL Iyer MD;  Location: [facility] OR;  Service: Orthopedic Spine;  Laterality: N/A;    INCISION AND DRAINAGE POSTERIOR SPINE N/A 2024    Procedure: IRRIGATION AND DEBRIDEMENT LUMBAR WOUND DEHISCENCE;  Surgeon: PAWEL Iyer MD;  Location:  [facility] OR;  Service: Orthopedic Spine;  Laterality: N/A;    LUMBAR LAMINECTOMY WITH FUSION N/A  11/13/2023    Procedure: HEMILAMINECTOMY, DECOMPRESSION, PARTIAL FACETECTOMY, FORAMINOTOMY BILATERAL L3-4, LEFT L5-S1;  Surgeon: PAWEL Iyer MD;  Location: [facility];  Service: Orthopedic Spine;  Laterality: N/A;      General Information       [facility] Name 04/28/25 1430          OT Time and Intention    Document Type evaluation  Pt s/p anterior decompression, ALIF L5-S1.  -     Mode of Treatment occupational therapy  -     Patient Effort good  -       Row Name 04/28/25 1430          General Information    Patient Profile Reviewed yes  -     Prior Level of Function independent:;all household mobility;transfer;ADL's;bed mobility  -     Existing Precautions/Restrictions fall;spinal;LSO;brace worn when out of bed  -       Row Name 04/28/25 1430          Living Environment    Current Living Arrangements home  -     People in Home spouse  -[facility] Name 04/28/25 1430          Home Main Entrance    Number of Stairs, Main Entrance two  -     Stair Railings, Main Entrance none  -       Row Name 04/28/25 1430          Stairs Within Home, Primary    Number of Stairs, Within Home, Primary none  -[facility] Name 04/28/25 1430          Cognition    Orientation Status (Cognition) oriented x 4  -       Row Name 04/28/25 1430          Safety Issues/Impairments Affecting Functional Mobility    Impairments Affecting Function (Mobility) balance;pain  -               User Key  (r) = Recorded By, (t) = Taken By, (c) = Cosigned By      Initials Name Provider Type     Etelvina Nj, OTR/L, CSRS Occupational Therapist                   Mobility/ADL's       Row Name 04/28/25 1430          Bed Mobility    Comment, (Bed Mobility) sitting up EOB upon therapist entry to room  -[facility] Name 04/28/25 1430          Transfers    Transfers sit-stand transfer;stand-sit transfer  -       Row Name 04/28/25 1430          Sit-Stand Transfer    Sit-Stand [place] (Transfers) supervision  -[facility]  Name 04/28/25 1430          Stand-Sit Transfer    Stand-Sit [place] (Transfers) supervision  -       Row Name 04/28/25 1430          Functional Mobility    Functional Mobility- Ind. Level contact guard assist  -     Functional Mobility- Comment in hallway  -       Row Name 04/28/25 1430          Activities of Daily Living    BADL Assessment/Intervention lower body dressing  -       Row Name 04/28/25 1430          Lower Body Dressing Assessment/Training    [place] Level (Lower Body Dressing) moderate assist (50% patient effort)  -     Position (Lower Body Dressing) edge of bed sitting  -     Comment, (Lower Body Dressing) adjust B socks  -               User Key  (r) = Recorded By, (t) = Taken By, (c) = Cosigned By      Initials Name Provider Type    Etelvina Smith, MITESH/L, CSRS Occupational Therapist                   Obj/Interventions       Row Name 04/28/25 1430          Sensory Assessment (Somatosensory)    Sensory Assessment (Somatosensory) UE sensation intact  -[facility] Name 04/28/25 1430          Vision Assessment/Intervention    Visual Impairment/Limitations WFL  -       Row Name 04/28/25 1430          Range of Motion Comprehensive    General Range of Motion bilateral upper extremity ROM WFL  -       Row Name 04/28/25 1430          Strength Comprehensive (MMT)    Comment, General Manual Muscle Testing (MMT) Assessment B UE strength 4/5  -[facility] Name 04/28/25 1430          Balance    Balance Assessment sitting static balance;sitting dynamic balance;standing static balance;standing dynamic balance  -     Static Sitting Balance independent  -     Dynamic Sitting Balance standby assist  -     Position, Sitting Balance unsupported;sitting edge of bed  -     Static Standing Balance contact guard  -     Dynamic Standing Balance contact guard  -     Position/Device Used, Standing Balance unsupported  -               User Key  (r) = Recorded By, (t) = Taken  By, (c) = Cosigned By      Initials Name Provider Type     Etelvina Nj OTR/L, CSRS Occupational Therapist                   Goals/Plan    No documentation.                  Clinical Impression       [facility] Name 04/28/25 1430          Pain Assessment    Pretreatment Pain Rating 6/10  -     Pain Location abdomen  -     Pain Side/Orientation other (see comments)  incisional  -     Pain Management Interventions exercise or physical activity utilized  -     Response to Pain Interventions no change per patient report  -[facility] 04/28/25 1430          Plan of Care Review    Plan of Care Reviewed With patient  -     Outcome Evaluation OT eval completed. Pt presents alert and oriented x4, sitting up EOB upon therapist entry to room. He reports at baseline being independent with all adls and mobility, residing at home with spouse. Today he was educated on spinal precautions, LSO fitting/mgt/wear schedule and adl modification. He was able to perform sit <> stand t/fs and functional mobility with CGA. Left sitting up in chair at end of session. OT to sign off at this time and will re-eval s/p 2nd part sx.  -[facility] Name 04/28/25 1430          Therapy Assessment/Plan (OT)    Criteria for Skilled Therapeutic Interventions Met (OT) no;does not meet criteria for skilled intervention  -     Therapy Frequency (OT) evaluation only  -[facility] Name 04/28/25 1430          Therapy Plan Review/Discharge Plan (OT)    Anticipated Discharge Disposition (OT) home with assist  -JW       Row Name 04/28/25 1430          Positioning and Restraints    Pre-Treatment Position in bed  -     Post Treatment Position chair  -JW     In Chair notified nsg;reclined;call light within reach;encouraged to call for assist;with family/caregiver;with brace  -               User Key  (r) = Recorded By, (t) = Taken By, (c) = Cosigned By      Initials Name Provider Type     Etelvina Nj, OTR/L, CSRS Occupational  Therapist                   Outcome Measures       Row Name 04/28/25 1430          How much help from another is currently needed...    Putting on and taking off regular lower body clothing? 3  -JW     Bathing (including washing, rinsing, and drying) 4  -JW     Toileting (which includes using toilet bed pan or urinal) 3  -JW     Putting on and taking off regular upper body clothing 4  -JW     Taking care of personal grooming (such as brushing teeth) 4  -JW     Eating meals 4  -JW     AM-PAC 6 Clicks Score (OT) 22  -JW       Row Name 04/28/25 1437 04/28/25 1335       How much help from another person do you currently need...    Turning from your back to your side while in flat bed without using bedrails? 3  -DS 3  -TM    Moving from lying on back to sitting on the side of a flat bed without bedrails? 3  -DS 3  -TM    Moving to and from a bed to a chair (including a wheelchair)? 3  -DS 3  -TM    Standing up from a chair using your arms (e.g., wheelchair, bedside chair)? 3  -DS 3  -TM    Climbing 3-5 steps with a railing? 3  -DS 3  -TM    To walk in hospital room? 3  -DS 3  -TM    AM-PAC 6 Clicks Score (PT) 18  -DS 18  -TM      Row Name 04/28/25 1430          Functional Assessment    Outcome Measure Options AM-PAC 6 Clicks Daily Activity (OT)  -               User Key  (r) = Recorded By, (t) = Taken By, (c) = Cosigned By      Initials Name Provider Type    TM Antonieta Lucero, RN Registered Nurse    Etelvina Smith OTR/L, CSRS Occupational Therapist    Maxine Yuen, RN Registered Nurse                  Occupational Therapy Education       Title: PT OT SLP Therapies (In Progress)       Topic: Occupational Therapy (In Progress)       Point: ADL training (Done)       Learning Progress Summary            Patient Acceptance, E, VU by SETH at 4/28/2025 1503                      Point: Precautions (Done)       Learning Progress Summary            Patient Acceptance, E, VU by SETH at 4/28/2025 1507                                       User Key       Initials Effective Dates Name Provider Type Discipline     11/15/24 -  Etelvina Nj OTR/L, KODAK Occupational Therapist OT                  OT Recommendation and Plan  Therapy Frequency (OT): evaluation only  Plan of Care Review  Plan of Care Reviewed With: patient  Outcome Evaluation: OT eval completed. Pt presents alert and oriented x4, sitting up EOB upon therapist entry to room. He reports at baseline being independent with all adls and mobility, residing at home with spouse. Today he was educated on spinal precautions, LSO fitting/mgt/wear schedule and adl modification. He was able to perform sit <> stand t/fs and functional mobility with CGA. Left sitting up in chair at end of session. OT to sign off at this time and will re-eval s/p 2nd part sx.  Plan of Care Reviewed With: patient  Outcome Evaluation: OT eval completed. Pt presents alert and oriented x4, sitting up EOB upon therapist entry to room. He reports at baseline being independent with all adls and mobility, residing at home with spouse. Today he was educated on spinal precautions, LSO fitting/mgt/wear schedule and adl modification. He was able to perform sit <> stand t/fs and functional mobility with CGA. Left sitting up in chair at end of session. OT to sign off at this time and will re-eval s/p 2nd part sx.     Time Calculation:         Time Calculation- OT       Row Name 04/28/25 1430             Time Calculation-     OT Start Time 1430  -      OT Stop Time 1458  -      OT Time Calculation (min) 28 min  -      OT Received On 04/28/25  -                User Key  (r) = Recorded By, (t) = Taken By, (c) = Cosigned By      Initials Name Provider Type     Etelvina Nj OTR/L, KODAK Occupational Therapist                  Therapy Charges for Today       Code Description Service Date Service Provider Modifiers Qty    [id number] HC OT EVAL LOW COMPLEXITY 2 4/28/2025 Etelvina Nj OTR/DARBY,  CSRS GO 1               OT Discharge Summary  Anticipated Discharge Disposition (OT): home with assist  Reason for Discharge: Discharge from facility  Outcomes Achieved: Refer to plan of care for updates on goals achieved  Discharge Destination: Home with assist    MITESH Pedersen/L, CSRS  4/28/2025

## 2025-04-28 NOTE — PLAN OF CARE
Goal Outcome Evaluation:  Plan of Care Reviewed With: patient           Outcome Evaluation: OT eval completed. Pt presents alert and oriented x4, sitting up EOB upon therapist entry to room. He reports at baseline being independent with all adls and mobility, residing at home with spouse. Today he was educated on spinal precautions, LSO fitting/mgt/wear schedule and adl modification. He was able to perform sit <> stand t/fs and functional mobility with CGA. Left sitting up in chair at end of session. OT to sign off at this time and will re-eval s/p 2nd part sx.    Anticipated Discharge Disposition (OT): home with assist

## 2025-04-29 LAB
ANION GAP SERPL CALCULATED.3IONS-SCNC: 8 MMOL/L (ref 5–15)
BASOPHILS # BLD AUTO: 0.02 10*3/MM3 (ref 0–0.2)
BASOPHILS NFR BLD AUTO: 0.3 % (ref 0–1.5)
BUN SERPL-MCNC: 6 MG/DL (ref 6–20)
BUN/CREAT SERPL: 7.4 (ref 7–25)
CALCIUM SPEC-SCNC: 8.2 MG/DL (ref 8.6–10.5)
CHLORIDE SERPL-SCNC: 104 MMOL/L (ref 98–107)
CO2 SERPL-SCNC: 27 MMOL/L (ref 22–29)
CREAT SERPL-MCNC: 0.81 MG/DL (ref 0.76–1.27)
DEPRECATED RDW RBC AUTO: 41.5 FL (ref 37–54)
EGFRCR SERPLBLD CKD-EPI 2021: 102.2 ML/MIN/1.73
EOSINOPHIL # BLD AUTO: 0.03 10*3/MM3 (ref 0–0.4)
EOSINOPHIL NFR BLD AUTO: 0.5 % (ref 0.3–6.2)
ERYTHROCYTE [DISTWIDTH] IN BLOOD BY AUTOMATED COUNT: 12.8 % (ref 12.3–15.4)
FOLATE SERPL-MCNC: 11.9 NG/ML (ref 4.78–24.2)
GLUCOSE SERPL-MCNC: 95 MG/DL (ref 65–99)
HCT VFR BLD AUTO: 31.4 % (ref 37.5–51)
HGB BLD-MCNC: 10.5 G/DL (ref 13–17.7)
IMM GRANULOCYTES # BLD AUTO: 0.02 10*3/MM3 (ref 0–0.05)
IMM GRANULOCYTES NFR BLD AUTO: 0.3 % (ref 0–0.5)
IRON 24H UR-MRATE: 30 MCG/DL (ref 59–158)
IRON SATN MFR SERPL: 15 % (ref 20–50)
LYMPHOCYTES # BLD AUTO: 1.24 10*3/MM3 (ref 0.7–3.1)
LYMPHOCYTES NFR BLD AUTO: 19.3 % (ref 19.6–45.3)
MCH RBC QN AUTO: 29.7 PG (ref 26.6–33)
MCHC RBC AUTO-ENTMCNC: 33.4 G/DL (ref 31.5–35.7)
MCV RBC AUTO: 89 FL (ref 79–97)
MONOCYTES # BLD AUTO: 0.52 10*3/MM3 (ref 0.1–0.9)
MONOCYTES NFR BLD AUTO: 8.1 % (ref 5–12)
NEUTROPHILS NFR BLD AUTO: 4.61 10*3/MM3 (ref 1.7–7)
NEUTROPHILS NFR BLD AUTO: 71.5 % (ref 42.7–76)
NRBC BLD AUTO-RTO: 0 /100 WBC (ref 0–0.2)
PLATELET # BLD AUTO: 237 10*3/MM3 (ref 140–450)
PMV BLD AUTO: 9.1 FL (ref 6–12)
POTASSIUM SERPL-SCNC: 3.2 MMOL/L (ref 3.5–5.2)
RBC # BLD AUTO: 3.53 10*6/MM3 (ref 4.14–5.8)
SODIUM SERPL-SCNC: 139 MMOL/L (ref 136–145)
TIBC SERPL-MCNC: 203 MCG/DL (ref 298–536)
TRANSFERRIN SERPL-MCNC: 136 MG/DL (ref 200–360)
VIT B12 BLD-MCNC: 713 PG/ML (ref 211–946)
WBC NRBC COR # BLD AUTO: 6.44 10*3/MM3 (ref 3.4–10.8)

## 2025-04-29 PROCEDURE — 94799 UNLISTED PULMONARY SVC/PX: CPT

## 2025-04-29 PROCEDURE — 97116 GAIT TRAINING THERAPY: CPT

## 2025-04-29 PROCEDURE — 84466 ASSAY OF TRANSFERRIN: CPT | Performed by: FAMILY MEDICINE

## 2025-04-29 PROCEDURE — 85025 COMPLETE CBC W/AUTO DIFF WBC: CPT | Performed by: ORTHOPAEDIC SURGERY

## 2025-04-29 PROCEDURE — 25010000002 CEFAZOLIN PER 500 MG: Performed by: ORTHOPAEDIC SURGERY

## 2025-04-29 PROCEDURE — 83540 ASSAY OF IRON: CPT | Performed by: FAMILY MEDICINE

## 2025-04-29 PROCEDURE — 80048 BASIC METABOLIC PNL TOTAL CA: CPT | Performed by: ORTHOPAEDIC SURGERY

## 2025-04-29 PROCEDURE — 82607 VITAMIN B-12: CPT | Performed by: FAMILY MEDICINE

## 2025-04-29 PROCEDURE — 94761 N-INVAS EAR/PLS OXIMETRY MLT: CPT

## 2025-04-29 PROCEDURE — 82746 ASSAY OF FOLIC ACID SERUM: CPT | Performed by: FAMILY MEDICINE

## 2025-04-29 RX ORDER — POTASSIUM CHLORIDE 20MEQ/15ML
40 LIQUID (ML) ORAL EVERY 4 HOURS
Status: COMPLETED | OUTPATIENT
Start: 2025-04-29 | End: 2025-04-29

## 2025-04-29 RX ADMIN — Medication 3 ML: at 19:58

## 2025-04-29 RX ADMIN — OXYCODONE AND ACETAMINOPHEN 1 TABLET: 325; 10 TABLET ORAL at 19:56

## 2025-04-29 RX ADMIN — BUDESONIDE AND FORMOTEROL FUMARATE DIHYDRATE 2 PUFF: 160; 4.5 AEROSOL RESPIRATORY (INHALATION) at 06:55

## 2025-04-29 RX ADMIN — OXYCODONE AND ACETAMINOPHEN 1 TABLET: 325; 10 TABLET ORAL at 11:23

## 2025-04-29 RX ADMIN — SENNOSIDES, DOCUSATE SODIUM 2 TABLET: 50; 8.6 TABLET, FILM COATED ORAL at 08:38

## 2025-04-29 RX ADMIN — CEFAZOLIN 2000 MG: 2 INJECTION, POWDER, FOR SOLUTION INTRAMUSCULAR; INTRAVENOUS at 03:03

## 2025-04-29 RX ADMIN — PANTOPRAZOLE SODIUM 40 MG: 40 TABLET, DELAYED RELEASE ORAL at 05:20

## 2025-04-29 RX ADMIN — OXYCODONE AND ACETAMINOPHEN 1 TABLET: 325; 10 TABLET ORAL at 05:20

## 2025-04-29 RX ADMIN — MINOXIDIL 5 MG: 2.5 TABLET ORAL at 08:38

## 2025-04-29 RX ADMIN — POTASSIUM CHLORIDE 40 MEQ: 20 SOLUTION ORAL at 15:01

## 2025-04-29 RX ADMIN — POTASSIUM CHLORIDE 40 MEQ: 20 SOLUTION ORAL at 08:37

## 2025-04-29 RX ADMIN — MINOCYCLINE HYDROCHLORIDE 100 MG: 50 CAPSULE ORAL at 19:57

## 2025-04-29 RX ADMIN — TAMSULOSIN HYDROCHLORIDE 0.4 MG: 0.4 CAPSULE ORAL at 08:38

## 2025-04-29 RX ADMIN — BUDESONIDE AND FORMOTEROL FUMARATE DIHYDRATE 2 PUFF: 160; 4.5 AEROSOL RESPIRATORY (INHALATION) at 22:58

## 2025-04-29 RX ADMIN — TAMSULOSIN HYDROCHLORIDE 0.4 MG: 0.4 CAPSULE ORAL at 19:58

## 2025-04-29 RX ADMIN — FLUTICASONE PROPIONATE 2 SPRAY: 50 SPRAY, METERED NASAL at 10:14

## 2025-04-29 RX ADMIN — Medication 3 ML: at 10:14

## 2025-04-29 RX ADMIN — MINOCYCLINE HYDROCHLORIDE 100 MG: 50 CAPSULE ORAL at 08:37

## 2025-04-29 RX ADMIN — Medication 400 MG: at 08:38

## 2025-04-29 RX ADMIN — SENNOSIDES, DOCUSATE SODIUM 2 TABLET: 50; 8.6 TABLET, FILM COATED ORAL at 19:57

## 2025-04-29 RX ADMIN — CITALOPRAM HYDROBROMIDE 40 MG: 20 TABLET ORAL at 08:38

## 2025-04-29 RX ADMIN — HYDROCHLOROTHIAZIDE 50 MG: 25 TABLET ORAL at 08:38

## 2025-04-29 RX ADMIN — POTASSIUM CHLORIDE 40 MEQ: 20 SOLUTION ORAL at 18:55

## 2025-04-29 NOTE — PROGRESS NOTES
Mr. Valladares is doing well this morning.  Pain controlled.  Denies any numbness, tingling, or shooting pains down his legs.  Motor function intact.  Anterior dressing clean dry and intact.  Plan to work with physical therapy this morning and today and return to surgery tomorrow for the second lateral stage of the procedure.

## 2025-04-29 NOTE — PAYOR COMM NOTE
"Viviane Valladares (59 y.o. Male)  [id number]   Admit  4/28     [facility]  phone   fax           Date of Birth   1965    Social Security Number       Address   [address] [place] [id number]    Home Phone   [phone]    MRN   [id number]       [employer]   [employer]    Marital Status                               Admission Date   4/28/2025    Admission Type   Elective    Admitting Provider   PAWEL Iyer MD    Attending Provider   PAWEL Iyer MD    Department, Room/Bed   [facility] 3A, 343/1       Discharge Date       Discharge Disposition       Discharge Destination                                 Attending Provider: PAWEL Iyer MD    Allergies: No Known Allergies    Isolation: None   Infection: None   Code Status: CPR    Ht: 185 cm (72.84\")   Wt: 110 kg (242 lb 4.6 oz)    Admission Cmt: None   Principal Problem: Lumbosacral radiculopathy [M54.17]                   Active Insurance as of 4/28/2025       Primary Coverage       Payor Plan Insurance Group Employer/Plan Group    [facility] COMMUNITY PLAN [facility] COMMUNITY PLAN [facility]       Payor Plan Address Payor Plan Phone Number Payor Plan Fax Number Effective Dates    [address]   1/1/2025 - None Entered    [facility] [phone]         Subscriber Name Subscriber Birth Date Member ID       VIVIANE VALLADARES 1965 [id number]                     Emergency Contacts        (Rel.) Home Phone Work Phone Mobile Phone    Candi Valladares (Spouse) -- -- [phone]    Kourtney Valladares (Daughter) -- -- [phone]                 History & Physical        PAWEL Iyer MD at 04/28/25 0706          The office history and physical exam was reviewed.  There are no changes.  Proceed with surgery as planned.    Electronically signed by PAWEL Iyer MD at 04/28/25 0707       H&P signed by [employer], [employer] at 04/24/25 0803   "       [Media Unavailable] Scan on 4/24/2025 0857 by [employer], [employer]: RECHECK OF CERVICAL SPINE, [facility], 03/28/2025          Electronically signed by [employer], [employer] at 04/24/25 0803       Current Facility-Administered Medications   Medication Dose Route Frequency Provider Last Rate Last Admin    acetaminophen (TYLENOL) tablet 650 mg  650 mg Oral Q4H PRN PAWEL Iyer MD        Or    acetaminophen (TYLENOL) 160 MG/5ML oral solution 650 mg  650 mg Oral Q4H PRN PAWEL Iyer MD        Or    acetaminophen (TYLENOL) suppository 650 mg  650 mg Rectal Q4H PRN PAWEL Iyer MD        albuterol sulfate HFA (PROVENTIL HFA;VENTOLIN HFA;PROAIR HFA) inhaler 2 puff  2 puff Inhalation PRN PAWEL Iyer MD        sennosides-docusate (PERICOLACE) 8.6-50 MG per tablet 2 tablet  2 tablet Oral BID PAWEL Iyer MD   2 tablet at 04/29/25 0838    And    polyethylene glycol (MIRALAX) packet 17 g  17 g Oral Daily PRN PAWEL Iyer MD        And    bisacodyl (DULCOLAX) EC tablet 5 mg  5 mg Oral Daily PRN PAWEL Iyer MD        And    bisacodyl (DULCOLAX) suppository 10 mg  10 mg Rectal Daily PRN PAWEL Iyer MD        budesonide-formoterol (SYMBICORT) 160-4.5 MCG/ACT inhaler 2 puff  2 puff Inhalation BID - RT PAWEL Iyer MD   2 puff at 04/29/25 0655    [START ON 4/30/2025] ceFAZolin 2000 mg IVPB in 100 mL NS (MBP)  2,000 mg Intravenous On Call to OR PAWEL Iyer MD        citalopram (CeleXA) tablet 40 mg  40 mg Oral Daily PAWEL Iyer MD   40 mg at 04/29/25 0838    fluticasone (FLONASE) 50 MCG/ACT nasal spray 2 spray  2 spray Nasal Daily PAWEL Iyer MD   2 spray at 04/28/25 1549    hydroCHLOROthiazide tablet 50 mg  50 mg Oral Daily PAWEL Iyer MD   50 mg at 04/29/25 0838    HYDROmorphone (DILAUDID) injection 0.5 mg  0.5 mg Intravenous Q3H PRN PAWEL Iyer MD        And    naloxone (NARCAN) injection 0.4 mg  0.4 mg Intravenous  Q5 Min PRN PAWEL Iyer MD        magnesium oxide (MAG-OX) tablet 400 mg  400 mg Oral Daily PAWEL Iyer MD   400 mg at 04/29/25 0838    methocarbamol (ROBAXIN) tablet 1,000 mg  1,000 mg Oral 4x Daily PRN PAWEL Iyer MD        minocycline (MINOCIN,DYNACIN) capsule 100 mg  100 mg Oral BID PAWEL Iyer MD   100 mg at 04/29/25 0837    minoxidil (LONITEN) tablet 5 mg  5 mg Oral Daily PAWEL Iyer MD   5 mg at 04/29/25 0838    ondansetron ODT (ZOFRAN-ODT) disintegrating tablet 4 mg  4 mg Oral Q6H PRN PAWEL Iyer MD        Or    ondansetron (ZOFRAN) injection 4 mg  4 mg Intravenous Q6H PRN PAWEL Iyer MD        oxyCODONE-acetaminophen (PERCOCET)  MG per tablet 1 tablet  1 tablet Oral Q4H PRN PAWEL Iyer MD   1 tablet at 04/29/25 0520    oxyCODONE-acetaminophen (PERCOCET) 7.5-325 MG per tablet 1 tablet  1 tablet Oral Q4H PRN PAWEL Iyer MD        pantoprazole (PROTONIX) EC tablet 40 mg  40 mg Oral Q AM PAWEL Iyer MD   40 mg at 04/29/25 0520    potassium chloride (KAYCIEL) 20 mEq/15 mL solution 40 mEq  40 mEq Oral Daily PAWEL Iyer MD   40 mEq at 04/29/25 0837    sodium chloride 0.9 % flush 10 mL  10 mL Intravenous PRN PAWEL Iyer MD        sodium chloride 0.9 % flush 3 mL  3 mL Intravenous Q12H PAWEL Iyer MD   3 mL at 04/28/25 2004    sodium chloride 0.9 % infusion 40 mL  40 mL Intravenous PRN PAWEL Iyer MD        sodium chloride 0.9 % infusion  100 mL/hr Intravenous Continuous PAWEL Iyer  mL/hr at 04/28/25 2332 100 mL/hr at 04/28/25 2332    tamsulosin (FLOMAX) 24 hr capsule 0.4 mg  0.4 mg Oral BID PAWEL Iyer MD   0.4 mg at 04/29/25 0838        Operative/Procedure Notes (last 48 hours)        PAWEL Iyer MD at 04/28/25 0708          Anterior decompression, ALIF with instrumentation procedure Note    Kurtis Valladares  4/28/2025    Pre-op Diagnosis:    1. Status post  hemilaminectomy, decompression, partial facetectomy decompression, bilateral L3-4, left L5-S1, 11/13/2023    2. Status post irrigation and debridement posterior lumbar wound, 12/13/2023    3. Status post revision irrigation and debridement posterior lumbar wound, 02/16/2024   4.  Residual chronic low back pain, left worse than right  5.  Residual left buttock, thigh, and leg radiculopathy  6.  Residual right lateral and anterior thigh radiculopathy  7.  Residual neurogenic claudication  8.  Degenerative disc disease L3-S1  9.  Facet arthropathy L2-S1  10.  Degenerative scoliosis, concave right T12-L4, concave left L5-S1  11.  Congenital short pedicle syndrome  12.  Probable autofusion L4-5  13.  Severe foraminal stenosis right L3-4, left L5-S1    Post-op Diagnosis:    same    Procedure/CPT® Codes:     1. Anterior discectomy decompression with bilateral neural foraminotomy L5-S1  2. Anterior lumbar interbody fusion L5-S1  3. Anterior spinal instrumentation L5-S1 (ATEC anterior plate and screws)  4. Use of titanium interbody biomechanical device for fusion L5-S1 (ATEC titanium spacer and screws)  5. Use of allograft bone matrix for fusion L5-S1 (Induce NMP Fibers)  6. Use of fluoroscopy for confirmation of surgical level, placement of interbody spacer and instrumentation  7. Intraoperative neural monitoring    Spinal Surgery Levels Completed:1 Level      Anesthesia: General     Surgeon: FILIBERTO Iyer MD     Co Surgeon: Dr. Montana Rangel D.O.     Assistant: Huang Avila PA-C     Estimated Blood Loss: 50 mL     Complications: None     Condition: Stable to PACU.     Indications:     The patient is a 59-year-old who sees Shira Worthington NP for medical issues.  He originally presented to the office with chronic low back pain along with mainly left leg radiculopathy and symptoms consistent with neurogenic claudication.  While we briefly discussed a decompression and fusion procedure, we elected to proceed with a  smaller decompression procedure alone at L3-4 and L5-S1.  This procedure was performed on 11/13/2023.  Unfortunately, the surgery was complicated by a draining infected wound that required two irrigation and debridement procedures.  He presented back to the office with residual chronic low back pain as well as residual leg radiculopathy and symptoms consistent with neurogenic claudication.  Repeat imaging studies revealed degenerative disc disease from L3-S1 with facet arthropathy from L2-S1 and congenital short pedicles syndrome.  He was also noted to have a degenerative scoliosis concave to the right from T12-L4 and concave to the left at L5-S1.  He was likely to have a autofusion at L4-5.  These findings created severe foraminal stenosis on the right side of L3-4 on the left side of L5-S1.    After failing all conservative measures, it was mutually decided that surgery would be the best option.  Risks, benefits, and complications of surgery were discussed in detail. The patient appeared well informed and wished to proceed. We specifically discussed the risk of infection, blood loss, nerve root injury, CSF leak, and the possibility of incomplete resolution of symptoms. We also discussed the possible risk of a nonunion and the potential need for additional surgery in the event of a pseudoarthrosis or hardware failure.    We elected to proceed with a staged operation.  Today we are performing an anterior decompression and fusion of L5-S1, it is planned that we will be returning to surgery for a second lateral procedure in a few days to address the L3-4 level.  The patient will also require a final posterior procedure involving a posterior spinal fusion with instrumentation.     Operative Procedure:     After obtaining informed consent and verifying the correct operative level, the patient was brought to the operating room and placed supine on an operating table. A general anesthetic was provided by the anesthesia  service with the assistance of an endotracheal tube. Once this was appropriately positioned and secured, the anterior abdominal region was prepped and draped in usual sterile fashion. A surgical timeout was taken to confirm this was the correct patient, we were working at the correct level, and that preoperative antibiotics were given in a timely fashion.     At this point, Dr. Montana Rangel D.O. provided vascular access to the L5-S1 level. He performed a left sided anterior retroperitoneal approach to the L5-S1 segment. Please see his separate dictated operative report regarding the details on the approach itself.  When I entered the procedure, self retaining retractors were already in position with excellent exposure of the L5-S1 disc space.     After confirming we were at the correct level using fluoroscopy, I used a long handle 10 blade scalpel to cut into the L5-S1 disc space. A Law elevator was used to remove disc material off of the endplates. Disc material was retrieved using pituitaries and Kerrisons. A disc space distractor was then placed into the L5-S1 disc space and I used curettes to remove posterior disc material. Kerrisons were used to remove posterior osteophytes across the endplates of L5 and S1. There was high-grade stenosis centrally but also foraminally, especially on the left. I then performed a bilateral neural foraminotomy with Kerrisons and curettes. The decompression was much more involved than what is usually required for an anterior lumbar interbody fusion by itself and required significantly more time to perform.  This was due to the severe disc space collapse and high-grade foraminal stenosis bilaterally, but especially on the left.     After the decompression was completed bilaterally and centrally, I used a series of endplate scrapers to prepare the endplates for interbody fusion. Trial spacers were then malleted into position and it was felt that a large 18 mm titanium spacer with  20 degrees lordosis from the ATEC instrumentation set would be the best fit to restore disc height also restoring foraminal height and providing some indirect decompression. The disc space was then thoroughly irrigated with saline solution.  Gelfoam powder with thrombin was used to control epidural bleeding.     A large 18 mm titanium spacer from the ATEC instrumentation set was then packed as tightly as possible with an allograft bone matrix called Induce NMP Fibers. This spacer was then malleted into the L5-S1 disc space under fluoroscopic guidance. It was placed as an interbody biomechanical device to assist with fusion.  I then placed an 8.5 mm x 30 mm graft bolt through the spacer into L5 and a 5.0 mm x 30 mm screw through the spacer into S1.  The screws were placed to help maintain position of the spacer as well as to assist with the fusion process.     A 4-hole anterior plate from the ATEC instrumentation set was then chosen. The 4 holes were drilled and four 6.0 mm x 30 mm screws were used to fix the plate across the L5-S1 segment augmenting the fusion.      We then inspected the entire operative field for any signs of bleeding.  Bleeding was once again controlled using thrombin with Gelfoam powder and bipolar cautery.  Once we ensured that adequate hemostasis was accomplished, final fluoroscopy imaging was taken to confirm adequate position of our implants. There was excellent restoration of disc height and the plate and screw construct appeared to be adequately positioned across L5-S1.    I then placed a VersaWrap sheet over the anterior instrumentation to hopefully decrease the risk of postoperative adhesions and scarring.     Please see Dr. Montana Rangel's separate dictated operative report regarding the closure of the wound. Once this was accomplished, the patient was extubated and sent to the recovery room in good stable condition. We estimated blood loss to be approximately 50 mL and the patient  remained hemodynamically stable during the procedure.     Intraoperative neuro monitoring was ordered and carried out throughout the procedure to add an increased level of safety for the patient.  The interpreting physician was available by means of real-time continuous, bidirectional, remote audio and visual communication as needed throughout the entire procedure.  Modalities used during the procedure included SSEP, EEG, MEP, EMG, and TOF.  There were no neuro monitoring signal changes during the procedure.    Dr. Montana Rangel D.O. provided vascular access to the L5-S1 level and acted as a co-surgeon in this fashion.  Huang Avila PA-C provided critical assistance during the decompression at L5-S1 as well as during the placement of the titanium spacer, bone graft and instrumentation to obtain a fusion across L5-S1.    FILIBERTO Iyer MD    Date: 4/28/2025  Time: 15:26 CDT    Electronically signed by PAWEL Iyer MD at 04/28/25 1526       Montana Rangel DO at 04/28/25 1049          Kurtis Valladares  4/28/2025     PREOPERATIVE DIAGNOSIS:   1. Status post hemilaminectomy, decompression, partial facetectomy decompression, bilateral L3-4, left L5-S1, 11/13/2023    2. Status post irrigation and debridement posterior lumbar wound, 12/13/2023    3. Status post revision irrigation and debridement posterior lumbar wound, 02/16/2024   4.  Residual chronic low back pain, left worse than right  5.  Residual left buttock, thigh, and leg radiculopathy  6.  Residual right lateral and anterior thigh radiculopathy  7.  Residual neurogenic claudication  8.  Degenerative disc disease L3-S1  9.  Facet arthropathy L2-S1  10.  Degenerative scoliosis, concave right T12-L4, concave left L5-S1  11.  Congenital short pedicle syndrome  12.  Probable autofusion L4-5  13.  Severe foraminal stenosis right L3-4, left L5-S1     POSTOPERATIVE DIAGNOSIS: same     PROCEDURE PERFORMED:   1.  Anterior lumbar interbody fusion of  L5-S1 with instrumentation  2.  Placement of a 13 cm Prevena wound VAC     SURGEON: Montana Rangel DO   COSURGEON: Segun Iyer MD     ANESTHESIA: General.    PREPARATION: Routine.    STAFF: Circulator: Kishore Cruz RN; Kandis Zendejas RN  Scrub Person: Etelvina Glover; Nicole Valenzuela CST; Kiera Delaney  Vendor Representative: eVe Garcia; Alexandru Black  Assistant: Primo Avila PA-C    ESTIMATED BLOOD LOSS: 50 mL    SPECIMENS: None    COMPLICATIONS: None    INDICATIONS: Kurtis Valladares is a 59 y.o. male who you are currently following for chronic back pain.  Kurtis Valladares is scheduled for anterior lumbar interbody fusion of L5-S1 with Dr. Iyer on 4/28/25.  The patient denies any history of DVT.  His dad did die from AAA. The indications, risks, and possible complications of the procedure were explained to the patient, who voiced understanding and wished to proceed with surgery.     PROCEDURE IN DETAIL:   The patient was taken to the operating room and placed on the operating table in a supine position. After general anesthesia was obtained, the abdomen was prepped and draped in a sterile manner.  A transverse incision was then made in the left lower quadrant.  Careful dissection was made down through the subcutaneous tissues using the Bovie cautery to ensure hemostasis.  Any crossing veins were ligated with 3-0 silk suture and hemoclips.  The rectus fascia was identified.  It was incised with the Bovie cautery.  Kocher clamps are placed on each side of the rectus fascia and subfascial planes were established in a cephalad and caudad direction.  Once the subfascial planes were established the attention was then turned to the left rectus muscle.  Blunt mobilization was made of the left rectus muscle including its blood supply medially and to the right.  Once it was fully mobilized the attention was then turned laterally to the peritoneal reflection.  Entrance into the  retroperitoneal space was established with the use of a sponge stick and the Bovie cautery.  Continued blunt mobilization was made with my hand using a finger sweeping motion moving the peritoneal contents and sacral fat pad medially and to the right.  Once it was fully mobilized the Brau-Pastrana retractor system was set up.  The retractor blades were set in place.  The left iliac vein was then carefully dissected free and placed safely behind the retractor blade.  The sacral vessels were carefully taken down with hemoclips.  At this point full exposure was established of the L5-S1 disc space.  The next part of the case will be dictated by Dr. Iyer.  Upon completion of Dr. Iyer's part of the case the wound bed was irrigated with antibiotic saline and hemostasis was observed. VersaWrap was carefully placed over the L5-S1 disc space repair.  The retractor blades were carefully taken out one at a time.  The structures were then placed back in their normal anatomic positions.  The rectus fascia was then closed with a #1 PDS in a running fashion to meet in the midline.  The deep layers were closed with a 2-0 Vicryl in a running fashion.  The subcutaneous layers were closed with a 3-0 Vicryl in a running fashion.  The skin was then reapproximated using a 4-0 Monocryl in a subcuticular fashion.  The wound was then cleaned.  A 13 cm Prevena wound VAC was applied. The patient tolerated the procedure well. Sponge and needle counts were correct. The patient was then awakened and extubated in the operating room and taken to the recovery room in good condition.    Montana Rangel DO    Date: 4/28/2025 Time: 12:50 CDT    Electronically signed by Montana Rangel DO at 04/28/25 5313          Physician Progress Notes (last 24 hours)        PAWEL Iyer MD at 04/29/25 6277          Mr. Valladares is doing well this morning.  Pain controlled.  Denies any numbness, tingling, or shooting pains down his legs.  Motor  function intact.  Anterior dressing clean dry and intact.  Plan to work with physical therapy this morning and today and return to surgery tomorrow for the second lateral stage of the procedure.    Electronically signed by PAWEL Iyer MD at 04/29/25 0740          Consult Notes (last 24 hours)        Maged Quigley MD at 04/29/25 0738        Consult Orders    1. Inpatient [facility] Practice Consult [[id number]] ordered by PAWEL Iyer MD at 04/28/25 1228                     Consult Note    Referring Provider: Dr. Iyer  Reason for Consultation: Medical management    Patient Care Team:  Shira Worthington APRN as PCP - General (Nurse Practitioner)  Bill Pruitt APRN as Nurse Practitioner (Orthopedic Surgery)  Bill Pruitt APRN as Nurse Practitioner (Orthopedic Surgery)  Shira Worthington APRN as Referring Physician (Nurse Practitioner)  Marko Barba MD as Cardiologist (Cardiology)    Chief complaint low back pain    Subjective .     History of present illness:  The patient presents today for surgical correction after failing conservative management.  Outpatient work-up has been reviewed through provided outpatient notes per attending.  They have been through anti-inflammatories, muscle relaxers, and pain medication.  The pain has progressed to the point in time where it is affecting their activities of daily living and after being explained all their options, elected to undergo surgical correction.  Their primary care physician does not attend here at [facility]; therefore, I have been asked to take care of their primary medical needs in the perioperative period.  The postoperative pain is as expected.  There are no other precipitating or relieving factors. I have been requested by the Attending Physician to provide Medical Consultation in the perioperative period. The patient understands my role in their hospitalization and agrees with my treatment plan. They understand the  importance of follow up with their PCP upon discharge  from [facility] for any concerns or abnormalities.  All questions were encouraged and answered to the best of my ability.       Postop day #1 from first step of a two-step decompressive back surgery.  Pain as expected.  Ready and willing to work with PT/OT.    REVIEW OF SYSTEMS:  See HPI           History    Past Medical History:   Diagnosis Date    Acute bronchitis     not since 2005    Arthritis     Asthma     Dyspnea     Enlarged prostate     GERD (gastroesophageal reflux disease)     Hypertension     Pain in lower limb     Sleep apnea     history of, pt states he thinks it has resolved    Sprain of ankle     Wound dehiscence, surgical 02/2024    lumbar wound     Past Surgical History:   Procedure Laterality Date    CARDIAC CATHETERIZATION N/A 01/22/2020    Procedure: Left Heart Cath/PCI if indicated;  Surgeon: Hawa Dominguez MD;  Location: [facility] CATH INVASIVE LOCATION;  Service: Cardiology    GASTRIC SLEEVE LAPAROSCOPIC  2020    INCISION AND DRAINAGE POSTERIOR SPINE N/A 12/13/2023    Procedure: IRRIGATION AND DEBRIDEMENT POSTERIOR LUMBAR WOUND;  Surgeon: PAWEL Iyer MD;  Location:  [facility] OR;  Service: Orthopedic Spine;  Laterality: N/A;    INCISION AND DRAINAGE POSTERIOR SPINE N/A 02/16/2024    Procedure: IRRIGATION AND DEBRIDEMENT LUMBAR WOUND DEHISCENCE;  Surgeon: PAWEL Iyer MD;  Location:  [facility] OR;  Service: Orthopedic Spine;  Laterality: N/A;    LUMBAR LAMINECTOMY WITH FUSION N/A 11/13/2023    Procedure: HEMILAMINECTOMY, DECOMPRESSION, PARTIAL FACETECTOMY, FORAMINOTOMY BILATERAL L3-4, LEFT L5-S1;  Surgeon: PAWEL Iyer MD;  Location:  [facility] OR;  Service: Orthopedic Spine;  Laterality: N/A;     Family History   Problem Relation Age of Onset    Hypertension Other     Hypertension Mother     Hypertension Father      Social History     Tobacco Use    Smoking status: Former     Current packs/day: 0.00     Types: Cigarettes      Quit date: 2005     Years since quittin.3    Smokeless tobacco: Never   Vaping Use    Vaping status: Never Used   Substance Use Topics    Alcohol use: Yes     Comment: OCC    Drug use: No     Medications Prior to Admission   Medication Sig Dispense Refill Last Dose/Taking    Advair Diskus 100-50 MCG/ACT DISKUS Inhale 1 puff 2 (Two) Times a Day.   2025    citalopram (CeleXA) 40 MG tablet Take 1 tablet by mouth Daily.   2025    DICLOFENAC SODIUM ER PO Take 150 mg by mouth Daily.   2025    finasteride (PROPECIA) 1 MG tablet Take 1 tablet by mouth Daily.   2025    fluticasone (FLONASE) 50 MCG/ACT nasal spray Administer 2 sprays into the nostril(s) as directed by provider Daily.   2025    hydroCHLOROthiazide 50 MG tablet Take 1 tablet by mouth 2 (Two) Times a Day.   Taking    loratadine (CLARITIN) 10 MG tablet Take 1 tablet by mouth Daily.   2025    magnesium oxide (MAG-OX) 400 MG tablet Take 1 tablet by mouth Daily.   2025    minocycline (MINOCIN,DYNACIN) 100 MG capsule Take 1 capsule by mouth 2 (Two) Times a Day.   2025    minoxidil (LONITEN) 2.5 MG tablet Take 1 tablet by mouth Daily. (Patient taking differently: Take 2 tablets by mouth 2 (Two) Times a Day.)   2025    multivitamin with minerals tablet tablet Take 1 tablet by mouth Daily.   2025    omeprazole (priLOSEC) 20 MG capsule Take 1 capsule by mouth Daily.   2025 at  4:00 AM    Potassium Chloride 40 MEQ/15ML (20%) solution Take 15 mL by mouth Daily.   2025    tamsulosin (FLOMAX) 0.4 MG capsule 24 hr capsule Take 1 capsule by mouth 2 (Two) Times a Day. (Patient taking differently: Take 2 capsules by mouth Daily.)   2025    albuterol sulfate  (90 Base) MCG/ACT inhaler Inhale 2 puffs As Needed.  2     oxyCODONE-acetaminophen (PERCOCET)  MG per tablet Take 1 tablet by mouth Every 8 (Eight) Hours As Needed for Moderate Pain or Severe Pain.          Allergies:  Patient has no  known allergies.    Objective     Vital Signs   Temp:  [97 °F (36.1 °C)-98.9 °F (37.2 °C)] 98.9 °F (37.2 °C)  Heart Rate:  [50-90] 88  Resp:  [14-18] 16  BP: (116-139)/(57-94) 130/78          Physical Exam:  Constitutional: oriented to person, place, and time. appears well-developed.   Head: Normocephalic and atraumatic.   Eyes: Pupils are equal, round, and reactive to light.  No icterus or erythema  Neck: Neck supple.  Without masses or carotid bruit  Cardiovascular: Regular rhythm and normal heart sounds.  No significant lift, rub or murmur noted  Pulmonary/Chest: Effort normal and breath sounds normal. CTAB, encourage deep breathing.  Abdominal: Soft. Bowel sounds are normal to hypoactive. No significant distension. There is no rebound and no guarding.   Musculoskeletal: Normal range of motion and no edema or tenderness outside of surgical area.   Neurological: Pt is alert and oriented to person, place, and time.  normal reflexes present.  Somewhat sedated from anesthesia and pain medicine noted  Skin: Skin is warm and dry.  No new rashes of concern.    Results Review:   I reviewed the patient's new imaging results and agree with the interpretation.      Assessment & Plan       Lumbosacral radiculopathy    Lumbar radiculopathy    Lumbosacral disc disease    Lumbar spondylosis    BPH with obstruction/lower urinary tract symptoms    MARTI (generalized anxiety disorder)    Drug-induced constipation      Constipation-educate patient about the ill effects of anesthesia and narcotic pain medication on the normal peristalsis of the gut.  Encourage judicious use of pain medication and early ambulation to aid in bowel functioning returning to normal.  We will start with MiraLAX 1 capful BID until BM, then decrease to 1 x a day, then can step up to a prokinetic which can be used for opioid-induced constipation, and ultimately end up with Relistor 12 mcg subq every 48 hours to block the effect of narcotics on the gut.  Our  plan is to soften the stools so after surgical intervention if stimulation is needed with magnesium citrate and or Dulcolax suppository the stool will move much easier.  Encourage water consumption to help soften the stool as well.     GERD-exacerbated by pain medications and anesthesia, will add PPI and or H2 blocker as needed and can step up to Carafate 1 gm po before each meal and nightly. Patient educated about smaller portions with more frequent feedings. Patient also instructed on early mobilization to help with return of normal peristalsis of gut.       History of wound dehiscence-keep a close eye on his wound and follow-up with his white count recheck in the morning.    BPH with lower urinary tract symptoms-aggressive bowel regimen, judicious use of opioid analgesics.  Early ambulation.      Explained to patient and staff that we were consulted for medical management during their acute care hospitalization. The Medical Consultation was requested by the Attending Physician. The patient has been recommended to f/u with their regular primary care provider concerning any further treatment and review of abnormalities found during their hospitalization at [facility]. They agree with the treatment plan as well as understand our role in their hospitalization. All questions were encouraged and answered to the best of my ability.         I discussed the patient's findings and my recommendations with patient, nursing staff, and consulting provider    Maged Quigley MD  04/29/25  07:38 CDT             Electronically signed by Maged Quigley MD at 04/29/25 8011

## 2025-04-29 NOTE — CONSULTS
Consult Note    Referring Provider: Dr. Iyer  Reason for Consultation: Medical management    Patient Care Team:  Shira Worthington APRN as PCP - General (Nurse Practitioner)  Bill Pruitt APRN as Nurse Practitioner (Orthopedic Surgery)  Bill Pruitt APRN as Nurse Practitioner (Orthopedic Surgery)  Shira Worthington APRN as Referring Physician (Nurse Practitioner)  Marko Barba MD as Cardiologist (Cardiology)    Chief complaint low back pain    Subjective .     History of present illness:  The patient presents today for surgical correction after failing conservative management.  Outpatient work-up has been reviewed through provided outpatient notes per attending.  They have been through anti-inflammatories, muscle relaxers, and pain medication.  The pain has progressed to the point in time where it is affecting their activities of daily living and after being explained all their options, elected to undergo surgical correction.  Their primary care physician does not attend here at [facility]; therefore, I have been asked to take care of their primary medical needs in the perioperative period.  The postoperative pain is as expected.  There are no other precipitating or relieving factors. I have been requested by the Attending Physician to provide Medical Consultation in the perioperative period. The patient understands my role in their hospitalization and agrees with my treatment plan. They understand the importance of follow up with their PCP upon discharge  from [facility] for any concerns or abnormalities.  All questions were encouraged and answered to the best of my ability.       Postop day #1 from first step of a two-step decompressive back surgery.  Pain as expected.  Ready and willing to work with PT/OT.    REVIEW OF SYSTEMS:  See HPI           History    Past Medical History:   Diagnosis Date    Acute bronchitis     not since 2005    Arthritis     Asthma     Dyspnea      Enlarged prostate     GERD (gastroesophageal reflux disease)     Hypertension     Pain in lower limb     Sleep apnea     history of, pt states he thinks it has resolved    Sprain of ankle     Wound dehiscence, surgical 2024    lumbar wound     Past Surgical History:   Procedure Laterality Date    CARDIAC CATHETERIZATION N/A 2020    Procedure: Left Heart Cath/PCI if indicated;  Surgeon: Hawa Dominguez MD;  Location:  [facility];  Service: Cardiology    GASTRIC SLEEVE LAPAROSCOPIC      INCISION AND DRAINAGE POSTERIOR SPINE N/A 2023    Procedure: IRRIGATION AND DEBRIDEMENT POSTERIOR LUMBAR WOUND;  Surgeon: PAWEL Iyer MD;  Location:  [facility] OR;  Service: Orthopedic Spine;  Laterality: N/A;    INCISION AND DRAINAGE POSTERIOR SPINE N/A 2024    Procedure: IRRIGATION AND DEBRIDEMENT LUMBAR WOUND DEHISCENCE;  Surgeon: PAWEL Iyer MD;  Location:  [facility] OR;  Service: Orthopedic Spine;  Laterality: N/A;    LUMBAR LAMINECTOMY WITH FUSION N/A 2023    Procedure: HEMILAMINECTOMY, DECOMPRESSION, PARTIAL FACETECTOMY, FORAMINOTOMY BILATERAL L3-4, LEFT L5-S1;  Surgeon: PAWEL Iyer MD;  Location:  [facility] OR;  Service: Orthopedic Spine;  Laterality: N/A;     Family History   Problem Relation Age of Onset    Hypertension Other     Hypertension Mother     Hypertension Father      Social History     Tobacco Use    Smoking status: Former     Current packs/day: 0.00     Types: Cigarettes     Quit date: 2005     Years since quittin.3    Smokeless tobacco: Never   Vaping Use    Vaping status: Never Used   Substance Use Topics    Alcohol use: Yes     Comment: OCC    Drug use: No     Medications Prior to Admission   Medication Sig Dispense Refill Last Dose/Taking    Advair Diskus 100-50 MCG/ACT DISKUS Inhale 1 puff 2 (Two) Times a Day.   2025    citalopram (CeleXA) 40 MG tablet Take 1 tablet by mouth Daily.   2025    DICLOFENAC SODIUM ER PO Take 150  mg by mouth Daily.   4/27/2025    finasteride (PROPECIA) 1 MG tablet Take 1 tablet by mouth Daily.   4/27/2025    fluticasone (FLONASE) 50 MCG/ACT nasal spray Administer 2 sprays into the nostril(s) as directed by provider Daily.   4/27/2025    hydroCHLOROthiazide 50 MG tablet Take 1 tablet by mouth 2 (Two) Times a Day.   Taking    loratadine (CLARITIN) 10 MG tablet Take 1 tablet by mouth Daily.   4/27/2025    magnesium oxide (MAG-OX) 400 MG tablet Take 1 tablet by mouth Daily.   4/27/2025    minocycline (MINOCIN,DYNACIN) 100 MG capsule Take 1 capsule by mouth 2 (Two) Times a Day.   4/27/2025    minoxidil (LONITEN) 2.5 MG tablet Take 1 tablet by mouth Daily. (Patient taking differently: Take 2 tablets by mouth 2 (Two) Times a Day.)   4/27/2025    multivitamin with minerals tablet tablet Take 1 tablet by mouth Daily.   4/27/2025    omeprazole (priLOSEC) 20 MG capsule Take 1 capsule by mouth Daily.   4/28/2025 at  4:00 AM    Potassium Chloride 40 MEQ/15ML (20%) solution Take 15 mL by mouth Daily.   4/27/2025    tamsulosin (FLOMAX) 0.4 MG capsule 24 hr capsule Take 1 capsule by mouth 2 (Two) Times a Day. (Patient taking differently: Take 2 capsules by mouth Daily.)   4/27/2025    albuterol sulfate  (90 Base) MCG/ACT inhaler Inhale 2 puffs As Needed.  2     oxyCODONE-acetaminophen (PERCOCET)  MG per tablet Take 1 tablet by mouth Every 8 (Eight) Hours As Needed for Moderate Pain or Severe Pain.          Allergies:  Patient has no known allergies.    Objective     Vital Signs   Temp:  [97 °F (36.1 °C)-98.9 °F (37.2 °C)] 98.9 °F (37.2 °C)  Heart Rate:  [50-90] 88  Resp:  [14-18] 16  BP: (116-139)/(57-94) 130/78          Physical Exam:  Constitutional: oriented to person, place, and time. appears well-developed.   Head: Normocephalic and atraumatic.   Eyes: Pupils are equal, round, and reactive to light.  No icterus or erythema  Neck: Neck supple.  Without masses or carotid bruit  Cardiovascular: Regular rhythm  and normal heart sounds.  No significant lift, rub or murmur noted  Pulmonary/Chest: Effort normal and breath sounds normal. CTAB, encourage deep breathing.  Abdominal: Soft. Bowel sounds are normal to hypoactive. No significant distension. There is no rebound and no guarding.   Musculoskeletal: Normal range of motion and no edema or tenderness outside of surgical area.   Neurological: Pt is alert and oriented to person, place, and time.  normal reflexes present.  Somewhat sedated from anesthesia and pain medicine noted  Skin: Skin is warm and dry.  No new rashes of concern.    Results Review:   I reviewed the patient's new imaging results and agree with the interpretation.      Assessment & Plan       Lumbosacral radiculopathy    Lumbar radiculopathy    Lumbosacral disc disease    Lumbar spondylosis    BPH with obstruction/lower urinary tract symptoms    MARTI (generalized anxiety disorder)    Drug-induced constipation      Constipation-educate patient about the ill effects of anesthesia and narcotic pain medication on the normal peristalsis of the gut.  Encourage judicious use of pain medication and early ambulation to aid in bowel functioning returning to normal.  We will start with MiraLAX 1 capful BID until BM, then decrease to 1 x a day, then can step up to a prokinetic which can be used for opioid-induced constipation, and ultimately end up with Relistor 12 mcg subq every 48 hours to block the effect of narcotics on the gut.  Our plan is to soften the stools so after surgical intervention if stimulation is needed with magnesium citrate and or Dulcolax suppository the stool will move much easier.  Encourage water consumption to help soften the stool as well.     GERD-exacerbated by pain medications and anesthesia, will add PPI and or H2 blocker as needed and can step up to Carafate 1 gm po before each meal and nightly. Patient educated about smaller portions with more frequent feedings. Patient also instructed  on early mobilization to help with return of normal peristalsis of gut.       History of wound dehiscence-keep a close eye on his wound and follow-up with his white count recheck in the morning.    BPH with lower urinary tract symptoms-aggressive bowel regimen, judicious use of opioid analgesics.  Early ambulation.      Explained to patient and staff that we were consulted for medical management during their acute care hospitalization. The Medical Consultation was requested by the Attending Physician. The patient has been recommended to f/u with their regular primary care provider concerning any further treatment and review of abnormalities found during their hospitalization at [facility]. They agree with the treatment plan as well as understand our role in their hospitalization. All questions were encouraged and answered to the best of my ability.         I discussed the patient's findings and my recommendations with patient, nursing staff, and consulting provider    Maged Quigley MD  04/29/25  07:38 CDT

## 2025-04-29 NOTE — PLAN OF CARE
Goal Outcome Evaluation:           Progress: improving             Patient alert and responsive able to make needs known. Patient has been up in chair since to floor from surgery. Denies pain. Ambulated to bathroom with assist and LSO brace. No acute distress noted.

## 2025-04-29 NOTE — THERAPY TREATMENT NOTE
Acute Care - Physical Therapy Treatment Note  [facility]     Patient Name: Kurtis Valladares  : 1965  MRN: [id number]  Today's Date: 2025      Visit Dx:     ICD-10-CM ICD-9-CM   1. Impaired mobility [Z74.09]  Z74.09 799.89     Patient Active Problem List   Diagnosis    Right knee pain    Primary osteoarthritis of right knee    Effusion of right knee    Pain of right heel    Postcalcaneal bursitis of right foot    Pre-op evaluation    Abnormal nuclear stress test    Preop cardiovascular exam    Lumbar radiculopathy    Hypokalemia    Foraminal stenosis of lumbar region, severe    Bradycardia    Lumbosacral disc disease    Lumbar spondylosis    Lumbosacral radiculopathy    BPH with obstruction/lower urinary tract symptoms    MARTI (generalized anxiety disorder)    Drug-induced constipation     Past Medical History:   Diagnosis Date    Acute bronchitis     not since     Arthritis     Asthma     Dyspnea     Enlarged prostate     GERD (gastroesophageal reflux disease)     Hypertension     Pain in lower limb     Sleep apnea     history of, pt states he thinks it has resolved    Sprain of ankle     Wound dehiscence, surgical 2024    lumbar wound     Past Surgical History:   Procedure Laterality Date    ANTERIOR LUMBAR EXPOSURE N/A 2025    Procedure: ANTERIOR LUMBAR EXPOSURE;  Surgeon: Montana Rangel DO;  Location:  [facility] OR;  Service: Vascular;  Laterality: N/A;    CARDIAC CATHETERIZATION N/A 2020    Procedure: Left Heart Cath/PCI if indicated;  Surgeon: Hawa Dominguez MD;  Location:  [facility] CATH [facility];  Service: Cardiology    GASTRIC SLEEVE LAPAROSCOPIC      INCISION AND DRAINAGE POSTERIOR SPINE N/A 2023    Procedure: IRRIGATION AND DEBRIDEMENT POSTERIOR LUMBAR WOUND;  Surgeon: PAWEL Iyer MD;  Location:  [facility] OR;  Service: Orthopedic Spine;  Laterality: N/A;    INCISION AND DRAINAGE POSTERIOR SPINE N/A 2024    Procedure: IRRIGATION AND  DEBRIDEMENT LUMBAR WOUND DEHISCENCE;  Surgeon: PAWEL Iyer MD;  Location:  [facility] OR;  Service: Orthopedic Spine;  Laterality: N/A;    LUMBAR FUSION N/A 4/28/2025    Procedure: AMTERIOR DECOMPRESSION, ANTERIOR LUMBAR INTERBODY FUSION WITH INSTRUMENTATION L5-S1;  Surgeon: PAWEL Iyer MD;  Location:  [facility] OR;  Service: Orthopedic Spine;  Laterality: N/A;    LUMBAR LAMINECTOMY WITH FUSION N/A 11/13/2023    Procedure: HEMILAMINECTOMY, DECOMPRESSION, PARTIAL FACETECTOMY, FORAMINOTOMY BILATERAL L3-4, LEFT L5-S1;  Surgeon: PAWEL Iyer MD;  Location:  [facility] OR;  Service: Orthopedic Spine;  Laterality: N/A;     PT Assessment (Last 12 Hours)       PT Evaluation and Treatment       [facility] Name 04/29/25 0815          Physical Therapy Time and Intention    Subjective Information no complaints  -     Document Type therapy note (daily note)  -     Mode of Treatment physical therapy  -[facility] Name 04/29/25 0815          General Information    Existing Precautions/Restrictions fall;LSO;spinal;brace worn when out of bed  -[facility] Name 04/29/25 0815          Pain    Pretreatment Pain Rating 1/10  -     Posttreatment Pain Rating 1/10  -     Pain Location abdomen  -     Pain Management Interventions premedicated for activity  -     Response to Pain Interventions activity participation with tolerable pain  -[facility] Name 04/29/25 0815          Bed Mobility    Comment, (Bed Mobility) up in room independently  -[facility] Name 04/29/25 0815          Sit-Stand Transfer    Sit-Stand Sherman (Transfers) independent  -[facility] Name 04/29/25 0815          Stand-Sit Transfer    Stand-Sit [place] (Transfers) independent  -[facility] Name 04/29/25 0815          Gait/Stairs (Locomotion)    Sherman Level (Gait) supervision  -     Distance in Feet (Gait) 600  -[facility] Name             Wound 04/28/25 1049 Left anterior abdomen Surgical Closed Surgical Incision    Wound -  Properties Group Placement Date: 04/28/25  -CB Placement Time: 1049  -CB Present on Original Admission: N  -CB Side: Left  -CB Orientation: anterior  -CB Location: abdomen  -CB Primary Wound Type: Surgical  -CB Secondary Wound Type - Surgical: Closed Surgi  -CB    Retired Wound - Properties Group Placement Date: 04/28/25  -CB Placement Time: 1049  -CB Present on Original Admission: N  -CB Side: Left  -CB Orientation: anterior  -CB Location: abdomen  -CB    Retired Wound - Properties Group Placement Date: 04/28/25  -CB Placement Time: 1049  -CB Present on Original Admission: N  -CB Side: Left  -CB Orientation: anterior  -CB Location: abdomen  -CB    Retired Wound - Properties Group Date first assessed: 04/28/25  -CB Time first assessed: 1049  -CB Present on Original Admission: N  -CB Side: Left  -CB Location: abdomen  -CB      Row Name             NPWT (Negative Pressure Wound Therapy) 04/28/25 1227 Abdomen    NPWT (Negative Pressure Wound Therapy) - Properties Group Placement Date: 04/28/25  -CB Placement Time: 1227  -CB Location: Abdomen  -CB    Retired NPWT (Negative Pressure Wound Therapy) - Properties Group Placement Date: 04/28/25  -CB Placement Time: 1227  -CB Location: Abdomen  -CB    Retired NPWT (Negative Pressure Wound Therapy) - Properties Group Placement Date: 04/28/25  -CB Placement Time: 1227  -CB Location: Abdomen  -CB    Retired NPWT (Negative Pressure Wound Therapy) - Properties Group Placement Date: 04/28/25  -CB Placement Time: 1227  -CB Location: Abdomen  -CB      Row Name 04/29/25 0815          Positioning and Restraints    Pre-Treatment Position standing in room  -AH     Post Treatment Position other  -AH     Other Position --  standing in room  -               User Key  (r) = Recorded By, (t) = Taken By, (c) = Cosigned By      Initials Name Provider Type    Monica Dickerson PTA Physical Therapist Assistant    Kishore Herrera RN Registered Nurse                      PT Recommendation  and Plan         Outcome Measures       Row Name 04/29/25 0800             How much help from another person do you currently need...    Turning from your back to your side while in flat bed without using bedrails? 4  -AH      Moving from lying on back to sitting on the side of a flat bed without bedrails? 4  -AH      Moving to and from a bed to a chair (including a wheelchair)? 4  -AH      Standing up from a chair using your arms (e.g., wheelchair, bedside chair)? 4  -AH      Climbing 3-5 steps with a railing? 3  -AH      To walk in hospital room? 4  -AH      AM-PAC 6 Clicks Score (PT) 23  -AH         Functional Assessment    Outcome Measure Options AM-PAC 6 Clicks Basic Mobility (PT)  -AH                User Key  (r) = Recorded By, (t) = Taken By, (c) = Cosigned By      Initials Name Provider Type     Monica Lerma PTA Physical Therapist Assistant                     Time Calculation:    PT Charges       Row Name 04/29/25 0848             Time Calculation    Start Time 0815  -      Stop Time 0838  -      Time Calculation (min) 23 min  -AH         Time Calculation- PT    Total Timed Code Minutes- PT 23 minute(s)  -         Timed Charges    71985 - Gait Training Minutes  23  -AH         Total Minutes    Timed Charges Total Minutes 23  -AH       Total Minutes 23  -AH                User Key  (r) = Recorded By, (t) = Taken By, (c) = Cosigned By      Initials Name Provider Type     Monica Lerma PTA Physical Therapist Assistant                  Therapy Charges for Today       Code Description Service Date Service Provider Modifiers Qty    [id number] HC GAIT TRAINING EA 15 MIN 4/29/2025 Monica Lerma PTA GP 2            PT G-Codes  Outcome Measure Options: AM-PAC 6 Clicks Basic Mobility (PT)  AM-PAC 6 Clicks Score (PT): 23  AM-PAC 6 Clicks Score (OT): 22    Monica Lerma PTA  4/29/2025

## 2025-04-29 NOTE — PLAN OF CARE
Goal Outcome Evaluation:              Outcome Evaluation: ALOX4. RA. stand by with LSO brace. on prevena wound vac.

## 2025-04-30 ENCOUNTER — APPOINTMENT (OUTPATIENT)
Dept: GENERAL RADIOLOGY | Facility: HOSPITAL | Age: 60
DRG: 427 | End: 2025-04-30
Payer: MEDICAID

## 2025-04-30 ENCOUNTER — ANESTHESIA (OUTPATIENT)
Dept: PERIOP | Facility: HOSPITAL | Age: 60
DRG: 427 | End: 2025-04-30
Payer: MEDICAID

## 2025-04-30 ENCOUNTER — ANESTHESIA EVENT (OUTPATIENT)
Dept: PERIOP | Facility: HOSPITAL | Age: 60
DRG: 427 | End: 2025-04-30
Payer: MEDICAID

## 2025-04-30 PROCEDURE — 25010000002 FENTANYL CITRATE (PF) 50 MCG/ML SOLUTION: Performed by: ANESTHESIOLOGY

## 2025-04-30 PROCEDURE — 76000 FLUOROSCOPY <1 HR PHYS/QHP: CPT

## 2025-04-30 PROCEDURE — 25010000002 HYDROMORPHONE PER 4 MG: Performed by: ANESTHESIOLOGY

## 2025-04-30 PROCEDURE — 25010000002 CEFAZOLIN PER 500 MG: Performed by: ORTHOPAEDIC SURGERY

## 2025-04-30 PROCEDURE — C1713 ANCHOR/SCREW BN/BN,TIS/BN: HCPCS | Performed by: ORTHOPAEDIC SURGERY

## 2025-04-30 PROCEDURE — 94799 UNLISTED PULMONARY SVC/PX: CPT

## 2025-04-30 PROCEDURE — 25010000002 PROPOFOL 10 MG/ML EMULSION: Performed by: NURSE ANESTHETIST, CERTIFIED REGISTERED

## 2025-04-30 PROCEDURE — C1769 GUIDE WIRE: HCPCS | Performed by: ORTHOPAEDIC SURGERY

## 2025-04-30 PROCEDURE — 0SG00A0 FUSION OF LUMBAR VERTEBRAL JOINT WITH INTERBODY FUSION DEVICE, ANTERIOR APPROACH, ANTERIOR COLUMN, OPEN APPROACH: ICD-10-PCS | Performed by: ORTHOPAEDIC SURGERY

## 2025-04-30 PROCEDURE — 97164 PT RE-EVAL EST PLAN CARE: CPT | Performed by: PHYSICAL THERAPIST

## 2025-04-30 PROCEDURE — 25810000003 LACTATED RINGERS PER 1000 ML: Performed by: ANESTHESIOLOGY

## 2025-04-30 PROCEDURE — 72100 X-RAY EXAM L-S SPINE 2/3 VWS: CPT

## 2025-04-30 PROCEDURE — 25010000002 FENTANYL CITRATE (PF) 100 MCG/2ML SOLUTION: Performed by: NURSE ANESTHETIST, CERTIFIED REGISTERED

## 2025-04-30 PROCEDURE — 97530 THERAPEUTIC ACTIVITIES: CPT | Performed by: PHYSICAL THERAPIST

## 2025-04-30 DEVICE — AMP II PLATE AND CENTER SCREW, 2-HOLE, 04
Type: IMPLANTABLE DEVICE | Site: SPINE LUMBAR | Status: FUNCTIONAL
Brand: AMP II

## 2025-04-30 DEVICE — AVITENE FLOUR, 1.0 GRAM
Type: IMPLANTABLE DEVICE | Site: SPINE LUMBAR | Status: FUNCTIONAL
Brand: AVITENE

## 2025-04-30 DEVICE — IDENTITI II LIF 8 X 22 X 50 MM, 10°
Type: IMPLANTABLE DEVICE | Site: SPINE LUMBAR | Status: FUNCTIONAL
Brand: IDENTITI II

## 2025-04-30 DEVICE — LATERAL BONE SCREW, 5.0 X 45MM
Type: IMPLANTABLE DEVICE | Site: SPINE LUMBAR | Status: FUNCTIONAL
Brand: AMP-LTX

## 2025-04-30 RX ORDER — FLUMAZENIL 0.1 MG/ML
0.2 INJECTION INTRAVENOUS AS NEEDED
Status: DISCONTINUED | OUTPATIENT
Start: 2025-04-30 | End: 2025-04-30 | Stop reason: HOSPADM

## 2025-04-30 RX ORDER — SODIUM CHLORIDE, SODIUM LACTATE, POTASSIUM CHLORIDE, CALCIUM CHLORIDE 600; 310; 30; 20 MG/100ML; MG/100ML; MG/100ML; MG/100ML
100 INJECTION, SOLUTION INTRAVENOUS CONTINUOUS PRN
Status: DISPENSED | OUTPATIENT
Start: 2025-04-30 | End: 2025-05-01

## 2025-04-30 RX ORDER — LABETALOL HYDROCHLORIDE 5 MG/ML
5 INJECTION, SOLUTION INTRAVENOUS
Status: DISCONTINUED | OUTPATIENT
Start: 2025-04-30 | End: 2025-04-30 | Stop reason: HOSPADM

## 2025-04-30 RX ORDER — ONDANSETRON 2 MG/ML
4 INJECTION INTRAMUSCULAR; INTRAVENOUS
Status: DISCONTINUED | OUTPATIENT
Start: 2025-04-30 | End: 2025-04-30 | Stop reason: HOSPADM

## 2025-04-30 RX ORDER — ROCURONIUM BROMIDE 10 MG/ML
INJECTION, SOLUTION INTRAVENOUS AS NEEDED
Status: DISCONTINUED | OUTPATIENT
Start: 2025-04-30 | End: 2025-04-30 | Stop reason: SURG

## 2025-04-30 RX ORDER — IBUPROFEN 600 MG/1
600 TABLET, FILM COATED ORAL EVERY 6 HOURS PRN
Status: DISCONTINUED | OUTPATIENT
Start: 2025-04-30 | End: 2025-04-30 | Stop reason: HOSPADM

## 2025-04-30 RX ORDER — SUCCINYLCHOLINE/SOD CL,ISO/PF 200MG/10ML
SYRINGE (ML) INTRAVENOUS AS NEEDED
Status: DISCONTINUED | OUTPATIENT
Start: 2025-04-30 | End: 2025-04-30 | Stop reason: SURG

## 2025-04-30 RX ORDER — EPHEDRINE SULFATE 50 MG/ML
INJECTION, SOLUTION INTRAVENOUS AS NEEDED
Status: DISCONTINUED | OUTPATIENT
Start: 2025-04-30 | End: 2025-04-30 | Stop reason: SURG

## 2025-04-30 RX ORDER — OXYCODONE AND ACETAMINOPHEN 10; 325 MG/1; MG/1
1 TABLET ORAL EVERY 4 HOURS PRN
Status: DISCONTINUED | OUTPATIENT
Start: 2025-04-30 | End: 2025-04-30 | Stop reason: HOSPADM

## 2025-04-30 RX ORDER — SODIUM CHLORIDE, SODIUM LACTATE, POTASSIUM CHLORIDE, CALCIUM CHLORIDE 600; 310; 30; 20 MG/100ML; MG/100ML; MG/100ML; MG/100ML
20 INJECTION, SOLUTION INTRAVENOUS CONTINUOUS
Status: DISPENSED | OUTPATIENT
Start: 2025-04-30 | End: 2025-05-01

## 2025-04-30 RX ORDER — SODIUM CHLORIDE 0.9 % (FLUSH) 0.9 %
10 SYRINGE (ML) INJECTION EVERY 12 HOURS SCHEDULED
Status: DISCONTINUED | OUTPATIENT
Start: 2025-04-30 | End: 2025-04-30 | Stop reason: HOSPADM

## 2025-04-30 RX ORDER — SODIUM CHLORIDE 9 MG/ML
40 INJECTION, SOLUTION INTRAVENOUS AS NEEDED
Status: DISCONTINUED | OUTPATIENT
Start: 2025-04-30 | End: 2025-04-30 | Stop reason: HOSPADM

## 2025-04-30 RX ORDER — OXYCODONE HCL 20 MG/1
20 TABLET, FILM COATED, EXTENDED RELEASE ORAL ONCE
Status: COMPLETED | OUTPATIENT
Start: 2025-04-30 | End: 2025-04-30

## 2025-04-30 RX ORDER — PROPOFOL 10 MG/ML
VIAL (ML) INTRAVENOUS AS NEEDED
Status: DISCONTINUED | OUTPATIENT
Start: 2025-04-30 | End: 2025-04-30 | Stop reason: SURG

## 2025-04-30 RX ORDER — NALOXONE HCL 0.4 MG/ML
0.04 VIAL (ML) INJECTION AS NEEDED
Status: DISCONTINUED | OUTPATIENT
Start: 2025-04-30 | End: 2025-04-30 | Stop reason: HOSPADM

## 2025-04-30 RX ORDER — FENTANYL CITRATE 50 UG/ML
INJECTION, SOLUTION INTRAMUSCULAR; INTRAVENOUS AS NEEDED
Status: DISCONTINUED | OUTPATIENT
Start: 2025-04-30 | End: 2025-04-30 | Stop reason: SURG

## 2025-04-30 RX ORDER — FENTANYL CITRATE 50 UG/ML
50 INJECTION, SOLUTION INTRAMUSCULAR; INTRAVENOUS
Status: COMPLETED | OUTPATIENT
Start: 2025-04-30 | End: 2025-04-30

## 2025-04-30 RX ORDER — MAGNESIUM HYDROXIDE 1200 MG/15ML
LIQUID ORAL AS NEEDED
Status: DISCONTINUED | OUTPATIENT
Start: 2025-04-30 | End: 2025-04-30 | Stop reason: HOSPADM

## 2025-04-30 RX ORDER — SODIUM CHLORIDE 0.9 % (FLUSH) 0.9 %
10 SYRINGE (ML) INJECTION AS NEEDED
Status: DISCONTINUED | OUTPATIENT
Start: 2025-04-30 | End: 2025-04-30 | Stop reason: HOSPADM

## 2025-04-30 RX ORDER — HYDROMORPHONE HYDROCHLORIDE 1 MG/ML
0.5 INJECTION, SOLUTION INTRAMUSCULAR; INTRAVENOUS; SUBCUTANEOUS
Status: DISCONTINUED | OUTPATIENT
Start: 2025-04-30 | End: 2025-04-30 | Stop reason: HOSPADM

## 2025-04-30 RX ADMIN — POTASSIUM CHLORIDE 40 MEQ: 20 SOLUTION ORAL at 10:00

## 2025-04-30 RX ADMIN — HYDROCHLOROTHIAZIDE 50 MG: 25 TABLET ORAL at 10:02

## 2025-04-30 RX ADMIN — FENTANYL CITRATE 100 MCG: 50 INJECTION, SOLUTION INTRAMUSCULAR; INTRAVENOUS at 07:36

## 2025-04-30 RX ADMIN — TAMSULOSIN HYDROCHLORIDE 0.4 MG: 0.4 CAPSULE ORAL at 21:48

## 2025-04-30 RX ADMIN — Medication 3 ML: at 21:49

## 2025-04-30 RX ADMIN — HYDROMORPHONE HYDROCHLORIDE 0.5 MG: 1 INJECTION, SOLUTION INTRAMUSCULAR; INTRAVENOUS; SUBCUTANEOUS at 08:38

## 2025-04-30 RX ADMIN — MINOCYCLINE HYDROCHLORIDE 100 MG: 50 CAPSULE ORAL at 10:00

## 2025-04-30 RX ADMIN — BUDESONIDE AND FORMOTEROL FUMARATE DIHYDRATE 2 PUFF: 160; 4.5 AEROSOL RESPIRATORY (INHALATION) at 19:03

## 2025-04-30 RX ADMIN — BUDESONIDE AND FORMOTEROL FUMARATE DIHYDRATE 2 PUFF: 160; 4.5 AEROSOL RESPIRATORY (INHALATION) at 05:18

## 2025-04-30 RX ADMIN — MINOCYCLINE HYDROCHLORIDE 100 MG: 50 CAPSULE ORAL at 21:48

## 2025-04-30 RX ADMIN — CEFAZOLIN 2000 MG: 2 INJECTION, POWDER, FOR SOLUTION INTRAMUSCULAR; INTRAVENOUS at 07:12

## 2025-04-30 RX ADMIN — OXYCODONE AND ACETAMINOPHEN 1 TABLET: 325; 10 TABLET ORAL at 09:03

## 2025-04-30 RX ADMIN — MINOXIDIL 5 MG: 2.5 TABLET ORAL at 10:00

## 2025-04-30 RX ADMIN — SODIUM CHLORIDE, POTASSIUM CHLORIDE, SODIUM LACTATE AND CALCIUM CHLORIDE 20 ML/HR: 600; 310; 30; 20 INJECTION, SOLUTION INTRAVENOUS at 06:31

## 2025-04-30 RX ADMIN — CITALOPRAM HYDROBROMIDE 40 MG: 20 TABLET ORAL at 10:02

## 2025-04-30 RX ADMIN — SENNOSIDES, DOCUSATE SODIUM 2 TABLET: 50; 8.6 TABLET, FILM COATED ORAL at 21:48

## 2025-04-30 RX ADMIN — Medication 140 MG: at 07:06

## 2025-04-30 RX ADMIN — EPHEDRINE SULFATE 5 MG: 50 INJECTION, SOLUTION INTRAVENOUS at 07:19

## 2025-04-30 RX ADMIN — OXYCODONE HYDROCHLORIDE 20 MG: 20 TABLET, FILM COATED, EXTENDED RELEASE ORAL at 06:45

## 2025-04-30 RX ADMIN — FENTANYL CITRATE 50 MCG: 50 INJECTION, SOLUTION INTRAMUSCULAR; INTRAVENOUS at 08:50

## 2025-04-30 RX ADMIN — CEFAZOLIN 2000 MG: 2 INJECTION, POWDER, FOR SOLUTION INTRAMUSCULAR; INTRAVENOUS at 15:02

## 2025-04-30 RX ADMIN — Medication 3 ML: at 09:00

## 2025-04-30 RX ADMIN — OXYCODONE AND ACETAMINOPHEN 1 TABLET: 325; 10 TABLET ORAL at 13:19

## 2025-04-30 RX ADMIN — Medication 400 MG: at 10:02

## 2025-04-30 RX ADMIN — PANTOPRAZOLE SODIUM 40 MG: 40 TABLET, DELAYED RELEASE ORAL at 10:00

## 2025-04-30 RX ADMIN — FENTANYL CITRATE 100 MCG: 50 INJECTION, SOLUTION INTRAMUSCULAR; INTRAVENOUS at 07:02

## 2025-04-30 RX ADMIN — PROPOFOL 150 MG: 10 INJECTION, EMULSION INTRAVENOUS at 07:06

## 2025-04-30 RX ADMIN — HYDROMORPHONE HYDROCHLORIDE 0.5 MG: 1 INJECTION, SOLUTION INTRAMUSCULAR; INTRAVENOUS; SUBCUTANEOUS at 08:49

## 2025-04-30 RX ADMIN — OXYCODONE HYDROCHLORIDE AND ACETAMINOPHEN 1 TABLET: 7.5; 325 TABLET ORAL at 18:12

## 2025-04-30 RX ADMIN — OXYCODONE AND ACETAMINOPHEN 1 TABLET: 325; 10 TABLET ORAL at 03:31

## 2025-04-30 RX ADMIN — SENNOSIDES, DOCUSATE SODIUM 2 TABLET: 50; 8.6 TABLET, FILM COATED ORAL at 10:01

## 2025-04-30 RX ADMIN — OXYCODONE AND ACETAMINOPHEN 1 TABLET: 325; 10 TABLET ORAL at 21:56

## 2025-04-30 RX ADMIN — FLUTICASONE PROPIONATE 2 SPRAY: 50 SPRAY, METERED NASAL at 10:03

## 2025-04-30 RX ADMIN — FENTANYL CITRATE 50 MCG: 50 INJECTION, SOLUTION INTRAMUSCULAR; INTRAVENOUS at 08:39

## 2025-04-30 RX ADMIN — ROCURONIUM BROMIDE 10 MG: 10 INJECTION, SOLUTION INTRAVENOUS at 07:05

## 2025-04-30 RX ADMIN — TAMSULOSIN HYDROCHLORIDE 0.4 MG: 0.4 CAPSULE ORAL at 10:01

## 2025-04-30 NOTE — THERAPY RE-EVALUATION
Patient Name: Kurtis Valladares  : 1965    MRN: [id number]                              Today's Date: 2025       Admit Date: 2025    Visit Dx:     ICD-10-CM ICD-9-CM   1. Impaired mobility [Z74.09]  Z74.09 799.89     Patient Active Problem List   Diagnosis    Right knee pain    Primary osteoarthritis of right knee    Effusion of right knee    Pain of right heel    Postcalcaneal bursitis of right foot    Pre-op evaluation    Abnormal nuclear stress test    Preop cardiovascular exam    Lumbar radiculopathy    Hypokalemia    Foraminal stenosis of lumbar region, severe    Bradycardia    Lumbosacral disc disease    Lumbar spondylosis    Lumbosacral radiculopathy    BPH with obstruction/lower urinary tract symptoms    MARTI (generalized anxiety disorder)    Drug-induced constipation     Past Medical History:   Diagnosis Date    Acute bronchitis     not since     Arthritis     Asthma     Dyspnea     Enlarged prostate     GERD (gastroesophageal reflux disease)     Hypertension     Pain in lower limb     Sleep apnea     history of, pt states he thinks it has resolved    Sprain of ankle     Wound dehiscence, surgical 2024    lumbar wound     Past Surgical History:   Procedure Laterality Date    ANTERIOR LUMBAR EXPOSURE N/A 2025    Procedure: ANTERIOR LUMBAR EXPOSURE;  Surgeon: Montana Rangel DO;  Location:  [facility] OR;  Service: Vascular;  Laterality: N/A;    CARDIAC CATHETERIZATION N/A 2020    Procedure: Left Heart Cath/PCI if indicated;  Surgeon: Hawa Dominguez MD;  Location:  [facility];  Service: Cardiology    GASTRIC SLEEVE LAPAROSCOPIC      INCISION AND DRAINAGE POSTERIOR SPINE N/A 2023    Procedure: IRRIGATION AND DEBRIDEMENT POSTERIOR LUMBAR WOUND;  Surgeon: PAWEL Iyer MD;  Location:  [facility];  Service: Orthopedic Spine;  Laterality: N/A;    INCISION AND DRAINAGE POSTERIOR SPINE N/A 2024    Procedure: IRRIGATION AND DEBRIDEMENT  LUMBAR WOUND DEHISCENCE;  Surgeon: PAWEL Iyer MD;  Location:  [facility];  Service: Orthopedic Spine;  Laterality: N/A;    LUMBAR FUSION N/A 4/28/2025    Procedure: AMTERIOR DECOMPRESSION, ANTERIOR LUMBAR INTERBODY FUSION WITH INSTRUMENTATION L5-S1;  Surgeon: PAWEL Iyer MD;  Location:  [facility];  Service: Orthopedic Spine;  Laterality: N/A;    LUMBAR LAMINECTOMY WITH FUSION N/A 11/13/2023    Procedure: HEMILAMINECTOMY, DECOMPRESSION, PARTIAL FACETECTOMY, FORAMINOTOMY BILATERAL L3-4, LEFT L5-S1;  Surgeon: PAWEL Iyer MD;  Location:  [facility] OR;  Service: Orthopedic Spine;  Laterality: N/A;      General Information       Row Name 04/30/25 [facility]0          Physical Therapy Time and Intention    Document Type re-evaluation  s/p L LLIF L3-4, back pain L>R, L buttock, thigh, and leg radiculopathy, R lateral anterior thigh radicular pain, neurogenic claudication, s/p ALIF L3-4  -MS     Mode of Treatment physical therapy;individual therapy  -MS       Row Name 04/30/25 1430          General Information    Patient Profile Reviewed yes  -MS     Prior Level of Function independent:;all household mobility;ADL's  -MS     Existing Precautions/Restrictions fall;LSO;spinal;brace worn when out of bed  -MS     Barriers to Rehab none identified  -MS       Row Name 04/30/25 1430          Living Environment    Current Living Arrangements home  -MS     People in Home spouse  -MS       Row Name 04/30/25 1430          Home Main Entrance    Number of Stairs, Main Entrance two  -MS     Stair Railings, Main Entrance none  -MS       Row Name 04/30/25 1430          Stairs Within Home, Primary    Number of Stairs, Within Home, Primary none  -MS       Row Name 04/30/25 1430          Cognition    Orientation Status (Cognition) oriented x 4  -MS               User Key  (r) = Recorded By, (t) = Taken By, (c) = Cosigned By      Initials Name Provider Type    Ramona Leyva, PT, DPT, NCS Physical Therapist                    Mobility       Row Name 04/30/25 1430          Bed Mobility    Bed Mobility sidelying-sit  -MS     Sidelying-Sit [place] (Bed Mobility) supervision;verbal cues  -MS     Assistive Device (Bed Mobility) bed rails  -MS       Row Name 04/30/25 1430          Sit-Stand Transfer    Sit-Stand [place] (Transfers) supervision  -MS       Row Name 04/30/25 1430          Gait/Stairs (Locomotion)    [place] Level (Gait) supervision  -MS     Patient was able to Ambulate yes  -MS     Distance in Feet (Gait) 600  -MS               User Key  (r) = Recorded By, (t) = Taken By, (c) = Cosigned By      Initials Name Provider Type    Ramona Leyva, PT, DPT, NCS Physical Therapist                   Obj/Interventions       Row Name 04/30/25 1430          Range of Motion Comprehensive    General Range of Motion bilateral upper extremity ROM WFL;bilateral lower extremity ROM WFL  -MS       Row Name 04/30/25 1430          Strength Comprehensive (MMT)    Comment, General Manual Muscle Testing (MMT) Assessment B LE 5/5  -MS       Row Name 04/30/25 1430          Balance    Balance Assessment sitting static balance;sitting dynamic balance;standing static balance;standing dynamic balance  -MS     Static Sitting Balance independent  -MS     Dynamic Sitting Balance independent  -MS     Position, Sitting Balance unsupported;sitting edge of bed  -MS     Static Standing Balance contact guard  -MS     Dynamic Standing Balance contact guard  -MS     Position/Device Used, Standing Balance unsupported  -MS       [facility] Name 04/30/25 1430          Sensory Assessment (Somatosensory)    Sensory Assessment (Somatosensory) sensation intact  -MS               User Key  (r) = Recorded By, (t) = Taken By, (c) = Cosigned By      Initials Name Provider Type    Ramona Leyva, PT, DPT, NCS Physical Therapist                   Goals/Plan       Row Name 04/30/25 1430          Bed Mobility Goal 1 (PT)    Activity/Assistive Device (Bed Mobility Goal  1, PT) bed mobility activities, all  -MS     [place] Level/Cues Needed (Bed Mobility Goal 1, PT) independent  -MS     Time Frame (Bed Mobility Goal 1, PT) long term goal (LTG);by discharge  -MS     Progress/Outcomes (Bed Mobility Goal 1, PT) good progress toward goal;goal ongoing  -MS       Row Name 04/30/25 1430          Transfer Goal 1 (PT)    Activity/Assistive Device (Transfer Goal 1, PT) sit-to-stand/stand-to-sit;bed-to-chair/chair-to-bed  -MS     [place] Level/Cues Needed (Transfer Goal 1, PT) independent  -MS     Time Frame (Transfer Goal 1, PT) long term goal (LTG);by discharge  -MS     Progress/Outcome (Transfer Goal 1, PT) good progress toward goal;goal ongoing  -MS       Row Name 04/30/25 1430          Gait Training Goal 1 (PT)    Activity/Assistive Device (Gait Training Goal 1, PT) gait (walking locomotion);decrease fall risk;improve balance and speed;increase endurance/gait distance  -MS     [place] Level (Gait Training Goal 1, PT) independent  -MS     Distance (Gait Training Goal 1, PT) 1000ft  -MS     Time Frame (Gait Training Goal 1, PT) long term goal (LTG);by discharge  -MS     Progress/Outcome (Gait Training Goal 1, PT) good progress toward goal;goal ongoing  -MS       Row Name 04/30/25 1430          Problem Specific Goal 1 (PT)    Problem Specific Goal 1 (PT) The patient will independently implement 1 pain management technique to decrease pain in order to improve functional mobility.  -MS     Time Frame (Problem Specific Goal 1, PT) long-term goal (LTG);by discharge  -MS     Progress/Outcome (Problem Specific Goal 1, PT) good progress toward goal;goal ongoing  -MS       Row Name 04/30/25 1430          Therapy Assessment/Plan (PT)    Planned Therapy Interventions (PT) balance training;bed mobility training;gait training;patient/family education;transfer training;strengthening;orthotic fitting/training  -MS               User Key  (r) = Recorded By, (t) = Taken By, (c) = Cosigned By       Initials Name Provider Type    Ramona Leyva KATE, PT, DPT, NCS Physical Therapist                   Clinical Impression       Row Name 04/30/25 1430          Pain    Pretreatment Pain Rating 2/10  -MS     Posttreatment Pain Rating 2/10  -MS     Pain Location abdomen;back;flank  incisional  -MS     Pain Management Interventions activity modification encouraged;exercise or physical activity utilized  -MS     Response to Pain Interventions no change per patient report  -MS       Row Name 04/30/25 1430          Plan of Care Review    Plan of Care Reviewed With patient  -MS     Progress no change  -MS     Outcome Evaluation The patient presents alert and oriented x4 lying in bed with LSO in the room. The patient remembers 0/3 spinal restrictions, but remembers when to wear his LSO. He was able to perform his bed mobility abiding by his spinal restrictions. He demonstrates no focal neurological deficits in strength, sensation, or coordination. He was able to ambulate in the hallway safely and clear his L foot safely. PT will continue to work with him to encourage continued activity between surgeries and education on spinal restrictions.  -MS       Row Name 04/30/25 1430          Therapy Assessment/Plan (PT)    Patient/Family Therapy Goals Statement (PT) go home  -MS     Rehab Potential (PT) good  -MS     Criteria for Skilled Interventions Met (PT) yes;meets criteria;skilled treatment is necessary  -MS     Therapy Frequency (PT) 2 times/day  -MS     Predicted Duration of Therapy Intervention (PT) until discharge  -MS       Row Name 04/30/25 1430          Positioning and Restraints    Post Treatment Position bathroom  -MS     Bathroom sitting;call light within reach;encouraged to call for assist;with nsg;with brace  -MS               User Key  (r) = Recorded By, (t) = Taken By, (c) = Cosigned By      Initials Name Provider Type    Ramona Leyva R, PT, DPT, NCS Physical Therapist                   Outcome Measures        Row Name 04/30/25 1430 04/30/25 1000       How much help from another person do you currently need...    Turning from your back to your side while in flat bed without using bedrails? 4  -MS 4  -SS    Moving from lying on back to sitting on the side of a flat bed without bedrails? 4  -MS 4  -SS    Moving to and from a bed to a chair (including a wheelchair)? 4  -MS 4  -SS    Standing up from a chair using your arms (e.g., wheelchair, bedside chair)? 4  -MS 4  -SS    Climbing 3-5 steps with a railing? 3  -MS 3  -SS    To walk in hospital room? 4  -MS 4  -SS    AM-PAC 6 Clicks Score (PT) 23  -MS 23  -SS    Highest Level of Mobility Goal 7 --> Walk 25 feet or more  -MS 7 --> Walk 25 feet or more  -SS      Row Name 04/30/25 1430          Functional Assessment    Outcome Measure Options AM-PAC 6 Clicks Basic Mobility (PT)  -MS               User Key  (r) = Recorded By, (t) = Taken By, (c) = Cosigned By      Initials Name Provider Type    Ramona Leyva R, PT, DPT, NCS Physical Therapist    SS Pauline Herr, RN Registered Nurse                                 Physical Therapy Education       Title: PT OT SLP Therapies (In Progress)       Topic: Physical Therapy (In Progress)       Point: Mobility training (In Progress)       Learning Progress Summary            Patient Acceptance, E,TB, NR by MS at 4/30/2025 1526    Comment: role of PT in his care, spinal restrictions, use of LSO                      Point: Home exercise program (Not Started)       Learner Progress:  Not documented in this visit.              Point: Body mechanics (Not Started)       Learner Progress:  Not documented in this visit.              Point: Precautions (In Progress)       Learning Progress Summary            Patient Acceptance, E,TB, NR by MS at 4/30/2025 1526    Comment: role of PT in his care, spinal restrictions, use of LSO                                      User Key       Initials Effective Dates Name Provider Type Discipline     MS 07/11/23 -  Ramona Ross PT, DPT, DIOGENES Physical Therapist PT                  PT Recommendation and Plan  Planned Therapy Interventions (PT): balance training, bed mobility training, gait training, patient/family education, transfer training, strengthening, orthotic fitting/training  Progress: no change  Outcome Evaluation: The patient presents alert and oriented x4 lying in bed with LSO in the room. The patient remembers 0/3 spinal restrictions, but remembers when to wear his LSO. He was able to perform his bed mobility abiding by his spinal restrictions. He demonstrates no focal neurological deficits in strength, sensation, or coordination. He was able to ambulate in the hallway safely and clear his L foot safely. PT will continue to work with him to encourage continued activity between surgeries and education on spinal restrictions.     Time Calculation:         PT Charges       Row Name 04/30/25 1430             Time Calculation    Start Time 1430  -MS      Stop Time 1510  -MS      Time Calculation (min) 40 min  -MS      PT Received On 04/30/25  -MS      PT Goal Re-Cert Due Date 06/10/25  -MS         Time Calculation- PT    Total Timed Code Minutes- PT 10 minute(s)  -MS         Timed Charges    30581 - PT Therapeutic Activity Minutes 10  -MS         Untimed Charges    PT Eval/Re-eval Minutes 30  -MS         Total Minutes    Timed Charges Total Minutes 10  -MS      Untimed Charges Total Minutes 30  -MS       Total Minutes 40  -MS                User Key  (r) = Recorded By, (t) = Taken By, (c) = Cosigned By      Initials Name Provider Type    MS Ramona Ross PT, DPT, NCS Physical Therapist                      PT G-Codes  Outcome Measure Options: AM-PAC 6 Clicks Basic Mobility (PT)  AM-PAC 6 Clicks Score (PT): 23  AM-PAC 6 Clicks Score (OT): 22  PT Discharge Summary  Anticipated Discharge Disposition (PT): home with assist    Ramona Ross, PT, DPT, DIOGENES  4/30/2025

## 2025-04-30 NOTE — PLAN OF CARE
Goal Outcome Evaluation:           Progress: improving     Pt A/Ox4. VSS on Ra. IV to bilateral hands, IID. Prevena wound vac to low abd and L flank dressing CDI. Up ad dinesh with LSO brace. C/o pain, PRN meds given. Voiding. Plan for part 3 on 5/2. Safety maintained. Call light in reach.

## 2025-04-30 NOTE — PAYOR COMM NOTE
"Viviane Valladares (59 y.o. Male)   [id number]   Admit  4/28     [facility]  phone   fax       clinical for 4/30    op note 4/30        Date of Birth   1965    Social Security Number       Address   [address] [place] [id number]    Home Phone   [phone]    MRN   [id number]       [place]   [employer]    Marital Status                               Admission Date   4/28/2025    Admission Type   Elective    Admitting Provider   PAWEL Iyer MD    Attending Provider   PAWEL Iyer MD    Department, Room/Bed   [facility] 3A, 343/1       Discharge Date       Discharge Disposition       Discharge Destination                                 Attending Provider: PAWEL Iyer MD    Allergies: No Known Allergies    Isolation: None   Infection: None   Code Status: CPR    Ht: 185 cm (72.84\")   Wt: 110 kg (242 lb 4.6 oz)    Admission Cmt: None   Principal Problem: Lumbosacral radiculopathy [M54.17]                   Active Insurance as of 4/28/2025       Primary Coverage       Payor Plan Insurance Group Employer/Plan Group    [facility] COMMUNITY PLAN [facility] COMMUNITY PLAN [facility]       Payor Plan Address Payor Plan Phone Number Payor Plan Fax Number Effective Dates    [address]   1/1/2025 - None Entered    [facility] [id number]         Subscriber Name Subscriber Birth Date Member ID       VIVIANE VALLADARES 1965 [id number]                     Emergency Contacts        (Rel.) Home Phone Work Phone Mobile Phone    Candi Valladares (Spouse) -- -- [phone]    Kourtney Valladares (Daughter) -- -- [phone]                 History & Physical        PAWEL Iyer MD at 04/30/25 0638          The office history and physical exam was again reviewed.  There are no changes.  Proceed with the second lateral stage of the procedure today.    Electronically signed by PAWEL Iyer MD at " 04/30/25 0657       PAWEL Iyer MD at 04/28/25 0706          The office history and physical exam was reviewed.  There are no changes.  Proceed with surgery as planned.    Electronically signed by PAWEL Iyer MD at 04/28/25 0707       H&P signed by [employer], [employer] at 04/24/25 0803         [Media Unavailable] Scan on 4/24/2025 0857 by [employer], [employer]: RECHECK OF CERVICAL SPINE, [facility], 03/28/2025          Electronically signed by [employer], [employer] at 04/24/25 0803       Current Facility-Administered Medications   Medication Dose Route Frequency Provider Last Rate Last Admin    acetaminophen (TYLENOL) tablet 650 mg  650 mg Oral Q4H PRN PAWEL Iyer MD        Or    acetaminophen (TYLENOL) 160 MG/5ML oral solution 650 mg  650 mg Oral Q4H PRN PAWEL Iyer MD        Or    acetaminophen (TYLENOL) suppository 650 mg  650 mg Rectal Q4H PRN PAWEL Iyer MD        albuterol sulfate HFA (PROVENTIL HFA;VENTOLIN HFA;PROAIR HFA) inhaler 2 puff  2 puff Inhalation PRN PAWEL Iyer MD        sennosides-docusate (PERICOLACE) 8.6-50 MG per tablet 2 tablet  2 tablet Oral BID PAWEL Iyer MD   2 tablet at 04/30/25 1001    And    polyethylene glycol (MIRALAX) packet 17 g  17 g Oral Daily PRN PAWEL Iyer MD        And    bisacodyl (DULCOLAX) EC tablet 5 mg  5 mg Oral Daily PRN PAWEL Iyer MD        And    bisacodyl (DULCOLAX) suppository 10 mg  10 mg Rectal Daily PRN PAWEL Iyer MD        budesonide-formoterol (SYMBICORT) 160-4.5 MCG/ACT inhaler 2 puff  2 puff Inhalation BID - RT PAWEL Iyer MD   2 puff at 04/30/25 0518    ceFAZolin 2000 mg IVPB in 100 mL NS (MBP)  2,000 mg Intravenous Once PAWEL Iyer MD        citalopram (CeleXA) tablet 40 mg  40 mg Oral Daily PAWEL Iyer MD   40 mg at 04/30/25 1002    fluticasone (FLONASE) 50 MCG/ACT nasal spray 2 spray  2 spray Nasal Daily PAWEL Iyer MD   2 spray at  04/30/25 1003    hydroCHLOROthiazide tablet 50 mg  50 mg Oral Daily PAWEL Iyer MD   50 mg at 04/30/25 1002    HYDROmorphone (DILAUDID) injection 0.5 mg  0.5 mg Intravenous Q3H PRN PAWEL Iyer MD        And    naloxone (NARCAN) injection 0.4 mg  0.4 mg Intravenous Q5 Min PRN PAWEL Iyer MD        lactated ringers infusion  100 mL/hr Intravenous Continuous PRN PAWEL Iyer MD        lactated ringers infusion  20 mL/hr Intravenous Continuous PAWEL Iyer MD 20 mL/hr at 04/30/25 0631 20 mL/hr at 04/30/25 0631    magnesium oxide (MAG-OX) tablet 400 mg  400 mg Oral Daily PAWEL Iyer MD   400 mg at 04/30/25 1002    methocarbamol (ROBAXIN) tablet 1,000 mg  1,000 mg Oral 4x Daily PRN PAWEL Iyer MD        minocycline (MINOCIN,DYNACIN) capsule 100 mg  100 mg Oral BID PAWEL Iyer MD   100 mg at 04/30/25 1000    minoxidil (LONITEN) tablet 5 mg  5 mg Oral Daily PAWEL Iyer MD   5 mg at 04/30/25 1000    ondansetron ODT (ZOFRAN-ODT) disintegrating tablet 4 mg  4 mg Oral Q6H PRN PAWEL Iyer MD        Or    ondansetron (ZOFRAN) injection 4 mg  4 mg Intravenous Q6H PRN PAWEL Iyer MD        oxyCODONE-acetaminophen (PERCOCET)  MG per tablet 1 tablet  1 tablet Oral Q4H PRN PAWEL Iyer MD   1 tablet at 04/30/25 0331    oxyCODONE-acetaminophen (PERCOCET) 7.5-325 MG per tablet 1 tablet  1 tablet Oral Q4H PRN PAWEL Iyer MD        pantoprazole (PROTONIX) EC tablet 40 mg  40 mg Oral Q AM PAWEL Iyer MD   40 mg at 04/30/25 1000    potassium chloride (KAYCIEL) 20 mEq/15 mL solution 40 mEq  40 mEq Oral Daily PAWEL Iyer MD   40 mEq at 04/30/25 1000    sodium chloride 0.9 % flush 10 mL  10 mL Intravenous PRN PAWEL Iyer MD        sodium chloride 0.9 % flush 3 mL  3 mL Intravenous Q12H PAWEL Iyer MD   3 mL at 04/30/25 0900    sodium chloride 0.9 % infusion 40 mL  40 mL Intravenous PRN PAWEL Iyer,  MD        tamsulosin (FLOMAX) 24 hr capsule 0.4 mg  0.4 mg Oral BID PAWEL Iyer MD   0.4 mg at 04/30/25 1001        Operative/Procedure Notes (last 24 hours)        PAWEL Iyer MD at 04/30/25 0635          LUMBAR LATERAL INTERBODY FUSION  Procedure Note    Kurtis Valladares  4/30/2025    Pre-op Diagnosis:     1. Status post hemilaminectomy, decompression, partial facetectomy decompression, bilateral L3-4, left L5-S1, 11/13/2023    2. Status post irrigation and debridement posterior lumbar wound, 12/13/2023    3. Status post revision irrigation and debridement posterior lumbar wound, 02/16/2024   4.  Residual chronic low back pain, left worse than right  5.  Residual left buttock, thigh, and leg radiculopathy  6.  Residual right lateral and anterior thigh radiculopathy  7.  Residual neurogenic claudication  8.  Degenerative disc disease L3-S1  9.  Facet arthropathy L2-S1  10.  Degenerative scoliosis, concave right T12-L4, concave left L5-S1  11.  Congenital short pedicle syndrome  12.  Probable autofusion L4-5  13.  Severe foraminal stenosis right L3-4, left L5-S1  14.  Status post anterior decompression, ALIF with instrumentation L5-S1, 4/28/2025    Post-op Diagnosis:    same    Procedure/CPT® Codes:    1.  Left lateral lumbar interbody fusion L3-4  2.  Anterior spinal instrumentation L3-4 (ATEC ATEC lateral plate and screws)  3.  Use of titanium interbody biomechanical device for fusion L3-4 (ATEC titanium spacer)  4.  Use of allograft bone matrix for fusion L3-4 (Induce NMP Fibers)  5.  Use of fluoroscopy for confirmation of surgical level, placement of titanium spacer and instrumentation  6.  Intraoperative neural monitoring    Spinal Surgery Levels Completed:1 Level     Anesthesia: General    Surgeon: FILIBERTO Iyer MD    Assistant: Huang Avila PA-C    Estimated Blood Loss: Minimal    Complications: None    Condition: Stable to PACU.    Indications:    The patient is a 59-year-old who  seemia Worthington NP for medical issues.  He originally presented to the office with chronic low back pain along with mainly left leg radiculopathy and symptoms consistent with neurogenic claudication.  While we briefly discussed a decompression and fusion procedure, we elected to proceed with a smaller decompression procedure alone at L3-4 and L5-S1.  This procedure was performed on 11/13/2023.  Unfortunately, the surgery was complicated by a draining infected wound that required two irrigation and debridement procedures.  He presented back to the office with residual chronic low back pain as well as residual leg radiculopathy and symptoms consistent with neurogenic claudication.  Repeat imaging studies revealed degenerative disc disease from L3-S1 with facet arthropathy from L2-S1 and congenital short pedicles syndrome.  He was also noted to have a degenerative scoliosis concave to the right from T12-L4 and concave to the left at L5-S1.  He was likely to have a autofusion at L4-5.  These findings created severe foraminal stenosis on the right side of L3-4 on the left side of L5-S1.     After failing conservative measures, it was mutually decided that surgery would be the best option. Risks, benefits, and complications of surgery were discussed with the patient. The patient appeared well informed and wished to proceed. We specifically discussed the risk of infection, blood loss, nerve root injury, CSF leak, and the possibility of incomplete resolution of symptoms. We also discussed the possible risk of a nonunion and the potential need for additional surgery in the event of a pseudoarthrosis or hardware failure.     We elected proceed with a staged operation.  The first stage of the procedure was performed on 4/28/2025 and involved an anterior decompression and ALIF with instrumentation of L5-S1.  Today we return to surgery for the second lateral stage of the procedure to address the L3-4 level.  It is planned we  will be proceeding with a final posterior procedure in a few days.    Operative Procedure:    After obtaining informed consent and verifying the correct operative site, the patient was brought to the operating room and placed supine on operating table.  A general anesthetic was provided by the anesthesia service with the assistance of an endotracheal tube.  Once this was appropriately positioned and secured, the patient was carefully rotated into the lateral decubitus position with the left side up. All bony prominences were well-padded.  3 inch wide cloth tape was used to maintain the patient's position.  It was also used to tip open the pelvis slightly allowing better access to the lumbar spine through a lateral approach.  Fluoroscopy was then used to identify the L3-4 level, and the skin was marked for our planned incision.  The left flank was then prepped and draped in the usual sterile fashion.  A surgical timeout was taken to confirm this was the correct patient, we were working at the correct level, and that preoperative antibiotics were given in a timely fashion.    An oblique incision was created of the left flank using a 10 blade scalpel directly centered over the L3-4 segment. Dissection was carried bluntly through subcutaneous tissues. Blunt dissection was also carried between the external oblique and internal oblique musculature. The transversalis fascia was pierced allowing access to the retroperitoneal space. At this point a series of neuro monitoring probes were used to safely access the L3-4 level. We used stimulated and free run EMG for this purpose. Once nerve roots were confirmed to be out of harm's way, self retaining retractors were placed for continuous exposure.     An annulotomy was then created at the L3-4 area using a 15 blade scalpel on a long handle. A Law elevator was used to remove disc material off of the endplates. Disc material was removed using Kerrisons, pituitaries, and forward  angled curettes. Once all disc material had been retrieved, I used the Law elevator to divide the contralateral annulus. This assisted with mobilization of the vertebral body. We then used a series of endplate scrapers to prepare the endplates for interbody fusion. Trial spacers were malleted into position and for the disc space it was felt that a 12 mm x 60 mm implant from [facility] would be the best fit to restore disc height. The disc space was then thoroughly irrigated with saline solution.     A titanium spacer from the [facility] instrumentation set measuring 12 mm x 60 mm x 22 mm was then packed with allograft bone matrix called Induce NMP Fibers as tightly as possible. This titanium spacer was then malleted into the L3-4 disc space under fluoroscopic guidance. It was placed as a titanium interbody biomechanical device to assist with interbody fusion. Once this spacer was confirmed to be properly positioned, I chose a 2-hole plate to help augment the fusion of L3-4. This plate was held into position using screws. I placed a 45 mm screw into L3 and I placed a shorter 35 mm screw into L4. The shorter screw was used to hopefully accommodate the placement of a pedicle screw on the contralateral L4 pedicle as part of the next stage of the procedure.     The wound was then irrigated thoroughly. A thorough inspection was then undertaken to ensure that we had adequate hemostasis. Bleeding at this point was controlled using Avitene and bipolar cautery. Closure was then accomplished with a #1 Vicryl reapproximating the internal and external oblique musculature. Immediate subcutaneous cutaneous tissues were closed with a 3-0 Vicryl. And skin closure was accomplished using Mastisol and Steri-Strips. The wound was then sterilely dressed. The patient was then carefully [facility]ine [facility] [place] [place], extubated, and sent to the recovery room in good stable condition.     The patient tolerated the procedure well. There were no  complications. We estimate blood loss to be minimal. The patient remained hemodynamically stable.     Intraoperative neuro monitoring was ordered and carried out throughout the procedure to add an increased level of safety for the patient.  The interpreting physician was available by means of real-time continuous, bidirectional, remote audio and visual communication as needed throughout the entire procedure.  Modalities used during the procedure included SSEP, EEG, MEP, EMG, and TOF.  There were no neuro monitoring signal changes during the procedure.    Huang Avila PA-C provided critical assistance during the procedure. His assistance was medically necessary in order to allow the procedure to occur in the most safe and efficient manner.    FILIBERTO Iyer MD     Date: 4/30/2025  Time: 08:29 CDT    Electronically signed by PAWEL Iyer MD at 04/30/25 0829          Maged Quigley MD   Physician  Medicine     Progress Notes     Signed     Date of Service: 04/30/25 0722  Creation Time: 04/30/25 0722     Signed       Expand All Collapse All[]Expand All by Default    Kurtis Valladares is a 59 y.o. male patient.        Postoperative day #2 from first step with planned second lateral stage of procedure today.     N.p.o. for surgical intervention-medically stable.     Wound looks good.  Current Medications             Current Facility-Administered Medications   Medication Dose Route Frequency Provider Last Rate Last Admin    [Transfer Hold] acetaminophen (TYLENOL) tablet 650 mg  650 mg Oral Q4H PRN PAWEL Iyer MD         Or    [Transfer Hold] acetaminophen (TYLENOL) 160 MG/5ML oral solution 650 mg  650 mg Oral Q4H PRN PAWEL Iyer MD         Or    [Transfer Hold] acetaminophen (TYLENOL) suppository 650 mg  650 mg Rectal Q4H PRN PAWEL Iyer MD        [Transfer Hold] albuterol sulfate HFA (PROVENTIL HFA;VENTOLIN HFA;PROAIR HFA) inhaler 2 puff  2 puff Inhalation PRPAWEL Barrett  MD Segun        [Transfer Hold] sennosides-docusate (PERICOLACE) 8.6-50 MG per tablet 2 tablet  2 tablet Oral BID PAWEL Iyer MD   2 tablet at 04/29/25 1957     And    [Transfer Hold] polyethylene glycol (MIRALAX) packet 17 g  17 g Oral Daily PRN PAWEL Iyer MD         And    [Transfer Hold] bisacodyl (DULCOLAX) EC tablet 5 mg  5 mg Oral Daily PRN PAWEL Iyer MD         And    [Transfer Hold] bisacodyl (DULCOLAX) suppository 10 mg  10 mg Rectal Daily PRN PAWEL Iyer MD        [Transfer Hold] budesonide-formoterol (SYMBICORT) 160-4.5 MCG/ACT inhaler 2 puff  2 puff Inhalation BID - RT PAWEL Iyer MD   2 puff at 04/30/25 0518    ceFAZolin 2000 mg IVPB in 100 mL NS (MBP)  2,000 mg Intravenous On Call to OR PAWEL Iyer MD        [Transfer Hold] citalopram (CeleXA) tablet 40 mg  40 mg Oral Daily PAWEL Iyer MD   40 mg at 04/29/25 0838    [Transfer Hold] fluticasone (FLONASE) 50 MCG/ACT nasal spray 2 spray  2 spray Nasal Daily PAWEL Iyer MD   2 spray at 04/29/25 1014    [Transfer Hold] hydroCHLOROthiazide tablet 50 mg  50 mg Oral Daily PAWEL Iyer MD   50 mg at 04/29/25 0838    [Transfer Hold] HYDROmorphone (DILAUDID) injection 0.5 mg  0.5 mg Intravenous Q3H PRN PAWEL Iyer MD         And    [Transfer Hold] naloxone (NARCAN) injection 0.4 mg  0.4 mg Intravenous Q5 Min PRN PAWEL Iyer MD        lactated ringers infusion  100 mL/hr Intravenous Continuous PRN PAWEL Iyer MD        lactated ringers infusion  20 mL/hr Intravenous Continuous DorityShahram MD 20 mL/hr at 04/30/25 0631 20 mL/hr at 04/30/25 0631    [Transfer Hold] magnesium oxide (MAG-OX) tablet 400 mg  400 mg Oral Daily PAWEL Iyer MD   400 mg at 04/29/25 0838    [Transfer Hold] methocarbamol (ROBAXIN) tablet 1,000 mg  1,000 mg Oral 4x Daily PRN PAWEL Iyer MD        minocycline (MINOCIN,DYNACIN) capsule 100 mg  100 mg Oral BID PAWEL Iyer,  MD   100 mg at 04/29/25 1957    minoxidil (LONITEN) tablet 5 mg  5 mg Oral Daily PAWEL Iyer MD   5 mg at 04/29/25 0838    [Transfer Hold] ondansetron ODT (ZOFRAN-ODT) disintegrating tablet 4 mg  4 mg Oral Q6H PRN PAWLE Iyer MD         Or    [Transfer Hold] ondansetron (ZOFRAN) injection 4 mg  4 mg Intravenous Q6H PRN PAWEL Iyer MD        [Transfer Hold] oxyCODONE-acetaminophen (PERCOCET)  MG per tablet 1 tablet  1 tablet Oral Q4H PRN PAWEL Iyer MD   1 tablet at 04/30/25 0331    [Transfer Hold] oxyCODONE-acetaminophen (PERCOCET) 7.5-325 MG per tablet 1 tablet  1 tablet Oral Q4H PRN PAWEL Iyer MD        [Transfer Hold] pantoprazole (PROTONIX) EC tablet 40 mg  40 mg Oral Q AM PAWEL Iyer MD   40 mg at 04/29/25 0520    potassium chloride (KAYCIEL) 20 mEq/15 mL solution 40 mEq  40 mEq Oral Daily PAWEL Iyer MD   40 mEq at 04/29/25 0837    [Transfer Hold] sodium chloride 0.9 % flush 10 mL  10 mL Intravenous PRN PAWEL Iyer MD        sodium chloride 0.9 % flush 10 mL  10 mL Intravenous Q12H PAWEL Iyer MD        sodium chloride 0.9 % flush 10 mL  10 mL Intravenous PRN PAWEL Iyer MD        [Transfer Hold] sodium chloride 0.9 % flush 3 mL  3 mL Intravenous Q12H PAWEL Iyer MD   3 mL at 04/29/25 1958    [Transfer Hold] sodium chloride 0.9 % infusion 40 mL  40 mL Intravenous PRN PAWEL Iyer MD        sodium chloride 0.9 % infusion 40 mL  40 mL Intravenous PRN PAWEL Iyer MD        [Transfer Hold] tamsulosin (FLOMAX) 24 hr capsule 0.4 mg  0.4 mg Oral BID PAWEL Iyer MD   0.4 mg at 04/29/25 1958         ALLERGIES:    Allergies   No Known Allergies       Lumbosacral radiculopathy    Lumbar radiculopathy    Lumbosacral disc disease    Lumbar spondylosis    BPH with obstruction/lower urinary tract symptoms    MARTI (generalized anxiety disorder)    Drug-induced constipation     Blood pressure 126/67, pulse 72,  "temperature 98.5 °F (36.9 °C), temperature source Oral, resp. rate 18, height 185 cm (72.84\"), weight 110 kg (242 lb 4.6 oz), SpO2 96%.        Subjective:  Symptoms:  Stable.    Diet:  NPO.    Activity level: Impaired due to pain.    Pain:  He complains of pain that is moderate.  He reports pain is unchanged.  Pain is partially controlled.       Review of Systems  Objective:  General Appearance:  Comfortable, well-appearing, in no acute distress and in pain.    Vital signs: (most recent): Blood pressure 126/67, pulse 72, temperature 98.5 °F (36.9 °C), temperature source Oral, resp. rate 18, height 185 cm (72.84\"), weight 110 kg (242 lb 4.6 oz), SpO2 96%.  Vital signs are normal.    Output: Producing urine and minimal stool output.    HEENT: Normal HEENT exam.    Lungs:  Normal effort and normal respiratory rate.    Heart: Normal rate.  Regular rhythm.    Abdomen: Abdomen is soft.  Hypoactive bowel sounds.   There is generalized tenderness.     Extremities: Decreased range of motion.    Neurological: Patient is alert.    Skin:  Warm and dry.                      Labs:  Lab Results (last 72 hours)         Procedure Component Value Units Date/Time     Folate [[id number]]  (Normal) Collected: 04/29/25 0314     Specimen: Blood Updated: 04/29/25 1200       Folate 11.90 ng/mL       Narrative:       Results may be falsely increased if patient taking Biotin.        Vitamin B12 [[id number]]  (Normal) Collected: 04/29/25 0314     Specimen: Blood Updated: 04/29/25 1200       Vitamin B-12 713 pg/mL       Narrative:       Results may be falsely increased if patient taking Biotin.        CBC & Differential [[id number]]  (Abnormal) Collected: 04/29/25 0315     Specimen: Blood Updated: 04/29/25 0414     Narrative:       The following orders were created for panel order CBC & Differential.  Procedure                               Abnormality         Status                     ---------                               -----------         " ------                     CBC Auto Differential[[id number]]        Abnormal            Final result                  Please view results for these tests on the individual orders.     CBC Auto Differential [[id number]]  (Abnormal) Collected: 04/29/25 0315     Specimen: Blood Updated: 04/29/25 0414       WBC 6.44 10*3/mm3         RBC 3.53 10*6/mm3         Hemoglobin 10.5 g/dL         Hematocrit 31.4 %         MCV 89.0 fL         MCH 29.7 pg         MCHC 33.4 g/dL         RDW 12.8 %         RDW-SD 41.5 fl         MPV 9.1 fL         Platelets 237 10*3/mm3         Neutrophil % 71.5 %         Lymphocyte % 19.3 %         Monocyte % 8.1 %         Eosinophil % 0.5 %         Basophil % 0.3 %         Immature Grans % 0.3 %         Neutrophils, Absolute 4.61 10*3/mm3         Lymphocytes, Absolute 1.24 10*3/mm3         Monocytes, Absolute 0.52 10*3/mm3         Eosinophils, Absolute 0.03 10*3/mm3         Basophils, Absolute 0.02 10*3/mm3         Immature Grans, Absolute 0.02 10*3/mm3         nRBC 0.0 /100 WBC       Basic Metabolic Panel [[id number]]  (Abnormal) Collected: 04/29/25 0315     Specimen: Blood Updated: 04/29/25 0356       Glucose 95 mg/dL         BUN 6 mg/dL         Creatinine 0.81 mg/dL         Sodium 139 mmol/L         Potassium 3.2 mmol/L         Chloride 104 mmol/L         CO2 27.0 mmol/L         Calcium 8.2 mg/dL         BUN/Creatinine Ratio 7.4       Anion Gap 8.0 mmol/L         eGFR 102.2 mL/min/1.73       Narrative:       GFR Categories in Chronic Kidney Disease (CKD)                         GFR Category          GFR (mL/min/1.73)    Interpretation  G1                       90 or greater              Normal or high (1)  G2                               60-89                Mild decrease (1)  G3a                   45-59                Mild to moderate decrease  G3b                   30-44                Moderate to severe decrease  G4                    15-29                Severe decrease  G5                     "14 or less           Kidney failure                                                 (1)In the absence of evidence of kidney disease, neither GFR category G1 or G2 fulfill the criteria for CKD.     eGFR calculation 2021 CKD-EPI creatinine equation, which does not include race as a factor     Iron Profile [[id number]]  (Abnormal) Collected: 04/29/25 0315     Specimen: Blood Updated: 04/29/25 0356       Iron 30 mcg/dL         Iron Saturation (TSAT) 15 %         Transferrin 136 mg/dL         TIBC 203 mcg/dL                  Imaging Results (Last 72 Hours)         Procedure Component Value Units Date/Time     XR Abdomen 1 View [[id number]] Collected: 04/28/25 1248       Updated: 04/28/25 1254     Narrative:       EXAM: XR ABDOMEN 1 VW-      DATE: 4/28/2025 11:23 AM     HISTORY: no count in OR       COMPARISON: 3/1/2024.     TECHNIQUE: Single supine view of the abdomen. 1 image.     FINDINGS:    Limited evaluation for free air on supine imaging. Nonobstructive supine  bowel gas pattern with gas and stool to the level of the rectum.     Postoperative and degenerative changes of the lumbosacral spine, not  optimally assessed on this exam. Surgical clips at the LEFT pelvis.     Degenerative changes of the hips, not optimally assessed on this exam.     There is a 1 cm density projecting at the RIGHT iliac wing, which could  represent bone lesion, ingested contents, or structure external to the  patient.           Impression:       1. Postoperative and degenerative changes of the lumbosacral spine, not  optimally assessed on this exam. Surgical clips at the LEFT pelvis.     2. There is a 1 cm density projecting at the RIGHT iliac wing, could  represent bone lesion, ingested contents, or structure external to the  patient. Consider correlation with serum PSA and CT pelvis for further  evaluation.     Exam was tagged in PACS with \"incidental findings\" to assist in  appropriate follow up.     This report was signed and finalized on " 4/28/2025 12:51 PM by Dr Kahtleen Carballo MD.        XR Spine Lumbar AP & Lateral [[id number]] Collected: 04/28/25 1227       Updated: 04/28/25 1231     Narrative:       XR SPINE LUMBAR AP AND LATERAL- 4/28/2025 9:15 AM     HISTORY: alif     COMPARISON: None     FLUOROSCOPY TIME: 10.3 seconds     FLUOROSCOPY DOSE: 10.0 mGy     NUMBER OF IMAGES: 5        Impression:          Intraoperative fluoroscopic images during lumbar fusion.     Please refer to the operative note for more details.     This report was signed and finalized on 4/28/2025 12:28 PM by Dr. Cody Rogers MD.        FL C Arm During Surgery [[id number]] Resulted: 04/28/25 1223       Updated: 04/28/25 1223     Narrative:       This procedure was auto-finalized with no dictation required.                         Assessment:     Condition: In stable condition.  Improving.        Plan:   Transfer Plan: To operating room for second step.  Encourage ambulation and per physical therapy.  NPO.   (     Patient currently n.p.o. for second step of surgery this morning, medically stable for surgical intervention.     BPH with lower urinary tract symptoms-continue with alpha-blocker for now.  Judicious use of opioid analgesics early ambulation and aggressive bowel pattern in the immediate postoperative period.     Constipation/obstipation-continue with stool softeners after second step of surgery today can stimulate bowel movement with cathartics.     MARTI-stable     Patient stable for surgical intervention today, will follow postoperatively work on discharge planning 1-2 days.).      Problem List:     Lumbosacral radiculopathy    Lumbar radiculopathy    Lumbosacral disc disease    Lumbar spondylosis    BPH with obstruction/lower urinary tract symptoms    MARTI (generalized anxiety disorder)    Drug-induced constipation     Maged Quigley MD  4/30/2025

## 2025-04-30 NOTE — PLAN OF CARE
Goal Outcome Evaluation:  Plan of Care Reviewed With: patient        Progress: no change  Outcome Evaluation: The patient presents alert and oriented x4 lying in bed with LSO in the room. The patient remembers 0/3 spinal restrictions, but remembers when to wear his LSO. He was able to perform his bed mobility abiding by his spinal restrictions. He demonstrates no focal neurological deficits in strength, sensation, or coordination. He was able to ambulate in the hallway safely and clear his L foot safely. PT will continue to work with him to encourage continued activity between surgeries and education on spinal restrictions.    Anticipated Discharge Disposition (PT): home with assist

## 2025-04-30 NOTE — OP NOTE
LUMBAR LATERAL INTERBODY FUSION  Procedure Note    Kurtis Ross Blaine  4/30/2025    Pre-op Diagnosis:     1. Status post hemilaminectomy, decompression, partial facetectomy decompression, bilateral L3-4, left L5-S1, 11/13/2023    2. Status post irrigation and debridement posterior lumbar wound, 12/13/2023    3. Status post revision irrigation and debridement posterior lumbar wound, 02/16/2024   4.  Residual chronic low back pain, left worse than right  5.  Residual left buttock, thigh, and leg radiculopathy  6.  Residual right lateral and anterior thigh radiculopathy  7.  Residual neurogenic claudication  8.  Degenerative disc disease L3-S1  9.  Facet arthropathy L2-S1  10.  Degenerative scoliosis, concave right T12-L4, concave left L5-S1  11.  Congenital short pedicle syndrome  12.  Probable autofusion L4-5  13.  Severe foraminal stenosis right L3-4, left L5-S1  14.  Status post anterior decompression, ALIF with instrumentation L5-S1, 4/28/2025    Post-op Diagnosis:    same    Procedure/CPT® Codes:    1.  Left lateral lumbar interbody fusion L3-4  2.  Anterior spinal instrumentation L3-4 ([facility] ATEC lateral plate and screws)  3.  Use of titanium interbody biomechanical device for fusion L3-4 (ATEC titanium spacer)  4.  Use of allograft bone matrix for fusion L3-4 (Induce NMP Fibers)  5.  Use of fluoroscopy for confirmation of surgical level, placement of titanium spacer and instrumentation  6.  Intraoperative neural monitoring    Spinal Surgery Levels Completed:1 Level     Anesthesia: General    Surgeon: FILIBERTO Iyer MD    Assistant: Huang Avila PA-C    Estimated Blood Loss: Minimal    Complications: None    Condition: Stable to PACU.    Indications:    The patient is a 59-year-old who sees Shira Worthington NP for medical issues.  He originally presented to the office with chronic low back pain along with mainly left leg radiculopathy and symptoms consistent with neurogenic claudication.  While we briefly  discussed a decompression and fusion procedure, we elected to proceed with a smaller decompression procedure alone at L3-4 and L5-S1.  This procedure was performed on 11/13/2023.  Unfortunately, the surgery was complicated by a draining infected wound that required two irrigation and debridement procedures.  He presented back to the office with residual chronic low back pain as well as residual leg radiculopathy and symptoms consistent with neurogenic claudication.  Repeat imaging studies revealed degenerative disc disease from L3-S1 with facet arthropathy from L2-S1 and congenital short pedicles syndrome.  He was also noted to have a degenerative scoliosis concave to the right from T12-L4 and concave to the left at L5-S1.  He was likely to have a autofusion at L4-5.  These findings created severe foraminal stenosis on the right side of L3-4 on the left side of L5-S1.     After failing conservative measures, it was mutually decided that surgery would be the best option. Risks, benefits, and complications of surgery were discussed with the patient. The patient appeared well informed and wished to proceed. We specifically discussed the risk of infection, blood loss, nerve root injury, CSF leak, and the possibility of incomplete resolution of symptoms. We also discussed the possible risk of a nonunion and the potential need for additional surgery in the event of a pseudoarthrosis or hardware failure.     We elected proceed with a staged operation.  The first stage of the procedure was performed on 4/28/2025 and involved an anterior decompression and ALIF with instrumentation of L5-S1.  Today we return to surgery for the second lateral stage of the procedure to address the L3-4 level.  It is planned we will be proceeding with a final posterior procedure in a few days.    Operative Procedure:    After obtaining informed consent and verifying the correct operative site, the patient was brought to the operating room and  placed supine on operating table.  A general anesthetic was provided by the anesthesia service with the assistance of an endotracheal tube.  Once this was appropriately positioned and secured, the patient was carefully rotated into the lateral decubitus position with the left side up. All bony prominences were well-padded.  3 inch wide cloth tape was used to maintain the patient's position.  It was also used to tip open the pelvis slightly allowing better access to the lumbar spine through a lateral approach.  Fluoroscopy was then used to identify the L3-4 level, and the skin was marked for our planned incision.  The left flank was then prepped and draped in the usual sterile fashion.  A surgical timeout was taken to confirm this was the correct patient, we were working at the correct level, and that preoperative antibiotics were given in a timely fashion.    An oblique incision was created of the left flank using a 10 blade scalpel directly centered over the L3-4 segment. Dissection was carried bluntly through subcutaneous tissues. Blunt dissection was also carried between the external oblique and internal oblique musculature. The transversalis fascia was pierced allowing access to the retroperitoneal space. At this point a series of neuro monitoring probes were used to safely access the L3-4 level. We used stimulated and free run EMG for this purpose. Once nerve roots were confirmed to be out of harm's way, self retaining retractors were placed for continuous exposure.     An annulotomy was then created at the L3-4 area using a 15 blade scalpel on a long handle. A Law elevator was used to remove disc material off of the endplates. Disc material was removed using Kerrisons, pituitaries, and forward angled curettes. Once all disc material had been retrieved, I used the Law elevator to divide the contralateral annulus. This assisted with mobilization of the vertebral body. We then used a series of endplate scrapers  to prepare the endplates for interbody fusion. Trial spacers were malleted into position and for the disc space it was felt that a 12 mm x 60 mm implant from [facility] would be the best fit to restore disc height. The disc space was then thoroughly irrigated with saline solution.     A titanium spacer from the [facility] instrumentation set measuring 12 mm x 60 mm x 22 mm was then packed with allograft bone matrix called Induce NMP Fibers as tightly as possible. This titanium spacer was then malleted into the L3-4 disc space under fluoroscopic guidance. It was placed as a titanium interbody biomechanical device to assist with interbody fusion. Once this spacer was confirmed to be properly positioned, I chose a 2-hole plate to help augment the fusion of L3-4. This plate was held into position using screws. I placed a 45 mm screw into L3 and I placed a shorter 35 mm screw into L4. The shorter screw was used to hopefully accommodate the placement of a pedicle screw on the contralateral L4 pedicle as part of the next stage of the procedure.     The wound was then irrigated thoroughly. A thorough inspection was then undertaken to ensure that we had adequate hemostasis. Bleeding at this point was controlled using Avitene and bipolar cautery. Closure was then accomplished with a #1 Vicryl reapproximating the internal and external oblique musculature. Immediate subcutaneous cutaneous tissues were closed with a 3-0 Vicryl. And skin closure was accomplished using Mastisol and Steri-Strips. The wound was then sterilely dressed. The patient was then carefully rotated supine onto a hospital gurElliott, extubated, and sent to the recovery room in good stable condition.     The patient tolerated the procedure well. There were no complications. We estimate blood loss to be minimal. The patient remained hemodynamically stable.     Intraoperative neuro monitoring was ordered and carried out throughout the procedure to add an increased level of  safety for the patient.  The interpreting physician was available by means of real-time continuous, bidirectional, remote audio and visual communication as needed throughout the entire procedure.  Modalities used during the procedure included SSEP, EEG, MEP, EMG, and TOF.  There were no neuro monitoring signal changes during the procedure.    Huang Avila PA-C provided critical assistance during the procedure. His assistance was medically necessary in order to allow the procedure to occur in the most safe and efficient manner.    FILIBERTO Iyer MD     Date: 4/30/2025  Time: 08:29 CDT

## 2025-04-30 NOTE — ANESTHESIA PREPROCEDURE EVALUATION
Anesthesia Evaluation     Patient summary reviewed and Nursing notes reviewed   no history of anesthetic complications:   NPO Solid Status: > 8 hours  NPO Liquid Status: > 8 hours           Airway   Mallampati: I  TM distance: >3 FB  No difficulty expected  Dental      Pulmonary    (+) asthma (as child),sleep apnea  Cardiovascular   Exercise tolerance: good (4-7 METS)    (+) hypertension  (-) CAD      Neuro/Psych  (-) seizures, TIA, CVA  GI/Hepatic/Renal/Endo    (+) obesity, GERD  (-) liver disease, no renal disease, diabetes    Musculoskeletal     Abdominal    Substance History      OB/GYN          Other   arthritis,                     Anesthesia Plan    ASA 2     general     intravenous induction     Anesthetic plan, risks, benefits, and alternatives have been provided, discussed and informed consent has been obtained with: patient.      CODE STATUS:    Code Status (Patient has no pulse and is not breathing): CPR (Attempt to Resuscitate)  Medical Interventions (Patient has pulse or is breathing): Full Support

## 2025-04-30 NOTE — PLAN OF CARE
Goal Outcome Evaluation:           Progress: improving          Patient alert and responsive able to make needs known. Npo after midnight for part 2 of surgery. Up ad dinesh today. No acute distress noted.

## 2025-04-30 NOTE — ANESTHESIA PROCEDURE NOTES
Airway  Reason: elective    Date/Time: 4/30/2025 7:08 AM  Airway not difficult    General Information and Staff    Patient location during procedure: OR  CRNA/CAA: Master Warner CRNA    Indications and Patient Condition  Indications for airway management: airway protection    Preoxygenated: yes  MILS maintained throughout    Mask difficulty assessment: 1 - vent by mask    Final Airway Details    Final airway type: endotracheal airway      Successful airway: ETT  Cuffed: yes   Successful intubation technique: direct laryngoscopy  Endotracheal tube insertion site: oral  Blade: Fernández  Blade size: 2  ETT size (mm): 7.5  Cormack-Lehane Classification: grade I - full view of glottis  Placement verified by: chest auscultation and capnometry   Cuff volume (mL): 5  Measured from: gums  ETT/EBT to gums (cm): 22  Number of attempts at approach: 1  Assessment: lips, teeth, and gum same as pre-op and atraumatic intubation             Name of MD Notified: (Please Specify)

## 2025-04-30 NOTE — ANESTHESIA POSTPROCEDURE EVALUATION
"Patient: Kurtis Valladares    Procedure Summary       Date: 04/30/25 Room / Location:  [facility] OR  /  [facility] OR    Anesthesia Start: 0702 Anesthesia Stop: 0835    Procedure: LEFT LATERAL LUMBAR INTERBODY FUSION WITH INSTRUMENTATION L3-4 (Left: Spine Lumbar) Diagnosis: (M54.16)    Surgeons: PAWEL Iyer MD Provider: Master Warner CRNA    Anesthesia Type: general ASA Status: 2            Anesthesia Type: general    Vitals  Vitals Value Taken Time   /72 04/30/25 09:34   Temp 98 °F (36.7 °C) 04/30/25 09:34   Pulse 88 04/30/25 09:34   Resp 14 04/30/25 09:34   SpO2 97 % 04/30/25 09:34           Post Anesthesia Care and Evaluation    Patient location during evaluation: PACU  Patient participation: complete - patient participated  Level of consciousness: awake and alert  Pain management: adequate    Airway patency: patent  Anesthetic complications: No anesthetic complications    Cardiovascular status: acceptable  Respiratory status: acceptable  Hydration status: acceptable    Comments: Blood pressure 128/74, pulse 96, temperature 97.9 °F (36.6 °C), temperature source Oral, resp. rate 16, height 185 cm (72.84\"), weight 110 kg (242 lb 4.6 oz), SpO2 96%.    Pt discharged from PACU based on nettie score >8  No anesthesia care post op    "

## 2025-04-30 NOTE — PROGRESS NOTES
Kurtis Valladares is a 59 y.o. male patient.      Postoperative day #2 from first step with planned second lateral stage of procedure today.    N.p.o. for surgical intervention-medically stable.    Wound looks good.  Current Facility-Administered Medications   Medication Dose Route Frequency Provider Last Rate Last Admin    [Transfer Hold] acetaminophen (TYLENOL) tablet 650 mg  650 mg Oral Q4H PRN PAWEL Iyer MD        Or    [Transfer Hold] acetaminophen (TYLENOL) 160 MG/5ML oral solution 650 mg  650 mg Oral Q4H PRN PAWEL Iyer MD        Or    [Transfer Hold] acetaminophen (TYLENOL) suppository 650 mg  650 mg Rectal Q4H PRN PAWEL Iyer MD        [Transfer Hold] albuterol sulfate HFA (PROVENTIL HFA;VENTOLIN HFA;PROAIR HFA) inhaler 2 puff  2 puff Inhalation PRN PAWEL Iyer MD        [Transfer Hold] sennosides-docusate (PERICOLACE) 8.6-50 MG per tablet 2 tablet  2 tablet Oral BID PAWEL Iyer MD   2 tablet at 04/29/25 1957    And    [Transfer Hold] polyethylene glycol (MIRALAX) packet 17 g  17 g Oral Daily PRN PAWEL Iyer MD        And    [Transfer Hold] bisacodyl (DULCOLAX) EC tablet 5 mg  5 mg Oral Daily PRN PAWEL Iyer MD        And    [Transfer Hold] bisacodyl (DULCOLAX) suppository 10 mg  10 mg Rectal Daily PRN PAWEL Iyer MD        [Transfer Hold] budesonide-formoterol (SYMBICORT) 160-4.5 MCG/ACT inhaler 2 puff  2 puff Inhalation BID - RT PAWEL Iyer MD   2 puff at 04/30/25 0518    ceFAZolin 2000 mg IVPB in 100 mL NS (MBP)  2,000 mg Intravenous On Call to OR PAWEL Iyer MD        [Transfer Hold] citalopram (CeleXA) tablet 40 mg  40 mg Oral Daily PAWEL Iyer MD   40 mg at 04/29/25 0838    [Transfer Hold] fluticasone (FLONASE) 50 MCG/ACT nasal spray 2 spray  2 spray Nasal Daily PAWEL Iyer MD   2 spray at 04/29/25 1014    [Transfer Hold] hydroCHLOROthiazide tablet 50 mg  50 mg Oral Daily PAWEL Iyer MD   50 mg  at 04/29/25 0838    [Transfer Hold] HYDROmorphone (DILAUDID) injection 0.5 mg  0.5 mg Intravenous Q3H PRN PAWEL Iyer MD        And    [Transfer Hold] naloxone (NARCAN) injection 0.4 mg  0.4 mg Intravenous Q5 Min PRN PAWEL Iyer MD        lactated ringers infusion  100 mL/hr Intravenous Continuous PRN PAWEL Iyer MD        lactated ringers infusion  20 mL/hr Intravenous Continuous Shahram Newberry MD 20 mL/hr at 04/30/25 0631 20 mL/hr at 04/30/25 0631    [Transfer Hold] magnesium oxide (MAG-OX) tablet 400 mg  400 mg Oral Daily PAWEL Iyer MD   400 mg at 04/29/25 0838    [Transfer Hold] methocarbamol (ROBAXIN) tablet 1,000 mg  1,000 mg Oral 4x Daily PRN PAWEL Iyer MD        minocycline (MINOCIN,DYNACIN) capsule 100 mg  100 mg Oral BID PAWEL Iyer MD   100 mg at 04/29/25 1957    minoxidil (LONITEN) tablet 5 mg  5 mg Oral Daily PAWEL Iyer MD   5 mg at 04/29/25 0838    [Transfer Hold] ondansetron ODT (ZOFRAN-ODT) disintegrating tablet 4 mg  4 mg Oral Q6H PRN PAWEL Iyer MD        Or    [Transfer Hold] ondansetron (ZOFRAN) injection 4 mg  4 mg Intravenous Q6H PRN PAWEL Iyer MD        [Transfer Hold] oxyCODONE-acetaminophen (PERCOCET)  MG per tablet 1 tablet  1 tablet Oral Q4H PRN PAWEL Iyer MD   1 tablet at 04/30/25 0331    [Transfer Hold] oxyCODONE-acetaminophen (PERCOCET) 7.5-325 MG per tablet 1 tablet  1 tablet Oral Q4H PRN PAWEL Iyer MD        [Transfer Hold] pantoprazole (PROTONIX) EC tablet 40 mg  40 mg Oral Q AM PAWEL Iyer MD   40 mg at 04/29/25 0520    potassium chloride (KAYCIEL) 20 mEq/15 mL solution 40 mEq  40 mEq Oral Daily PAWEL Iyer MD   40 mEq at 04/29/25 0837    [Transfer Hold] sodium chloride 0.9 % flush 10 mL  10 mL Intravenous PRN PAWEL Iyer MD        sodium chloride 0.9 % flush 10 mL  10 mL Intravenous Q12H PAWEL Iyer MD        sodium chloride 0.9 % flush 10 mL  10 mL  "Intravenous PRN PAWEL Iyer MD        [Transfer Hold] sodium chloride 0.9 % flush 3 mL  3 mL Intravenous Q12H PAWEL Iyer MD   3 mL at 04/29/25 1958    [Transfer Hold] sodium chloride 0.9 % infusion 40 mL  40 mL Intravenous PRN PAWEL Iyer MD        sodium chloride 0.9 % infusion 40 mL  40 mL Intravenous PRN PAWEL Iyer MD        [Transfer Hold] tamsulosin (FLOMAX) 24 hr capsule 0.4 mg  0.4 mg Oral BID PAWEL Iyer MD   0.4 mg at 04/29/25 1958     ALLERGIES:  No Known Allergies    Lumbosacral radiculopathy    Lumbar radiculopathy    Lumbosacral disc disease    Lumbar spondylosis    BPH with obstruction/lower urinary tract symptoms    MARTI (generalized anxiety disorder)    Drug-induced constipation    Blood pressure 126/67, pulse 72, temperature 98.5 °F (36.9 °C), temperature source Oral, resp. rate 18, height 185 cm (72.84\"), weight 110 kg (242 lb 4.6 oz), SpO2 96%.      Subjective:  Symptoms:  Stable.    Diet:  NPO.    Activity level: Impaired due to pain.    Pain:  He complains of pain that is moderate.  He reports pain is unchanged.  Pain is partially controlled.      Review of Systems  Objective:  General Appearance:  Comfortable, well-appearing, in no acute distress and in pain.    Vital signs: (most recent): Blood pressure 126/67, pulse 72, temperature 98.5 °F (36.9 °C), temperature source Oral, resp. rate 18, height 185 cm (72.84\"), weight 110 kg (242 lb 4.6 oz), SpO2 96%.  Vital signs are normal.    Output: Producing urine and minimal stool output.    HEENT: Normal HEENT exam.    Lungs:  Normal effort and normal respiratory rate.    Heart: Normal rate.  Regular rhythm.    Abdomen: Abdomen is soft.  Hypoactive bowel sounds.   There is generalized tenderness.     Extremities: Decreased range of motion.    Neurological: Patient is alert.    Skin:  Warm and dry.                Labs:  Lab Results (last 72 hours)       Procedure Component Value Units Date/Time    Folate " [[id number]]  (Normal) Collected: 04/29/25 0314    Specimen: Blood Updated: 04/29/25 1200     Folate 11.90 ng/mL     Narrative:      Results may be falsely increased if patient taking Biotin.      Vitamin B12 [[id number]]  (Normal) Collected: 04/29/25 0314    Specimen: Blood Updated: 04/29/25 1200     Vitamin B-12 713 pg/mL     Narrative:      Results may be falsely increased if patient taking Biotin.      CBC & Differential [[id number]]  (Abnormal) Collected: 04/29/25 0315    Specimen: Blood Updated: 04/29/25 0414    Narrative:      The following orders were created for panel order CBC & Differential.  Procedure                               Abnormality         Status                     ---------                               -----------         ------                     CBC Auto Differential[[id number]]        Abnormal            Final result                 Please view results for these tests on the individual orders.    CBC Auto Differential [[id number]]  (Abnormal) Collected: 04/29/25 0315    Specimen: Blood Updated: 04/29/25 0414     WBC 6.44 10*3/mm3      RBC 3.53 10*6/mm3      Hemoglobin 10.5 g/dL      Hematocrit 31.4 %      MCV 89.0 fL      MCH 29.7 pg      MCHC 33.4 g/dL      RDW 12.8 %      RDW-SD 41.5 fl      MPV 9.1 fL      Platelets 237 10*3/mm3      Neutrophil % 71.5 %      Lymphocyte % 19.3 %      Monocyte % 8.1 %      Eosinophil % 0.5 %      Basophil % 0.3 %      Immature Grans % 0.3 %      Neutrophils, Absolute 4.61 10*3/mm3      Lymphocytes, Absolute 1.24 10*3/mm3      Monocytes, Absolute 0.52 10*3/mm3      Eosinophils, Absolute 0.03 10*3/mm3      Basophils, Absolute 0.02 10*3/mm3      Immature Grans, Absolute 0.02 10*3/mm3      nRBC 0.0 /100 WBC     Basic Metabolic Panel [[id number]]  (Abnormal) Collected: 04/29/25 0315    Specimen: Blood Updated: 04/29/25 0356     Glucose 95 mg/dL      BUN 6 mg/dL      Creatinine 0.81 mg/dL      Sodium 139 mmol/L      Potassium 3.2 mmol/L      Chloride 104 mmol/L       CO2 27.0 mmol/L      Calcium 8.2 mg/dL      BUN/Creatinine Ratio 7.4     Anion Gap 8.0 mmol/L      eGFR 102.2 mL/min/1.73     Narrative:      GFR Categories in Chronic Kidney Disease (CKD)      GFR Category          GFR (mL/min/1.73)    Interpretation  G1                     90 or greater         Normal or high (1)  G2                      60-89                Mild decrease (1)  G3a                   45-59                Mild to moderate decrease  G3b                   30-44                Moderate to severe decrease  G4                    15-29                Severe decrease  G5                    14 or less           Kidney failure          (1)In the absence of evidence of kidney disease, neither GFR category G1 or G2 fulfill the criteria for CKD.    eGFR calculation 2021 CKD-EPI creatinine equation, which does not include race as a factor    Iron Profile [[id number]]  (Abnormal) Collected: 04/29/25 0315    Specimen: Blood Updated: 04/29/25 0356     Iron 30 mcg/dL      Iron Saturation (TSAT) 15 %      Transferrin 136 mg/dL      TIBC 203 mcg/dL             Imaging Results (Last 72 Hours)       Procedure Component Value Units Date/Time    XR Abdomen 1 View [[id number]] Collected: 04/28/25 1248     Updated: 04/28/25 1254    Narrative:      EXAM: XR ABDOMEN 1 VW-      DATE: 4/28/2025 11:23 AM     HISTORY: no count in OR       COMPARISON: 3/1/2024.     TECHNIQUE: Single supine view of the abdomen. 1 image.     FINDINGS:    Limited evaluation for free air on supine imaging. Nonobstructive supine  bowel gas pattern with gas and stool to the level of the rectum.     Postoperative and degenerative changes of the lumbosacral spine, not  optimally assessed on this exam. Surgical clips at the LEFT pelvis.     Degenerative changes of the hips, not optimally assessed on this exam.     There is a 1 cm density projecting at the RIGHT iliac wing, which could  represent bone lesion, ingested contents, or structure external to  "the  patient.          Impression:      1. Postoperative and degenerative changes of the lumbosacral spine, not  optimally assessed on this exam. Surgical clips at the LEFT pelvis.     2. There is a 1 cm density projecting at the RIGHT iliac wing, could  represent bone lesion, ingested contents, or structure external to the  patient. Consider correlation with serum PSA and CT pelvis for further  evaluation.     Exam was tagged in PACS with \"incidental findings\" to assist in  appropriate follow up.     This report was signed and finalized on 4/28/2025 12:51 PM by Dr Kathleen Carballo MD.       XR Spine Lumbar AP & Lateral [[id number]] Collected: 04/28/25 1227     Updated: 04/28/25 1231    Narrative:      XR SPINE LUMBAR AP AND LATERAL- 4/28/2025 9:15 AM     HISTORY: alif     COMPARISON: None     FLUOROSCOPY TIME: 10.3 seconds     FLUOROSCOPY DOSE: 10.0 mGy     NUMBER OF IMAGES: 5       Impression:         Intraoperative fluoroscopic images during lumbar fusion.     Please refer to the operative note for more details.     This report was signed and finalized on 4/28/2025 12:28 PM by Dr. Cody Rogers MD.       FL C Arm During Surgery [[id number]] Resulted: 04/28/25 1223     Updated: 04/28/25 1223    Narrative:      This procedure was auto-finalized with no dictation required.                  Assessment:    Condition: In stable condition.  Improving.       Plan:   Transfer Plan: To operating room for second step.  Encourage ambulation and per physical therapy.  NPO.   (    Patient currently n.p.o. for second step of surgery this morning, medically stable for surgical intervention.    BPH with lower urinary tract symptoms-continue with alpha-blocker for now.  Judicious use of opioid analgesics early ambulation and aggressive bowel pattern in the immediate postoperative period.    Constipation/obstipation-continue with stool softeners after second step of surgery today can stimulate bowel movement with " cathartics.    MARTI-stable    Patient stable for surgical intervention today, will follow postoperatively work on discharge planning 1-2 days.).     Problem List:     Lumbosacral radiculopathy    Lumbar radiculopathy    Lumbosacral disc disease    Lumbar spondylosis    BPH with obstruction/lower urinary tract symptoms    MARTI (generalized anxiety disorder)    Drug-induced constipation    Maged Quigley MD  4/30/2025

## 2025-04-30 NOTE — PLAN OF CARE
Goal Outcome Evaluation:              Outcome Evaluation: ALOX4. RA. stand by with LSO brace. on prevena wound vac. NPO for possible surgery today.

## 2025-05-01 LAB
ANION GAP SERPL CALCULATED.3IONS-SCNC: 6 MMOL/L (ref 5–15)
BUN SERPL-MCNC: 9 MG/DL (ref 6–20)
BUN/CREAT SERPL: 12 (ref 7–25)
CALCIUM SPEC-SCNC: 8.1 MG/DL (ref 8.6–10.5)
CHLORIDE SERPL-SCNC: 102 MMOL/L (ref 98–107)
CO2 SERPL-SCNC: 28 MMOL/L (ref 22–29)
CREAT SERPL-MCNC: 0.75 MG/DL (ref 0.76–1.27)
EGFRCR SERPLBLD CKD-EPI 2021: 104.6 ML/MIN/1.73
GLUCOSE SERPL-MCNC: 85 MG/DL (ref 65–99)
MAGNESIUM SERPL-MCNC: 2.2 MG/DL (ref 1.6–2.6)
POTASSIUM SERPL-SCNC: 3 MMOL/L (ref 3.5–5.2)
SODIUM SERPL-SCNC: 136 MMOL/L (ref 136–145)

## 2025-05-01 PROCEDURE — 94799 UNLISTED PULMONARY SVC/PX: CPT

## 2025-05-01 PROCEDURE — 83735 ASSAY OF MAGNESIUM: CPT | Performed by: ORTHOPAEDIC SURGERY

## 2025-05-01 PROCEDURE — 97116 GAIT TRAINING THERAPY: CPT

## 2025-05-01 PROCEDURE — 80048 BASIC METABOLIC PNL TOTAL CA: CPT | Performed by: FAMILY MEDICINE

## 2025-05-01 PROCEDURE — 94760 N-INVAS EAR/PLS OXIMETRY 1: CPT

## 2025-05-01 RX ADMIN — Medication 400 MG: at 08:04

## 2025-05-01 RX ADMIN — OXYCODONE HYDROCHLORIDE AND ACETAMINOPHEN 1 TABLET: 7.5; 325 TABLET ORAL at 19:15

## 2025-05-01 RX ADMIN — TAMSULOSIN HYDROCHLORIDE 0.4 MG: 0.4 CAPSULE ORAL at 20:21

## 2025-05-01 RX ADMIN — MINOXIDIL 5 MG: 2.5 TABLET ORAL at 08:05

## 2025-05-01 RX ADMIN — OXYCODONE AND ACETAMINOPHEN 1 TABLET: 325; 10 TABLET ORAL at 08:04

## 2025-05-01 RX ADMIN — POLYETHYLENE GLYCOL 3350 17 G: 17 POWDER, FOR SOLUTION ORAL at 08:05

## 2025-05-01 RX ADMIN — SENNOSIDES, DOCUSATE SODIUM 2 TABLET: 50; 8.6 TABLET, FILM COATED ORAL at 08:04

## 2025-05-01 RX ADMIN — OXYCODONE AND ACETAMINOPHEN 1 TABLET: 325; 10 TABLET ORAL at 03:05

## 2025-05-01 RX ADMIN — ACETAMINOPHEN 650 MG: 325 TABLET, FILM COATED ORAL at 05:55

## 2025-05-01 RX ADMIN — OXYCODONE AND ACETAMINOPHEN 1 TABLET: 325; 10 TABLET ORAL at 13:10

## 2025-05-01 RX ADMIN — Medication 3 ML: at 08:08

## 2025-05-01 RX ADMIN — FLUTICASONE PROPIONATE 2 SPRAY: 50 SPRAY, METERED NASAL at 08:08

## 2025-05-01 RX ADMIN — POTASSIUM CHLORIDE 40 MEQ: 20 SOLUTION ORAL at 08:05

## 2025-05-01 RX ADMIN — BUDESONIDE AND FORMOTEROL FUMARATE DIHYDRATE 2 PUFF: 160; 4.5 AEROSOL RESPIRATORY (INHALATION) at 06:14

## 2025-05-01 RX ADMIN — MINOCYCLINE HYDROCHLORIDE 100 MG: 50 CAPSULE ORAL at 08:05

## 2025-05-01 RX ADMIN — PANTOPRAZOLE SODIUM 40 MG: 40 TABLET, DELAYED RELEASE ORAL at 05:28

## 2025-05-01 RX ADMIN — CITALOPRAM HYDROBROMIDE 40 MG: 20 TABLET ORAL at 08:04

## 2025-05-01 RX ADMIN — SENNOSIDES, DOCUSATE SODIUM 2 TABLET: 50; 8.6 TABLET, FILM COATED ORAL at 20:21

## 2025-05-01 RX ADMIN — TAMSULOSIN HYDROCHLORIDE 0.4 MG: 0.4 CAPSULE ORAL at 08:05

## 2025-05-01 RX ADMIN — BUDESONIDE AND FORMOTEROL FUMARATE DIHYDRATE 2 PUFF: 160; 4.5 AEROSOL RESPIRATORY (INHALATION) at 18:25

## 2025-05-01 RX ADMIN — Medication 3 ML: at 20:22

## 2025-05-01 RX ADMIN — MINOCYCLINE HYDROCHLORIDE 100 MG: 50 CAPSULE ORAL at 20:21

## 2025-05-01 NOTE — THERAPY TREATMENT NOTE
Acute Care - Physical Therapy Treatment Note  [facility]     Patient Name: Kurtis Valladares  : 1965  MRN: [id number]  Today's Date: 2025      Visit Dx:     ICD-10-CM ICD-9-CM   1. Impaired mobility [Z74.09]  Z74.09 799.89     Patient Active Problem List   Diagnosis    Right knee pain    Primary osteoarthritis of right knee    Effusion of right knee    Pain of right heel    Postcalcaneal bursitis of right foot    Pre-op evaluation    Abnormal nuclear stress test    Preop cardiovascular exam    Lumbar radiculopathy    Hypokalemia    Foraminal stenosis of lumbar region, severe    Bradycardia    Lumbosacral disc disease    Lumbar spondylosis    Lumbosacral radiculopathy    BPH with obstruction/lower urinary tract symptoms    MARTI (generalized anxiety disorder)    Drug-induced constipation     Past Medical History:   Diagnosis Date    Acute bronchitis     not since     Arthritis     Asthma     Dyspnea     Enlarged prostate     GERD (gastroesophageal reflux disease)     Hypertension     Pain in lower limb     Sleep apnea     history of, pt states he thinks it has resolved    Sprain of ankle     Wound dehiscence, surgical 2024    lumbar wound     Past Surgical History:   Procedure Laterality Date    ANTERIOR LUMBAR EXPOSURE N/A 2025    Procedure: ANTERIOR LUMBAR EXPOSURE;  Surgeon: Montana Rangel DO;  Location:  [facility] OR;  Service: Vascular;  Laterality: N/A;    CARDIAC CATHETERIZATION N/A 2020    Procedure: Left Heart Cath/PCI if indicated;  Surgeon: Hawa Dominguez MD;  Location:  [facility] CATH INVASIVE [facility]ATION;  Service: Cardiology    GASTRIC SLEEVE LAPAROSCOPIC      INCISION AND DRAINAGE POSTERIOR SPINE N/A 2023    Procedure: IRRIGATION AND DEBRIDEMENT POSTERIOR LUMBAR WOUND;  Surgeon: PAWEL Iyer MD;  Location:  [facility] OR;  Service: Orthopedic Spine;  Laterality: N/A;    INCISION AND DRAINAGE POSTERIOR SPINE N/A 2024    Procedure: IRRIGATION AND  DEBRIDEMENT LUMBAR WOUND DEHISCENCE;  Surgeon: PAWEL Iyer MD;  Location:  [facility] OR;  Service: Orthopedic Spine;  Laterality: N/A;    LUMBAR FUSION N/A 4/28/2025    Procedure: AMTERIOR DECOMPRESSION, ANTERIOR LUMBAR INTERBODY FUSION WITH INSTRUMENTATION L5-S1;  Surgeon: PAWEL Iyer MD;  Location:  [facility] OR;  Service: Orthopedic Spine;  Laterality: N/A;    LUMBAR LAMINECTOMY WITH FUSION N/A 11/13/2023    Procedure: HEMILAMINECTOMY, DECOMPRESSION, PARTIAL FACETECTOMY, FORAMINOTOMY BILATERAL L3-4, LEFT L5-S1;  Surgeon: PAWEL Iyer MD;  Location:  [facility] OR;  Service: Orthopedic Spine;  Laterality: N/A;     PT Assessment (Last 12 Hours)       PT Evaluation and Treatment       [facility] Name 05/01/25 0837 05/01/25 0741       Physical Therapy Time and Intention    Subjective Information complains of;pain  - --  -    Document Type therapy note (daily note)  - --    Mode of Treatment physical therapy  [facility] --    Session Not Performed -- other (see comments)  -    Comment, Session Not Performed -- pt requested to check back after pain med  -[facility] Name 05/01/25 0837          General Information    Existing Precautions/Restrictions fall;LSO;spinal;brace worn when out of bed  prevena  -[facility] Name 05/01/25 0837          Pain    Pretreatment Pain Rating 5/10  -     Posttreatment Pain Rating 5/10  -     Pain Location abdomen;back  -     Pain Side/Orientation left  -     Pain Management Interventions premedicated for activity  -     Response to Pain Interventions activity participation with tolerable pain  -       Row Name 05/01/25 0837          Bed Mobility    Comment, (Bed Mobility) up in room independent  -       Row Name 05/01/25 0837          Sit-Stand Transfer    Sit-Stand [place] (Transfers) independent  -       Row Name 05/01/25 0837          Stand-Sit Transfer    Stand-Sit [place] (Transfers) independent  -[facility] Name 05/01/25 0837          Gait/Stairs  (Locomotion)    [place] Level (Gait) supervision  -AH     Distance in Feet (Gait) 600  -AH       Row Name             Wound 04/28/25 1049 Left anterior abdomen Surgical Closed Surgical Incision    Wound - Properties Group Placement Date: 04/28/25  -CB Placement Time: 1049  -CB Present on Original Admission: N  -CB Side: Left  -CB Orientation: anterior  -CB Location: abdomen  -CB Primary Wound Type: Surgical  -CB Secondary Wound Type - Surgical: Closed Surgi  -CB    Retired Wound - Properties Group Placement Date: 04/28/25  -CB Placement Time: 1049  -CB Present on Original Admission: N  -CB Side: Left  -CB Orientation: anterior  -CB Location: abdomen  -CB    Retired Wound - Properties Group Placement Date: 04/28/25  -CB Placement Time: 1049  -CB Present on Original Admission: N  -CB Side: Left  -CB Orientation: anterior  -CB Location: abdomen  -CB    Retired Wound - Properties Group Date first assessed: 04/28/25  -CB Time first assessed: 1049  -CB Present on Original Admission: N  -CB Side: Left  -CB Location: abdomen  -CB      Row Name             Wound 04/30/25 0737 Left upper flank Surgical    Wound - Properties Group Placement Date: 04/30/25  -KT Placement Time: 0737  -KT Side: Left  -KT Orientation: upper  -KT Location: flank  -KT Primary Wound Type: Surgical  -KT    Retired Wound - Properties Group Placement Date: 04/30/25  -KT Placement Time: 0737  -KT Side: Left  -KT Orientation: upper  -KT Location: flank  -KT    Retired Wound - Properties Group Placement Date: 04/30/25  -KT Placement Time: 0737  -KT Side: Left  -KT Orientation: upper  -KT Location: flank  -KT    Retired Wound - Properties Group Date first assessed: 04/30/25  -KT Time first assessed: 0737  -KT Side: Left  -KT Location: flank  -KT      Row Name             NPWT (Negative Pressure Wound Therapy) 04/28/25 1227 Abdomen    NPWT (Negative Pressure Wound Therapy) - Properties Group Placement Date: 04/28/25  -CB Placement Time: 1227  -CB  Location: Abdomen  -CB    Retired NPWT (Negative Pressure Wound Therapy) - Properties Group Placement Date: 04/28/25  -CB Placement Time: 1227  -CB Location: Abdomen  -CB    Retired NPWT (Negative Pressure Wound Therapy) - Properties Group Placement Date: 04/28/25  -CB Placement Time: 1227  -CB Location: Abdomen  -CB    Retired NPWT (Negative Pressure Wound Therapy) - Properties Group Placement Date: 04/28/25  -CB Placement Time: 1227  -CB Location: Abdomen  -CB      Row Name 05/01/25 0837          Positioning and Restraints    Pre-Treatment Position standing in room  -AH     Post Treatment Position chair  -     Bathroom sitting;call light within reach;encouraged to call for assist  -               User Key  (r) = Recorded By, (t) = Taken By, (c) = Cosigned By      Initials Name Provider Type     Monica Lerma, PTA Physical Therapist Assistant    Kishore Herrera, RN Registered Nurse    Denis Brady RN Registered Nurse                    Physical Therapy Education       Title: PT OT SLP Therapies (In Progress)       Topic: Physical Therapy (In Progress)       Point: Mobility training (In Progress)       Learning Progress Summary            Patient Acceptance, E,TB, NR by MS at 4/30/2025 1526    Comment: role of PT in his care, spinal restrictions, use of LSO                      Point: Home exercise program (Not Started)       Learner Progress:  Not documented in this visit.              Point: Body mechanics (Not Started)       Learner Progress:  Not documented in this visit.              Point: Precautions (In Progress)       Learning Progress Summary            Patient Acceptance, E,TB, NR by MS at 4/30/2025 1526    Comment: role of PT in his care, spinal restrictions, use of LSO                                      User Key       Initials Effective Dates Name Provider Type Discipline    MS 07/11/23 -  Ramona Ross, PT, DPT, NCS Physical Therapist PT                  PT Recommendation and Plan          Outcome Measures       Row Name 05/01/25 0800 04/29/25 0800          How much help from another person do you currently need...    Turning from your back to your side while in flat bed without using bedrails? 4  -AH 4  -AH     Moving from lying on back to sitting on the side of a flat bed without bedrails? 4  -AH 4  -AH     Moving to and from a bed to a chair (including a wheelchair)? 4  -AH 4  -AH     Standing up from a chair using your arms (e.g., wheelchair, bedside chair)? 4  -AH 4  -AH     Climbing 3-5 steps with a railing? 3  -AH 3  -AH     To walk in hospital room? 4  -AH 4  -AH     AM-PAC 6 Clicks Score (PT) 23  -AH 23  -AH        Functional Assessment    Outcome Measure Options AM-PAC 6 Clicks Basic Mobility (PT)  -AH AM-PAC 6 Clicks Basic Mobility (PT)  -               User Key  (r) = Recorded By, (t) = Taken By, (c) = Cosigned By      Initials Name Provider Type     Monica Lerma PTA Physical Therapist Assistant                     Time Calculation:    PT Charges       Row Name 05/01/25 0857             Time Calculation    Start Time 0837  -      Stop Time 0855  -      Time Calculation (min) 18 min  -      PT Received On 05/01/25  -         Time Calculation- PT    Total Timed Code Minutes- PT 18 minute(s)  -         Timed Charges    48315 - Gait Training Minutes  18  -AH         Total Minutes    Timed Charges Total Minutes 18  -AH       Total Minutes 18  -AH                User Key  (r) = Recorded By, (t) = Taken By, (c) = Cosigned By      Initials Name Provider Type     Monica Lerma PTA Physical Therapist Assistant                  Therapy Charges for Today       Code Description Service Date Service Provider Modifiers Qty    [id number] HC GAIT TRAINING EA 15 MIN 5/1/2025 Monica Lerma PTA GP 1            PT G-Codes  Outcome Measure Options: AM-PAC 6 Clicks Basic Mobility (PT)  AM-PAC 6 Clicks Score (PT): 23  AM-PAC 6 Clicks Score (OT): 22    Monica Lerma  PTA  5/1/2025

## 2025-05-01 NOTE — PROGRESS NOTES
Kurtis Valladares is a 59 y.o. male patient.      Postoperative day #1 from second step with planned third and final step of surgery tomorrow.        Wound looks good.  Current Facility-Administered Medications   Medication Dose Route Frequency Provider Last Rate Last Admin    acetaminophen (TYLENOL) tablet 650 mg  650 mg Oral Q4H PRN PAWEL Iyer MD   650 mg at 05/01/25 0555    Or    acetaminophen (TYLENOL) 160 MG/5ML oral solution 650 mg  650 mg Oral Q4H PRN PAWEL Iyer MD        Or    acetaminophen (TYLENOL) suppository 650 mg  650 mg Rectal Q4H PRN PAWEL Iyer MD        albuterol sulfate HFA (PROVENTIL HFA;VENTOLIN HFA;PROAIR HFA) inhaler 2 puff  2 puff Inhalation PRN PAEWL Iyer MD        sennosides-docusate (PERICOLACE) 8.6-50 MG per tablet 2 tablet  2 tablet Oral BID PAWEL Iyer MD   2 tablet at 05/01/25 0804    And    polyethylene glycol (MIRALAX) packet 17 g  17 g Oral Daily PRN PAWEL Iyer MD   17 g at 05/01/25 0805    And    bisacodyl (DULCOLAX) EC tablet 5 mg  5 mg Oral Daily PRN PAWEL Iyer MD        And    bisacodyl (DULCOLAX) suppository 10 mg  10 mg Rectal Daily PRN PAWEL Iyer MD        budesonide-formoterol (SYMBICORT) 160-4.5 MCG/ACT inhaler 2 puff  2 puff Inhalation BID - RT PAWEL Iyer MD   2 puff at 05/01/25 0614    citalopram (CeleXA) tablet 40 mg  40 mg Oral Daily PAWEL Iyer MD   40 mg at 05/01/25 0804    fluticasone (FLONASE) 50 MCG/ACT nasal spray 2 spray  2 spray Nasal Daily PAWEL Iyer MD   2 spray at 05/01/25 0808    hydroCHLOROthiazide tablet 50 mg  50 mg Oral Daily PAWEL Iyer MD   50 mg at 04/30/25 1002    HYDROmorphone (DILAUDID) injection 0.5 mg  0.5 mg Intravenous Q3H PRN PAWEL Iyer MD        And    naloxone (NARCAN) injection 0.4 mg  0.4 mg Intravenous Q5 Min PRN PAWEL Iyer MD        magnesium oxide (MAG-OX) tablet 400 mg  400 mg Oral Daily PAWEL Iyer MD    "400 mg at 05/01/25 0804    methocarbamol (ROBAXIN) tablet 1,000 mg  1,000 mg Oral 4x Daily PRN PAWEL Iyer MD        minocycline (MINOCIN,DYNACIN) capsule 100 mg  100 mg Oral BID PAWEL Iyer MD   100 mg at 05/01/25 0805    minoxidil (LONITEN) tablet 5 mg  5 mg Oral Daily PAWEL Iyer MD   5 mg at 05/01/25 0805    ondansetron ODT (ZOFRAN-ODT) disintegrating tablet 4 mg  4 mg Oral Q6H PRN PAWEL Iyer MD        Or    ondansetron (ZOFRAN) injection 4 mg  4 mg Intravenous Q6H PRN PAWEL Iyer MD        oxyCODONE-acetaminophen (PERCOCET)  MG per tablet 1 tablet  1 tablet Oral Q4H PRN PAWEL Iyer MD   1 tablet at 05/01/25 0804    oxyCODONE-acetaminophen (PERCOCET) 7.5-325 MG per tablet 1 tablet  1 tablet Oral Q4H PRN PAWEL Iyer MD   1 tablet at 04/30/25 1812    pantoprazole (PROTONIX) EC tablet 40 mg  40 mg Oral Q AM PAWEL Iyer MD   40 mg at 05/01/25 0528    potassium chloride (KAYCIEL) 20 mEq/15 mL solution 40 mEq  40 mEq Oral Daily PAWEL Iyer MD   40 mEq at 05/01/25 0805    sodium chloride 0.9 % flush 10 mL  10 mL Intravenous PRN PAWEL Iyer MD        sodium chloride 0.9 % flush 3 mL  3 mL Intravenous Q12H PAWEL Iyer MD   3 mL at 05/01/25 0808    sodium chloride 0.9 % infusion 40 mL  40 mL Intravenous PRN PAWEL Iyer MD        tamsulosin (FLOMAX) 24 hr capsule 0.4 mg  0.4 mg Oral BID PAWEL Iyer MD   0.4 mg at 05/01/25 0805     ALLERGIES:  No Known Allergies    Lumbosacral radiculopathy    Lumbar radiculopathy    Lumbosacral disc disease    Lumbar spondylosis    BPH with obstruction/lower urinary tract symptoms    MARTI (generalized anxiety disorder)    Drug-induced constipation    Blood pressure 116/86, pulse 84, temperature 99.2 °F (37.3 °C), temperature source Oral, resp. rate 16, height 185 cm (72.84\"), weight 110 kg (242 lb 4.6 oz), SpO2 97%.      Subjective:  Symptoms:  Stable.    Diet:  NPO.    Activity " "level: Impaired due to pain.    Pain:  He complains of pain that is moderate.  He reports pain is unchanged.  Pain is partially controlled.      Review of Systems  Objective:  General Appearance:  Comfortable, well-appearing, in no acute distress and in pain.    Vital signs: (most recent): Blood pressure 116/86, pulse 84, temperature 99.2 °F (37.3 °C), temperature source Oral, resp. rate 16, height 185 cm (72.84\"), weight 110 kg (242 lb 4.6 oz), SpO2 97%.  Vital signs are normal.    Output: Producing urine and minimal stool output.    HEENT: Normal HEENT exam.    Lungs:  Normal effort and normal respiratory rate.    Heart: Normal rate.  Regular rhythm.    Abdomen: Abdomen is soft.  Hypoactive bowel sounds.   There is generalized tenderness.     Extremities: Decreased range of motion.    Neurological: Patient is alert.    Skin:  Warm and dry.                Labs:  Lab Results (last 72 hours)       Procedure Component Value Units Date/Time    Folate [[id number]]  (Normal) Collected: 04/29/25 0314    Specimen: Blood Updated: 04/29/25 1200     Folate 11.90 ng/mL     Narrative:      Results may be falsely increased if patient taking Biotin.      Vitamin B12 [[id number]]  (Normal) Collected: 04/29/25 0314    Specimen: Blood Updated: 04/29/25 1200     Vitamin B-12 713 pg/mL     Narrative:      Results may be falsely increased if patient taking Biotin.      CBC & Differential [[id number]]  (Abnormal) Collected: 04/29/25 0315    Specimen: Blood Updated: 04/29/25 0414    Narrative:      The following orders were created for panel order CBC & Differential.  Procedure                               Abnormality         Status                     ---------                               -----------         ------                     CBC Auto Differential[[id number]]        Abnormal            Final result                 Please view results for these tests on the individual orders.    CBC Auto Differential [[id number]]  (Abnormal) " Collected: 04/29/25 0315    Specimen: Blood Updated: 04/29/25 0414     WBC 6.44 10*3/mm3      RBC 3.53 10*6/mm3      Hemoglobin 10.5 g/dL      Hematocrit 31.4 %      MCV 89.0 fL      MCH 29.7 pg      MCHC 33.4 g/dL      RDW 12.8 %      RDW-SD 41.5 fl      MPV 9.1 fL      Platelets 237 10*3/mm3      Neutrophil % 71.5 %      Lymphocyte % 19.3 %      Monocyte % 8.1 %      Eosinophil % 0.5 %      Basophil % 0.3 %      Immature Grans % 0.3 %      Neutrophils, Absolute 4.61 10*3/mm3      Lymphocytes, Absolute 1.24 10*3/mm3      Monocytes, Absolute 0.52 10*3/mm3      Eosinophils, Absolute 0.03 10*3/mm3      Basophils, Absolute 0.02 10*3/mm3      Immature Grans, Absolute 0.02 10*3/mm3      nRBC 0.0 /100 WBC     Basic Metabolic Panel [[id number]]  (Abnormal) Collected: 04/29/25 0315    Specimen: Blood Updated: 04/29/25 0356     Glucose 95 mg/dL      BUN 6 mg/dL      Creatinine 0.81 mg/dL      Sodium 139 mmol/L      Potassium 3.2 mmol/L      Chloride 104 mmol/L      CO2 27.0 mmol/L      Calcium 8.2 mg/dL      BUN/Creatinine Ratio 7.4     Anion Gap 8.0 mmol/L      eGFR 102.2 mL/min/1.73     Narrative:      GFR Categories in Chronic Kidney Disease (CKD)      GFR Category          GFR (mL/min/1.73)    Interpretation  G1                     90 or greater         Normal or high (1)  G2                      60-89                Mild decrease (1)  G3a                   45-59                Mild to moderate decrease  G3b                   30-44                Moderate to severe decrease  G4                    15-29                Severe decrease  G5                    14 or less           Kidney failure          (1)In the absence of evidence of kidney disease, neither GFR category G1 or G2 fulfill the criteria for CKD.    eGFR calculation 2021 CKD-EPI creatinine equation, which does not include race as a factor    Iron Profile [[id number]]  (Abnormal) Collected: 04/29/25 0315    Specimen: Blood Updated: 04/29/25 0356     Iron 30 mcg/dL       Iron Saturation (TSAT) 15 %      Transferrin 136 mg/dL      TIBC 203 mcg/dL             Imaging Results (Last 72 Hours)       Procedure Component Value Units Date/Time    XR Spine Lumbar AP & Lateral [[id number]] Collected: 04/30/25 0818     Updated: 04/30/25 0821    Narrative:      XR SPINE LUMBAR AP AND LATERAL- 4/30/2025 6:10 AM     HISTORY: LLIF; Z74.09-Other reduced mobility     COMPARISON: None     FLUOROSCOPY TIME: 21.4 seconds     FLUOROSCOPY DOSE: 24.5 mGy     NUMBER OF IMAGES: 4       Impression:         Intraoperative fluoroscopic images during lumbar fusion.     Please refer to the operative note for more details.     This report was signed and finalized on 4/30/2025 8:18 AM by Dr. Cody Rogers MD.       FL C Arm During Surgery [[id number]] Resulted: 04/30/25 0817     Updated: 04/30/25 0817    Narrative:      This procedure was auto-finalized with no dictation required.    XR Abdomen 1 View [[id number]] Collected: 04/28/25 1248     Updated: 04/28/25 1254    Narrative:      EXAM: XR ABDOMEN 1 VW-      DATE: 4/28/2025 11:23 AM     HISTORY: no count in OR       COMPARISON: 3/1/2024.     TECHNIQUE: Single supine view of the abdomen. 1 image.     FINDINGS:    Limited evaluation for free air on supine imaging. Nonobstructive supine  bowel gas pattern with gas and stool to the level of the rectum.     Postoperative and degenerative changes of the lumbosacral spine, not  optimally assessed on this exam. Surgical clips at the LEFT pelvis.     Degenerative changes of the hips, not optimally assessed on this exam.     There is a 1 cm density projecting at the RIGHT iliac wing, which could  represent bone lesion, ingested contents, or structure external to the  patient.          Impression:      1. Postoperative and degenerative changes of the lumbosacral spine, not  optimally assessed on this exam. Surgical clips at the LEFT pelvis.     2. There is a 1 cm density projecting at the RIGHT iliac wing,  "could  represent bone lesion, ingested contents, or structure external to the  patient. Consider correlation with serum PSA and CT pelvis for further  evaluation.     Exam was tagged in PACS with \"incidental findings\" to assist in  appropriate follow up.     This report was signed and finalized on 4/28/2025 12:51 PM by Dr Kathleen Carballo MD.       XR Spine Lumbar AP & Lateral [[id number]] Collected: 04/28/25 1227     Updated: 04/28/25 1231    Narrative:      XR SPINE LUMBAR AP AND LATERAL- 4/28/2025 9:15 AM     HISTORY: alif     COMPARISON: None     FLUOROSCOPY TIME: 10.3 seconds     FLUOROSCOPY DOSE: 10.0 mGy     NUMBER OF IMAGES: 5       Impression:         Intraoperative fluoroscopic images during lumbar fusion.     Please refer to the operative note for more details.     This report was signed and finalized on 4/28/2025 12:28 PM by Dr. Cody Rogers MD.       FL C Arm During Surgery [[id number]] Resulted: 04/28/25 1223     Updated: 04/28/25 1223    Narrative:      This procedure was auto-finalized with no dictation required.                  Assessment:    Condition: In stable condition.  Improving.       Plan:   Transfer Plan: To operating room for second step.  Encourage ambulation and per physical therapy.  Regular diet.   (    Postop day #1 from second step of surgery was planned final step of surgery tomorrow.    BPH with lower urinary tract symptoms-continue with alpha-blocker for now.  Judicious use of opioid analgesics early ambulation and aggressive bowel pattern in the immediate postoperative period.    Constipation/obstipation-continue with stool softeners after second step of surgery today can stimulate bowel movement with cathartics.    MARTI-stable    Plan for third and final step of surgery tomorrow then discharge home over the weekend.    ).     Problem List:     Lumbosacral radiculopathy    Lumbar radiculopathy    Lumbosacral disc disease    Lumbar spondylosis    BPH with obstruction/lower " urinary tract symptoms    MARTI (generalized anxiety disorder)    Drug-induced constipation    Maged Quigley MD  5/1/2025

## 2025-05-01 NOTE — PROGRESS NOTES
NIMO Avila PA-C   Progress Note        Subjective/Overnight Events:  No acute issues, walking well, tolerating PO, voiding    Vitals  Vitals:    05/01/25 0614 05/01/25 0804 05/01/25 1122 05/01/25 1532   BP:  116/86 113/60 142/76   BP Location:  Left arm Left arm Left arm   Patient Position:  Sitting Sitting Standing   Pulse: 90 84 82 93   Resp: 18 16 16 16   Temp:  99.2 °F (37.3 °C) 99.1 °F (37.3 °C) 98.5 °F (36.9 °C)   TempSrc:  Oral Oral Oral   SpO2: 98% 97% 98% 98%   Weight:       Height:           Current Facility-Administered Medications   Medication Dose Route Frequency Provider Last Rate Last Admin    acetaminophen (TYLENOL) tablet 650 mg  650 mg Oral Q4H PRN PAWEL Iyer MD   650 mg at 05/01/25 0555    Or    acetaminophen (TYLENOL) 160 MG/5ML oral solution 650 mg  650 mg Oral Q4H PRN PAWEL yIer MD        Or    acetaminophen (TYLENOL) suppository 650 mg  650 mg Rectal Q4H PRN PAWEL Iyer MD        albuterol sulfate HFA (PROVENTIL HFA;VENTOLIN HFA;PROAIR HFA) inhaler 2 puff  2 puff Inhalation PRN PAWEL Iyer MD        sennosides-docusate (PERICOLACE) 8.6-50 MG per tablet 2 tablet  2 tablet Oral BID PWAEL Iyer MD   2 tablet at 05/01/25 0804    And    polyethylene glycol (MIRALAX) packet 17 g  17 g Oral Daily PRN PAWEL Iyer MD   17 g at 05/01/25 0805    And    bisacodyl (DULCOLAX) EC tablet 5 mg  5 mg Oral Daily PRN PAWEL Iyer MD        And    bisacodyl (DULCOLAX) suppository 10 mg  10 mg Rectal Daily PRN PAWEL Iyer MD        budesonide-formoterol (SYMBICORT) 160-4.5 MCG/ACT inhaler 2 puff  2 puff Inhalation BID - RT PAWEL Iyer MD   2 puff at 05/01/25 0614    [START ON 5/2/2025] ceFAZolin 2000 mg IVPB in 100 mL NS (MBP)  2,000 mg Intravenous On Call to OR PAWEL Iyer MD        citalopram (CeleXA) tablet 40 mg  40 mg Oral Daily PAWEL Iyer MD   40 mg at 05/01/25 0804    fluticasone (FLONASE)  50 MCG/ACT nasal spray 2 spray  2 spray Nasal Daily PAWEL Iyer MD   2 spray at 05/01/25 0808    hydroCHLOROthiazide tablet 50 mg  50 mg Oral Daily PAWEL Iyer MD   50 mg at 04/30/25 1002    HYDROmorphone (DILAUDID) injection 0.5 mg  0.5 mg Intravenous Q3H PRN PAWEL Iyer MD        And    naloxone (NARCAN) injection 0.4 mg  0.4 mg Intravenous Q5 Min PRN PAWEL Iyer MD        magnesium oxide (MAG-OX) tablet 400 mg  400 mg Oral Daily PAWEL Iyer MD   400 mg at 05/01/25 0804    methocarbamol (ROBAXIN) tablet 1,000 mg  1,000 mg Oral 4x Daily PRN PAWEL Iyer MD        minocycline (MINOCIN,DYNACIN) capsule 100 mg  100 mg Oral BID PAWEL Iyer MD   100 mg at 05/01/25 0805    minoxidil (LONITEN) tablet 5 mg  5 mg Oral Daily PAWEL Iyer MD   5 mg at 05/01/25 0805    ondansetron ODT (ZOFRAN-ODT) disintegrating tablet 4 mg  4 mg Oral Q6H PRN PAWEL Iyer MD        Or    ondansetron (ZOFRAN) injection 4 mg  4 mg Intravenous Q6H PRN PAWEL Iyer MD        oxyCODONE-acetaminophen (PERCOCET)  MG per tablet 1 tablet  1 tablet Oral Q4H PRN PAWEL Iyer MD   1 tablet at 05/01/25 1310    oxyCODONE-acetaminophen (PERCOCET) 7.5-325 MG per tablet 1 tablet  1 tablet Oral Q4H PRN PAWEL Iyer MD   1 tablet at 04/30/25 1812    pantoprazole (PROTONIX) EC tablet 40 mg  40 mg Oral Q AM PAWEL Iyer MD   40 mg at 05/01/25 0528    potassium chloride (KAYCIEL) 20 mEq/15 mL solution 40 mEq  40 mEq Oral Daily PAWEL Iyer MD   40 mEq at 05/01/25 0805    sodium chloride 0.9 % flush 10 mL  10 mL Intravenous PRN PAWEL Iyer MD        sodium chloride 0.9 % flush 3 mL  3 mL Intravenous Q12H PAWEL Iyer MD   3 mL at 05/01/25 0808    sodium chloride 0.9 % infusion 40 mL  40 mL Intravenous PRN PAWEL Iyer MD        tamsulosin (FLOMAX) 24 hr capsule 0.4 mg  0.4 mg Oral BID PAWEL Iyer MD   0.4 mg at 05/01/25 0805        PHYSICAL EXAM:    Orientation:  alert and oriented to person, place, and time    Incision:  no significant drainage    Upper Extremity Motor :  5/5 bilaterally    Upper Motor Neuron Signs:  none     Lower Extremity Motor :  equal bilaterally    Lower Extremity Sensory:  Tibial nerve: Intact, Superficial peroneal nerve: Intact, and Deep peroneal nerve: Intact    Flatus:  flatus    ABNORMAL EXAM FINDINGS:  none    LABS:    Lab Results (last 24 hours)       Procedure Component Value Units Date/Time    Basic Metabolic Panel [[id number]]  (Abnormal) Collected: 05/01/25 0236    Specimen: Blood Updated: 05/01/25 0408     Glucose 85 mg/dL      BUN 9 mg/dL      Creatinine 0.75 mg/dL      Sodium 136 mmol/L      Potassium 3.0 mmol/L      Chloride 102 mmol/L      CO2 28.0 mmol/L      Calcium 8.1 mg/dL      BUN/Creatinine Ratio 12.0     Anion Gap 6.0 mmol/L      eGFR 104.6 mL/min/1.73     Narrative:      GFR Categories in Chronic Kidney Disease (CKD)              GFR Category          GFR (mL/min/1.73)    Interpretation  G1                    90 or greater        Normal or high (1)  G2                    60-89                Mild decrease (1)  G3a                   45-59                Mild to moderate decrease  G3b                   30-44                Moderate to severe decrease  G4                    15-29                Severe decrease  G5                    14 or less           Kidney failure    (1)In the absence of evidence of kidney disease, neither GFR category G1 or G2 fulfill the criteria for CKD.    eGFR calculation 2021 CKD-EPI creatinine equation, which does not include race as a factor            ASSESSMENT AND PLAN:    Status post staged lumbar fusion L4-S1    1:  Activity Level:  Up with assist  2:  Pain Control:  Oral analgesia  3:  Discharge Planning:  After final stage  4:  Other:  NPO after midnight, ABX on call to OR      Electronically signed by Primo Avila PA-C 5/1/2025 15:41  CDT

## 2025-05-01 NOTE — PLAN OF CARE
Goal Outcome Evaluation:           Progress: improving     Pt A/Ox4. VSS on RA. IV to bilateral hands, IID. Up ad dinesh with LSO brace. Prevena wound vac to abd & L flank dressing CDI. NPO at midnight for surgery, consent signed. SCDs. Voiding. Safety maintained. Call light in reach.

## 2025-05-01 NOTE — PLAN OF CARE
Goal Outcome Evaluation:           Progress: no change  Outcome Evaluation: Patient c/o pain PRN meds given per orders, IID, dressing to flank C/D/I, wound vac in place to lower abdomen, tolerating diet

## 2025-05-01 NOTE — PROGRESS NOTES
Mr. Valladares is doing well.  Pain controlled.  Ambulating with physical therapy.  Anterior and lateral dressings clean dry and intact.  Plan to return to surgery for the final posterior stage of the procedure tomorrow.

## 2025-05-02 ENCOUNTER — ANESTHESIA EVENT (OUTPATIENT)
Dept: PERIOP | Facility: HOSPITAL | Age: 60
DRG: 427 | End: 2025-05-02
Payer: MEDICAID

## 2025-05-02 ENCOUNTER — ANESTHESIA (OUTPATIENT)
Dept: PERIOP | Facility: HOSPITAL | Age: 60
DRG: 427 | End: 2025-05-02
Payer: MEDICAID

## 2025-05-02 ENCOUNTER — APPOINTMENT (OUTPATIENT)
Dept: GENERAL RADIOLOGY | Facility: HOSPITAL | Age: 60
DRG: 427 | End: 2025-05-02
Payer: MEDICAID

## 2025-05-02 PROCEDURE — 86901 BLOOD TYPING SEROLOGIC RH(D): CPT | Performed by: ANESTHESIOLOGY

## 2025-05-02 PROCEDURE — 25010000002 PROPOFOL 10 MG/ML EMULSION

## 2025-05-02 PROCEDURE — 0SG3071 FUSION OF LUMBOSACRAL JOINT WITH AUTOLOGOUS TISSUE SUBSTITUTE, POSTERIOR APPROACH, POSTERIOR COLUMN, OPEN APPROACH: ICD-10-PCS | Performed by: ORTHOPAEDIC SURGERY

## 2025-05-02 PROCEDURE — 25010000002 FENTANYL CITRATE (PF) 50 MCG/ML SOLUTION

## 2025-05-02 PROCEDURE — 25810000003 LACTATED RINGERS PER 1000 ML: Performed by: ANESTHESIOLOGY

## 2025-05-02 PROCEDURE — 86900 BLOOD TYPING SEROLOGIC ABO: CPT | Performed by: ANESTHESIOLOGY

## 2025-05-02 PROCEDURE — 25010000002 ONDANSETRON PER 1 MG

## 2025-05-02 PROCEDURE — 94799 UNLISTED PULMONARY SVC/PX: CPT

## 2025-05-02 PROCEDURE — 0SG1071 FUSION OF 2 OR MORE LUMBAR VERTEBRAL JOINTS WITH AUTOLOGOUS TISSUE SUBSTITUTE, POSTERIOR APPROACH, POSTERIOR COLUMN, OPEN APPROACH: ICD-10-PCS | Performed by: ORTHOPAEDIC SURGERY

## 2025-05-02 PROCEDURE — 25010000002 DEXAMETHASONE PER 1 MG: Performed by: ANESTHESIOLOGY

## 2025-05-02 PROCEDURE — C1713 ANCHOR/SCREW BN/BN,TIS/BN: HCPCS | Performed by: ORTHOPAEDIC SURGERY

## 2025-05-02 PROCEDURE — 86850 RBC ANTIBODY SCREEN: CPT | Performed by: ANESTHESIOLOGY

## 2025-05-02 PROCEDURE — C1769 GUIDE WIRE: HCPCS | Performed by: ORTHOPAEDIC SURGERY

## 2025-05-02 PROCEDURE — 97164 PT RE-EVAL EST PLAN CARE: CPT | Performed by: PHYSICAL THERAPIST

## 2025-05-02 PROCEDURE — 76000 FLUOROSCOPY <1 HR PHYS/QHP: CPT

## 2025-05-02 PROCEDURE — 25010000002 HYDROMORPHONE PER 4 MG: Performed by: ANESTHESIOLOGY

## 2025-05-02 PROCEDURE — 25010000002 FENTANYL CITRATE (PF) 50 MCG/ML SOLUTION: Performed by: ANESTHESIOLOGY

## 2025-05-02 PROCEDURE — 25010000002 DEXAMETHASONE PER 1 MG

## 2025-05-02 PROCEDURE — 25010000002 CEFAZOLIN PER 500 MG: Performed by: ORTHOPAEDIC SURGERY

## 2025-05-02 PROCEDURE — 72100 X-RAY EXAM L-S SPINE 2/3 VWS: CPT

## 2025-05-02 PROCEDURE — 25810000003 LACTATED RINGERS PER 1000 ML: Performed by: ORTHOPAEDIC SURGERY

## 2025-05-02 PROCEDURE — 25010000002 HYDROMORPHONE PER 4 MG: Performed by: ORTHOPAEDIC SURGERY

## 2025-05-02 PROCEDURE — 25010000002 MIDAZOLAM PER 1MG: Performed by: ANESTHESIOLOGY

## 2025-05-02 PROCEDURE — 25010000002 SUGAMMADEX 200 MG/2ML SOLUTION

## 2025-05-02 PROCEDURE — 4A11X4G MONITORING OF PERIPHERAL NERVOUS ELECTRICAL ACTIVITY, INTRAOPERATIVE, EXTERNAL APPROACH: ICD-10-PCS | Performed by: ORTHOPAEDIC SURGERY

## 2025-05-02 PROCEDURE — 25010000002 LIDOCAINE PF 2% 2 % SOLUTION

## 2025-05-02 PROCEDURE — 25010000002 BUPIVACAINE LIPOSOME 1.3 % SUSPENSION 10 ML VIAL: Performed by: ORTHOPAEDIC SURGERY

## 2025-05-02 PROCEDURE — 4A10X4Z MONITORING OF CENTRAL NERVOUS ELECTRICAL ACTIVITY, EXTERNAL APPROACH: ICD-10-PCS | Performed by: ORTHOPAEDIC SURGERY

## 2025-05-02 DEVICE — SET SCREW
Type: IMPLANTABLE DEVICE | Site: SPINE LUMBAR | Status: FUNCTIONAL
Brand: INVICTUS

## 2025-05-02 DEVICE — TI, MIS LORDOTIC ROD, VI2, 5.5MM X 80MM
Type: IMPLANTABLE DEVICE | Site: SPINE LUMBAR | Status: FUNCTIONAL
Brand: INVICTUS

## 2025-05-02 RX ORDER — LABETALOL HYDROCHLORIDE 5 MG/ML
5 INJECTION, SOLUTION INTRAVENOUS
Status: DISCONTINUED | OUTPATIENT
Start: 2025-05-02 | End: 2025-05-02 | Stop reason: HOSPADM

## 2025-05-02 RX ORDER — LIDOCAINE HYDROCHLORIDE 20 MG/ML
INJECTION, SOLUTION EPIDURAL; INFILTRATION; INTRACAUDAL; PERINEURAL AS NEEDED
Status: DISCONTINUED | OUTPATIENT
Start: 2025-05-02 | End: 2025-05-02 | Stop reason: SURG

## 2025-05-02 RX ORDER — FENTANYL CITRATE 50 UG/ML
50 INJECTION, SOLUTION INTRAMUSCULAR; INTRAVENOUS
Status: COMPLETED | OUTPATIENT
Start: 2025-05-02 | End: 2025-05-02

## 2025-05-02 RX ORDER — FLUMAZENIL 0.1 MG/ML
0.2 INJECTION INTRAVENOUS AS NEEDED
Status: DISCONTINUED | OUTPATIENT
Start: 2025-05-02 | End: 2025-05-02 | Stop reason: HOSPADM

## 2025-05-02 RX ORDER — SODIUM CHLORIDE 0.9 % (FLUSH) 0.9 %
3 SYRINGE (ML) INJECTION EVERY 12 HOURS SCHEDULED
Status: DISCONTINUED | OUTPATIENT
Start: 2025-05-02 | End: 2025-05-02 | Stop reason: HOSPADM

## 2025-05-02 RX ORDER — OXYCODONE HCL 20 MG/1
20 TABLET, FILM COATED, EXTENDED RELEASE ORAL ONCE
Status: COMPLETED | OUTPATIENT
Start: 2025-05-02 | End: 2025-05-02

## 2025-05-02 RX ORDER — SODIUM CHLORIDE 0.9 % (FLUSH) 0.9 %
3-10 SYRINGE (ML) INJECTION AS NEEDED
Status: DISCONTINUED | OUTPATIENT
Start: 2025-05-02 | End: 2025-05-02 | Stop reason: HOSPADM

## 2025-05-02 RX ORDER — EPHEDRINE SULFATE 50 MG/ML
INJECTION, SOLUTION INTRAVENOUS AS NEEDED
Status: DISCONTINUED | OUTPATIENT
Start: 2025-05-02 | End: 2025-05-02 | Stop reason: SURG

## 2025-05-02 RX ORDER — TRANEXAMIC ACID 100 MG/ML
INJECTION, SOLUTION INTRAVENOUS AS NEEDED
Status: DISCONTINUED | OUTPATIENT
Start: 2025-05-02 | End: 2025-05-02 | Stop reason: SURG

## 2025-05-02 RX ORDER — SODIUM CHLORIDE, SODIUM LACTATE, POTASSIUM CHLORIDE, CALCIUM CHLORIDE 600; 310; 30; 20 MG/100ML; MG/100ML; MG/100ML; MG/100ML
1000 INJECTION, SOLUTION INTRAVENOUS CONTINUOUS
Status: DISCONTINUED | OUTPATIENT
Start: 2025-05-02 | End: 2025-05-02 | Stop reason: HOSPADM

## 2025-05-02 RX ORDER — MIDAZOLAM HYDROCHLORIDE 2 MG/2ML
2 INJECTION, SOLUTION INTRAMUSCULAR; INTRAVENOUS
Status: DISCONTINUED | OUTPATIENT
Start: 2025-05-02 | End: 2025-05-02 | Stop reason: HOSPADM

## 2025-05-02 RX ORDER — FENTANYL CITRATE 50 UG/ML
INJECTION, SOLUTION INTRAMUSCULAR; INTRAVENOUS AS NEEDED
Status: DISCONTINUED | OUTPATIENT
Start: 2025-05-02 | End: 2025-05-02 | Stop reason: SURG

## 2025-05-02 RX ORDER — NALOXONE HCL 0.4 MG/ML
0.04 VIAL (ML) INJECTION AS NEEDED
Status: DISCONTINUED | OUTPATIENT
Start: 2025-05-02 | End: 2025-05-02 | Stop reason: HOSPADM

## 2025-05-02 RX ORDER — IBUPROFEN 600 MG/1
600 TABLET, FILM COATED ORAL EVERY 6 HOURS PRN
Status: DISCONTINUED | OUTPATIENT
Start: 2025-05-02 | End: 2025-05-02 | Stop reason: HOSPADM

## 2025-05-02 RX ORDER — SODIUM CHLORIDE 9 MG/ML
40 INJECTION, SOLUTION INTRAVENOUS AS NEEDED
Status: DISCONTINUED | OUTPATIENT
Start: 2025-05-02 | End: 2025-05-02 | Stop reason: HOSPADM

## 2025-05-02 RX ORDER — ONDANSETRON 2 MG/ML
INJECTION INTRAMUSCULAR; INTRAVENOUS AS NEEDED
Status: DISCONTINUED | OUTPATIENT
Start: 2025-05-02 | End: 2025-05-02 | Stop reason: SURG

## 2025-05-02 RX ORDER — ROCURONIUM BROMIDE 10 MG/ML
INJECTION, SOLUTION INTRAVENOUS AS NEEDED
Status: DISCONTINUED | OUTPATIENT
Start: 2025-05-02 | End: 2025-05-02 | Stop reason: SURG

## 2025-05-02 RX ORDER — PROPOFOL 10 MG/ML
VIAL (ML) INTRAVENOUS AS NEEDED
Status: DISCONTINUED | OUTPATIENT
Start: 2025-05-02 | End: 2025-05-02 | Stop reason: SURG

## 2025-05-02 RX ORDER — MAGNESIUM HYDROXIDE 1200 MG/15ML
LIQUID ORAL AS NEEDED
Status: DISCONTINUED | OUTPATIENT
Start: 2025-05-02 | End: 2025-05-02 | Stop reason: HOSPADM

## 2025-05-02 RX ORDER — ACETAMINOPHEN 500 MG
1000 TABLET ORAL ONCE
Status: COMPLETED | OUTPATIENT
Start: 2025-05-02 | End: 2025-05-02

## 2025-05-02 RX ORDER — SODIUM CHLORIDE 0.9 % (FLUSH) 0.9 %
10 SYRINGE (ML) INJECTION AS NEEDED
Status: DISCONTINUED | OUTPATIENT
Start: 2025-05-02 | End: 2025-05-02 | Stop reason: HOSPADM

## 2025-05-02 RX ORDER — DEXAMETHASONE SODIUM PHOSPHATE 4 MG/ML
INJECTION, SOLUTION INTRA-ARTICULAR; INTRALESIONAL; INTRAMUSCULAR; INTRAVENOUS; SOFT TISSUE AS NEEDED
Status: DISCONTINUED | OUTPATIENT
Start: 2025-05-02 | End: 2025-05-02 | Stop reason: SURG

## 2025-05-02 RX ORDER — DEXAMETHASONE SODIUM PHOSPHATE 4 MG/ML
4 INJECTION, SOLUTION INTRA-ARTICULAR; INTRALESIONAL; INTRAMUSCULAR; INTRAVENOUS; SOFT TISSUE ONCE AS NEEDED
Status: COMPLETED | OUTPATIENT
Start: 2025-05-02 | End: 2025-05-02

## 2025-05-02 RX ORDER — SODIUM CHLORIDE, SODIUM LACTATE, POTASSIUM CHLORIDE, CALCIUM CHLORIDE 600; 310; 30; 20 MG/100ML; MG/100ML; MG/100ML; MG/100ML
100 INJECTION, SOLUTION INTRAVENOUS CONTINUOUS
Status: DISPENSED | OUTPATIENT
Start: 2025-05-02 | End: 2025-05-03

## 2025-05-02 RX ORDER — ONDANSETRON 2 MG/ML
4 INJECTION INTRAMUSCULAR; INTRAVENOUS
Status: DISCONTINUED | OUTPATIENT
Start: 2025-05-02 | End: 2025-05-02 | Stop reason: HOSPADM

## 2025-05-02 RX ORDER — OXYCODONE AND ACETAMINOPHEN 10; 325 MG/1; MG/1
1 TABLET ORAL EVERY 4 HOURS PRN
Status: DISCONTINUED | OUTPATIENT
Start: 2025-05-02 | End: 2025-05-02 | Stop reason: HOSPADM

## 2025-05-02 RX ORDER — HYDROMORPHONE HYDROCHLORIDE 1 MG/ML
0.5 INJECTION, SOLUTION INTRAMUSCULAR; INTRAVENOUS; SUBCUTANEOUS
Status: DISCONTINUED | OUTPATIENT
Start: 2025-05-02 | End: 2025-05-02 | Stop reason: HOSPADM

## 2025-05-02 RX ADMIN — CEFAZOLIN 2000 MG: 2 INJECTION, POWDER, FOR SOLUTION INTRAMUSCULAR; INTRAVENOUS at 18:20

## 2025-05-02 RX ADMIN — BUDESONIDE AND FORMOTEROL FUMARATE DIHYDRATE 2 PUFF: 160; 4.5 AEROSOL RESPIRATORY (INHALATION) at 06:13

## 2025-05-02 RX ADMIN — ACETAMINOPHEN 1000 MG: 500 TABLET, FILM COATED ORAL at 07:51

## 2025-05-02 RX ADMIN — HYDROMORPHONE HYDROCHLORIDE 0.5 MG: 1 INJECTION, SOLUTION INTRAMUSCULAR; INTRAVENOUS; SUBCUTANEOUS at 12:10

## 2025-05-02 RX ADMIN — EPHEDRINE SULFATE 10 MG: 50 INJECTION, SOLUTION INTRAVENOUS at 10:51

## 2025-05-02 RX ADMIN — EPHEDRINE SULFATE 5 MG: 50 INJECTION, SOLUTION INTRAVENOUS at 10:42

## 2025-05-02 RX ADMIN — LIDOCAINE HYDROCHLORIDE 100 MG: 20 INJECTION, SOLUTION EPIDURAL; INFILTRATION; INTRACAUDAL; PERINEURAL at 09:23

## 2025-05-02 RX ADMIN — FENTANYL CITRATE 100 MCG: 50 INJECTION, SOLUTION INTRAMUSCULAR; INTRAVENOUS at 09:23

## 2025-05-02 RX ADMIN — BISACODYL 5 MG: 5 TABLET, COATED ORAL at 18:21

## 2025-05-02 RX ADMIN — OXYCODONE AND ACETAMINOPHEN 1 TABLET: 325; 10 TABLET ORAL at 18:20

## 2025-05-02 RX ADMIN — CEFAZOLIN 2000 MG: 2 INJECTION, POWDER, FOR SOLUTION INTRAMUSCULAR; INTRAVENOUS at 09:30

## 2025-05-02 RX ADMIN — HYDROMORPHONE HYDROCHLORIDE 0.5 MG: 1 INJECTION, SOLUTION INTRAMUSCULAR; INTRAVENOUS; SUBCUTANEOUS at 01:38

## 2025-05-02 RX ADMIN — DEXAMETHASONE SODIUM PHOSPHATE 4 MG: 4 INJECTION, SOLUTION INTRA-ARTICULAR; INTRALESIONAL; INTRAMUSCULAR; INTRAVENOUS; SOFT TISSUE at 11:12

## 2025-05-02 RX ADMIN — TRANEXAMIC ACID 1000 MG: 100 INJECTION, SOLUTION INTRAVENOUS at 11:10

## 2025-05-02 RX ADMIN — MINOCYCLINE HYDROCHLORIDE 100 MG: 50 CAPSULE ORAL at 19:47

## 2025-05-02 RX ADMIN — PANTOPRAZOLE SODIUM 40 MG: 40 TABLET, DELAYED RELEASE ORAL at 18:20

## 2025-05-02 RX ADMIN — SODIUM CHLORIDE, POTASSIUM CHLORIDE, SODIUM LACTATE AND CALCIUM CHLORIDE 1000 ML: 600; 310; 30; 20 INJECTION, SOLUTION INTRAVENOUS at 07:56

## 2025-05-02 RX ADMIN — ROCURONIUM BROMIDE 30 MG: 10 INJECTION, SOLUTION INTRAVENOUS at 10:05

## 2025-05-02 RX ADMIN — HYDROMORPHONE HYDROCHLORIDE 0.5 MG: 1 INJECTION, SOLUTION INTRAMUSCULAR; INTRAVENOUS; SUBCUTANEOUS at 05:08

## 2025-05-02 RX ADMIN — FENTANYL CITRATE 100 MCG: 50 INJECTION, SOLUTION INTRAMUSCULAR; INTRAVENOUS at 10:05

## 2025-05-02 RX ADMIN — ROCURONIUM BROMIDE 50 MG: 10 INJECTION, SOLUTION INTRAVENOUS at 09:23

## 2025-05-02 RX ADMIN — BUDESONIDE AND FORMOTEROL FUMARATE DIHYDRATE 2 PUFF: 160; 4.5 AEROSOL RESPIRATORY (INHALATION) at 18:44

## 2025-05-02 RX ADMIN — OXYCODONE AND ACETAMINOPHEN 1 TABLET: 325; 10 TABLET ORAL at 12:27

## 2025-05-02 RX ADMIN — OXYCODONE HYDROCHLORIDE 20 MG: 20 TABLET, FILM COATED, EXTENDED RELEASE ORAL at 07:51

## 2025-05-02 RX ADMIN — ONDANSETRON 4 MG: 2 INJECTION INTRAMUSCULAR; INTRAVENOUS at 11:12

## 2025-05-02 RX ADMIN — MIDAZOLAM HYDROCHLORIDE 2 MG: 1 INJECTION, SOLUTION INTRAMUSCULAR; INTRAVENOUS at 07:51

## 2025-05-02 RX ADMIN — SUGAMMADEX 200 MG: 100 INJECTION, SOLUTION INTRAVENOUS at 10:42

## 2025-05-02 RX ADMIN — METHOCARBAMOL TABLETS 1000 MG: 500 TABLET, COATED ORAL at 13:18

## 2025-05-02 RX ADMIN — DEXAMETHASONE SODIUM PHOSPHATE 4 MG: 4 INJECTION, SOLUTION INTRA-ARTICULAR; INTRALESIONAL; INTRAMUSCULAR; INTRAVENOUS; SOFT TISSUE at 07:51

## 2025-05-02 RX ADMIN — FENTANYL CITRATE 50 MCG: 50 INJECTION, SOLUTION INTRAMUSCULAR; INTRAVENOUS at 10:42

## 2025-05-02 RX ADMIN — METHOCARBAMOL TABLETS 1000 MG: 500 TABLET, COATED ORAL at 19:47

## 2025-05-02 RX ADMIN — TAMSULOSIN HYDROCHLORIDE 0.4 MG: 0.4 CAPSULE ORAL at 19:47

## 2025-05-02 RX ADMIN — FENTANYL CITRATE 50 MCG: 50 INJECTION, SOLUTION INTRAMUSCULAR; INTRAVENOUS at 12:00

## 2025-05-02 RX ADMIN — SODIUM CHLORIDE, POTASSIUM CHLORIDE, SODIUM LACTATE AND CALCIUM CHLORIDE 100 ML/HR: 600; 310; 30; 20 INJECTION, SOLUTION INTRAVENOUS at 07:56

## 2025-05-02 RX ADMIN — HYDROMORPHONE HYDROCHLORIDE 0.5 MG: 1 INJECTION, SOLUTION INTRAMUSCULAR; INTRAVENOUS; SUBCUTANEOUS at 11:59

## 2025-05-02 RX ADMIN — FENTANYL CITRATE 50 MCG: 50 INJECTION, SOLUTION INTRAMUSCULAR; INTRAVENOUS at 12:10

## 2025-05-02 RX ADMIN — Medication 3 ML: at 19:57

## 2025-05-02 RX ADMIN — OXYCODONE AND ACETAMINOPHEN 1 TABLET: 325; 10 TABLET ORAL at 22:31

## 2025-05-02 RX ADMIN — SENNOSIDES, DOCUSATE SODIUM 2 TABLET: 50; 8.6 TABLET, FILM COATED ORAL at 19:47

## 2025-05-02 RX ADMIN — TRANEXAMIC ACID 1000 MG: 100 INJECTION, SOLUTION INTRAVENOUS at 10:00

## 2025-05-02 RX ADMIN — PROPOFOL 200 MG: 10 INJECTION, EMULSION INTRAVENOUS at 09:23

## 2025-05-02 RX ADMIN — EPHEDRINE SULFATE 5 MG: 50 INJECTION, SOLUTION INTRAVENOUS at 10:21

## 2025-05-02 NOTE — ANESTHESIA PROCEDURE NOTES
Airway  Date/Time: 5/2/2025 9:25 AM  Airway not difficult    General Information and Staff    Patient location during procedure: OR  CRNA/CAA: Rose Mary Packer CRNA    Indications and Patient Condition  Indications for airway management: airway protection    Preoxygenated: yes    Mask difficulty assessment: 0 - not attempted    Final Airway Details    Final airway type: endotracheal airway      Successful airway: ETT  Cuffed: yes   Successful intubation technique: video laryngoscopy  Adjuncts used in placement: intubating stylet  Endotracheal tube insertion site: oral  Blade: Keys  Blade size: 4  ETT size (mm): 7.5  Cormack-Lehane Classification: grade I - full view of glottis  Placement verified by: chest auscultation and capnometry   Cuff volume (mL): 7  Measured from: teeth  ETT/EBT  to teeth (cm): 21  Number of attempts at approach: 1  Assessment: lips, teeth, and gum same as pre-op and atraumatic intubation

## 2025-05-02 NOTE — NURSING NOTE
Instructed on NPO after midnight, voices understanding. Request not to be awakened during the night.  
2 seconds or less

## 2025-05-02 NOTE — OP NOTE
LUMBAR LAMINECTOMY POSTERIOR LUMBAR INTERBODY FUSION  Procedure Note    Kurtis Ross Blaine  5/2/2025    Pre-op Diagnosis:     1. Status post hemilaminectomy, decompression, partial facetectomy decompression, bilateral L3-4, left L5-S1, 11/13/2023    2. Status post irrigation and debridement posterior lumbar wound, 12/13/2023    3. Status post revision irrigation and debridement posterior lumbar wound, 02/16/2024   4.  Residual chronic low back pain, left worse than right  5.  Residual left buttock, thigh, and leg radiculopathy  6.  Residual right lateral and anterior thigh radiculopathy  7.  Residual neurogenic claudication  8.  Degenerative disc disease L3-S1  9.  Facet arthropathy L2-S1  10.  Degenerative scoliosis, concave right T12-L4, concave left L5-S1  11.  Congenital short pedicle syndrome  12.  Probable autofusion L4-5  13.  Severe foraminal stenosis right L3-4, left L5-S1  14.  Status post anterior decompression, ALIF with instrumentation L5-S1, 4/28/2025  15.  Status post left LLIF with instrumentation L3-4, 4/30/2025    Post-op Diagnosis:    same    Procedure/CPT® Codes:     1.  Posterior spinal fusion L3-S1  2.  Posterior spinal instrumentation L3-S1 (ATEC pedicle screws and rods)  3.  Use of locally obtained autograft bone for fusion  4.  Use of fluoroscopy for confirmation of surgical level and placement of instrumentation  5.  Intraoperative neuromonitoring with pedicle screw stimulation    Spinal Surgery Levels Completed:3 Levels      Anesthesia: General     Surgeon: FILIBERTO Iyer MD     Assistant: Huang Avila PA-C     Estimated Blood Loss: minimal     Complications: None     Condition: Stable to PACU.     Indications:     The patient is a 59-year-old who sees Shira Worthington NP for medical issues.  He originally presented to the office with chronic low back pain along with mainly left leg radiculopathy and symptoms consistent with neurogenic claudication.  While we briefly discussed a  decompression and fusion procedure, we elected to proceed with a smaller decompression procedure alone at L3-4 and L5-S1.  This procedure was performed on 11/13/2023.  Unfortunately, the surgery was complicated by a draining infected wound that required two irrigation and debridement procedures.  He presented back to the office with residual chronic low back pain as well as residual leg radiculopathy and symptoms consistent with neurogenic claudication.  Repeat imaging studies revealed degenerative disc disease from L3-S1 with facet arthropathy from L2-S1 and congenital short pedicles syndrome.  He was also noted to have a degenerative scoliosis concave to the right from T12-L4 and concave to the left at L5-S1.  He was likely to have a autofusion at L4-5.  These findings created severe foraminal stenosis on the right side of L3-4 on the left side of L5-S1.       After failing conservative measures, it was mutually decided that surgery would be the best option. Risks, benefits, and complications of surgery were discussed with the patient. The patient appeared well informed and wished to proceed. We specifically discussed the risk of infection, blood loss, nerve root injury, CSF leak, and the possibility of incomplete resolution of symptoms. We also discussed the possible risk of a nonunion and the potential need for additional surgery in the event of a pseudoarthrosis or hardware failure.      We elected to proceed with a staged operation.  The first stage of the procedure was performed on 4/28/2025 and involved an anterior decompression and ALIF with instrumentation of L5-S1.  The second stage of the procedure was performed on 4/30/2025 and involved a lateral fusion of L3-4.  Today we return to surgery for the final posterior stage of the procedure involving a posterior spinal fusion with instrumentation from L3 to S1.     Operative Procedure:     After obtaining informed consent and verifying the correct operative  site, the patient was brought to the operating room. A general anesthetic was provided by the anesthesia service with the assistance of an endotracheal tube. Once this was appropriately positioned and secured, the patient was carefully rotated prone onto a Jose frame. All bony processes were well-padded. The lumbar region was prepped and draped in usual sterile fashion. A surgical time out was taken to confirm this was the correct patient, we were working at the correct levels, and that preoperative antibiotics were given in a timely fashion.      Using fluoroscopy for guidance, a right-sided Nereida incision was created overlying L3 to S1 using a 10 blade scalpel.  Dissection was carried through subcutaneous tissues using Bovie cautery.  The lumbar fascia was divided in line with the incision and blunt dissection was carried between the multifidus and the longissimus down to the facet joints of L3-4, L4-5 and L5-S1.  Dissection was carried laterally exposing the transverse processes of L3, L4, L5, and the sacral ala.  We then exposed the facet joints further.  The capsules were destroyed using Bovie cautery exposing as much bone as possible.  The high-speed bur was then used to decorticate the facet joints, destroying as much of the facet cartilage as possible.  I also decorticated the lateral pars region and the transverse processes.  The locally obtained autograft bone was left in situ in the posterolateral gutter.  This constituted a posterior fusion of L3-4, L4-5 and L5-S1.      Next under fluoroscopic guidance, Jamshidi needles were used to cannulate the pedicles of L3, L4, and S1.  Once the needles were properly positioned in the pedicles, guidewires were placed to maintain position.  Over each of the guidewires, an [facility] pedicle screw was placed.  We used 6.5 mm x 50 mm screws into each of the pedicles.  I then used a neuromonitoring probe to stimulate the screws themselves confirming appropriate position  ensuring no breach of the pedicles.  After confirming the screws were properly positioned, an appropriate-sized casper was chosen to span the pedicle screws.  The casper was tightened into position using set screws.  The set screws were tightened using the appropriate antitorque wrench and torque-limiting screwdriver provided by [facility].     Next again under fluoroscopic guidance, I created stab incisions over the pedicles on the left side at L3 and S1.  Through these incisions, I used the Jamshidi needles to cannulate the pedicles.  Once properly positioned, I placed guidewires to maintain position.  I then again placed 6.5 mm x 50 mm screws into these pedicles.  I then used the neuromonitoring probe to stimulate the screws again ensuring that they were properly positioned.  While both of the screws did test normally, the left S1 screw appeared slightly more medial than planned.  I removed the screw and replaced it with a new screw with a slightly more lateral trajectory.  The screw also was tested and felt to be in good position.  A second casper was chosen and passed under the musculature.  This was held into position using set screws tightened in the same fashion using the antitorque wrench and torque-limiting screwdriver again provided by [facility].      The wounds were then thoroughly irrigated and an inspection was then undertaken to ensure that we had adequate hemostasis.  Bleeding at this point was controlled using thrombin with Gelfoam powder and bipolar cautery.  Final fluoroscopy imaging confirmed adequate position of the newly placed posterior instrumentation spanning L3 to S1.      Wound closure was then accomplished by reapproximating the fascia with a #1 Vicryl, area immediate to subcutaneous tissues were closed using a 2-0 Vicryl and skin closure was augmented using Mastisol and Steri-Strips.  The incisions were then washed and sterilely dressed with Bioclusive dressings.      The patient was then carefully rotated  supine onto a hospital gurney, extubated, and sent to the recovery room in good stable condition.  The patient tolerated the procedure well.  There were no complications.  We estimated blood loss to be minimal.    Intraoperative neuromonitoring was ordered and carried out throughout the procedure to add an increased level of safety for the patient.  The interpreting physician was available by means of real-time continuous, bidirectional, remote audio and visual communication as needed throughout the entire procedure.  Modalities used during the procedure included SSEP, EEG, EMG, TOF, and pedicle screw stimulation.  There were no neuromonitoring signal changes during the procedure.    Huang Avila PA-C provided critical assistance during the procedure.  His assistance was medically necessary in order to allow the procedure to occur in the most safe and efficient manner.    FILIBERTO Iyer MD     Date: 5/2/2025  Time: 11:27 CDT

## 2025-05-02 NOTE — THERAPY DISCHARGE NOTE
Patient Name: Kurtis Valladares  : 1965    MRN: [id number]                              Today's Date: 2025       Admit Date: 2025    Visit Dx:     ICD-10-CM ICD-9-CM   1. Impaired mobility [Z74.09]  Z74.09 799.89     Patient Active Problem List   Diagnosis    Right knee pain    Primary osteoarthritis of right knee    Effusion of right knee    Pain of right heel    Postcalcaneal bursitis of right foot    Pre-op evaluation    Abnormal nuclear stress test    Preop cardiovascular exam    Lumbar radiculopathy    Hypokalemia    Foraminal stenosis of lumbar region, severe    Bradycardia    Lumbosacral disc disease    Lumbar spondylosis    Lumbosacral radiculopathy    BPH with obstruction/lower urinary tract symptoms    MARTI (generalized anxiety disorder)    Drug-induced constipation     Past Medical History:   Diagnosis Date    Acute bronchitis     not since     Arthritis     Asthma     Dyspnea     Enlarged prostate     GERD (gastroesophageal reflux disease)     Hypertension     Pain in lower limb     Sleep apnea     history of, pt states he thinks it has resolved    Sprain of ankle     Wound dehiscence, surgical 2024    lumbar wound     Past Surgical History:   Procedure Laterality Date    ANTERIOR LUMBAR EXPOSURE N/A 2025    Procedure: ANTERIOR LUMBAR EXPOSURE;  Surgeon: Montana Rangel DO;  Location:  [facility] OR;  Service: Vascular;  Laterality: N/A;    CARDIAC CATHETERIZATION N/A 2020    Procedure: Left Heart Cath/PCI if indicated;  Surgeon: Hawa Dominguez MD;  Location:  [facility] CATH IN[facility]ASIVE [facility];  Service: Cardiology    GASTRIC SLEEVE LAPAROSCOPIC      INCISION AND DRAINAGE POSTERIOR SPINE N/A 2023    Procedure: IRRIGATION AND DEBRIDEMENT POSTERIOR LUMBAR WOUND;  Surgeon: PAWEL Iyer MD;  Location:  [facility] OR;  Service: Orthopedic Spine;  Laterality: N/A;    INCISION AND DRAINAGE POSTERIOR SPINE N/A 2024    Procedure: IRRIGATION AND DEBRIDEMENT  LUMBAR WOUND DEHISCENCE;  Surgeon: PAWEL Iyer MD;  Location:  [facility] OR;  Service: Orthopedic Spine;  Laterality: N/A;    LUMBAR FUSION N/A 4/28/2025    Procedure: AMTERIOR DECOMPRESSION, ANTERIOR LUMBAR INTERBODY FUSION WITH INSTRUMENTATION L5-S1;  Surgeon: PAWEL Iyer MD;  Location:  [facility] OR;  Service: Orthopedic Spine;  Laterality: N/A;    LUMBAR LAMINECTOMY WITH FUSION N/A 11/13/2023    Procedure: HEMILAMINECTOMY, DECOMPRESSION, PARTIAL FACETECTOMY, FORAMINOTOMY BILATERAL L3-4, LEFT L5-S1;  Surgeon: PAWEL Iyer MD;  Location:  [facility] OR;  Service: Orthopedic Spine;  Laterality: N/A;      General Information       Row Name 05/02/25 1400          Physical Therapy Time and Intention    Document Type re-evaluation  s/p PSF L3-S1  -MS     Mode of Treatment physical therapy;individual therapy  -MS       Row Name 05/02/25 1400          General Information    Patient Profile Reviewed yes  -MS     Prior Level of Function independent:;all household mobility;ADL's  -MS     Existing Precautions/Restrictions LSO;spinal;brace worn when out of bed  -MS     Barriers to Rehab none identified  -MS       Row Name 05/02/25 1400          Living Environment    Current Living Arrangements home  -MS     People in Home spouse  -MS       Row Name 05/02/25 1400          Home Main Entrance    Number of Stairs, Main Entrance two  -MS     Stair Railings, Main Entrance none  -MS       Row Name 05/02/25 1400          Stairs Within Home, Primary    Number of Stairs, Within Home, Primary none  -MS       Row Name 05/02/25 1400          Cognition    Orientation Status (Cognition) oriented x 4  -MS       Row Name 05/02/25 1400          Safety Issues/Impairments Affecting Functional Mobility    Impairments Affecting Function (Mobility) pain  -MS               User Key  (r) = Recorded By, (t) = Taken By, (c) = Cosigned By      Initials Name Provider Type    Ramona Leyva, PT, DPT, NCS Physical Therapist                    Mobility       Row Name 05/02/25 1400          Bed Mobility    Comment, (Bed Mobility) pt up in restroom  -MS       Row Name 05/02/25 1400          Sit-Stand Transfer    Sit-Stand [place] (Transfers) independent  -MS       Row Name 05/02/25 1400          Gait/Stairs (Locomotion)    [place] Level (Gait) independent  -MS     Patient was able to Ambulate yes  -MS     Distance in Feet (Gait) 600  -MS               User Key  (r) = Recorded By, (t) = Taken By, (c) = Cosigned By      Initials Name Provider Type    Ramona Leyva, PT, DPT, NCS Physical Therapist                   Obj/Interventions       Row Name 05/02/25 1400          Range of Motion Comprehensive    General Range of Motion bilateral lower extremity ROM WFL  -MS       Row Name 05/02/25 1400          Strength Comprehensive (MMT)    General Manual Muscle Testing (MMT) Assessment no strength deficits identified  -MS       Row Name 05/02/25 1400          Balance    Balance Assessment sitting static balance;sitting dynamic balance;standing static balance;standing dynamic balance  -MS     Static Sitting Balance independent  -MS     Dynamic Sitting Balance independent  -MS     Position, Sitting Balance unsupported;sitting edge of bed  -MS     Static Standing Balance independent  -MS     Dynamic Standing Balance independent  -MS       Row Name 05/02/25 1400          Sensory Assessment (Somatosensory)    Sensory Assessment (Somatosensory) sensation intact  -MS               User Key  (r) = Recorded By, (t) = Taken By, (c) = Cosigned By      Initials Name Provider Type    Ramona Leyva, PT, DPT, NCS Physical Therapist                   Goals/Plan       Row Name 05/02/25 1400          Bed Mobility Goal 1 (PT)    Activity/Assistive Device (Bed Mobility Goal 1, PT) bed mobility activities, all  -MS     [place] Level/Cues Needed (Bed Mobility Goal 1, PT) independent  -MS     Progress/Outcomes (Bed Mobility Goal 1, PT) goal met  -MS       Row  Name 05/02/25 1400          Transfer Goal 1 (PT)    Activity/Assistive Device (Transfer Goal 1, PT) sit-to-stand/stand-to-sit;bed-to-chair/chair-to-bed  -MS     [place] Level/Cues Needed (Transfer Goal 1, PT) independent  -MS     Progress/Outcome (Transfer Goal 1, PT) goal met  -MS       Row Name 05/02/25 1400          Gait Training Goal 1 (PT)    Activity/Assistive Device (Gait Training Goal 1, PT) gait (walking locomotion);decrease fall risk;improve balance and speed;increase endurance/gait distance  -MS     [place] Level (Gait Training Goal 1, PT) independent  -MS     Distance (Gait Training Goal 1, PT) 1000ft  -MS     Progress/Outcome (Gait Training Goal 1, PT) goal not met  -MS       Row Name 05/02/25 1400          Problem Specific Goal 1 (PT)    Problem Specific Goal 1 (PT) The patient will independently implement 1 pain management technique to decrease pain in order to improve functional mobility.  -MS     Progress/Outcome (Problem Specific Goal 1, PT) goal met  -MS               User Key  (r) = Recorded By, (t) = Taken By, (c) = Cosigned By      Initials Name Provider Type    MS Cody Ramona R, PT, DPT, NCS Physical Therapist                   Clinical Impression       Row Name 05/02/25 1400          Pain    Pretreatment Pain Rating 2/10  -MS     Posttreatment Pain Rating 2/10  -MS     Pain Location back  -MS     Pain Management Interventions activity modification encouraged;exercise or physical activity utilized  -MS     Response to Pain Interventions no change per patient report  -MS       Row Name 05/02/25 1400          Plan of Care Review    Plan of Care Reviewed With patient  -MS     Progress improving  -MS     Outcome Evaluation The patient presents alert and oriented x4 up in the restroom independently with LSO in place. The patient demonstrates no focal neurological deficits in strength, sensation, or coordination. He is independent in all functional mobility and he is aware of his spinal  restrictions and use of his LSO. He ambulated the entire length of the hallway safely. He has no further needs for PT. PT to sign off.  -MS       Row Name 05/02/25 1400          Therapy Assessment/Plan (PT)    Criteria for Skilled Interventions Met (PT) no problems identified which require skilled intervention;does not meet criteria for skilled intervention  -MS     Therapy Frequency (PT) evaluation only  -MS       Row Name 05/02/25 1400          Positioning and Restraints    Post Treatment Position chair  -MS     In Chair sitting;call light within reach;encouraged to call for assist;with family/caregiver;with brace  -MS               User Key  (r) = Recorded By, (t) = Taken By, (c) = Cosigned By      Initials Name Provider Type    Ramona Leyva, PT, DPT, NCS Physical Therapist                   Outcome Measures       Row Name 05/02/25 1400 05/02/25 0720       How much help from another person do you currently need...    Turning from your back to your side while in flat bed without using bedrails? 4  -MS 4  -JW    Moving from lying on back to sitting on the side of a flat bed without bedrails? 4  -MS 4  -JW    Moving to and from a bed to a chair (including a wheelchair)? 4  -MS 4  -JW    Standing up from a chair using your arms (e.g., wheelchair, bedside chair)? 4  -MS 4  -JW    Climbing 3-5 steps with a railing? 4  -MS 3  -JW    To walk in hospital room? 4  -MS 4  -JW    AM-PAC 6 Clicks Score (PT) 24  -MS 23  -JW    Highest Level of Mobility Goal 8 --> Walked 250 feet or more  -MS 7 --> Walk 25 feet or more  -JW      Row Name 05/02/25 1400          Functional Assessment    Outcome Measure Options AM-PAC 6 Clicks Basic Mobility (PT)  -MS               User Key  (r) = Recorded By, (t) = Taken By, (c) = Cosigned By      Initials Name Provider Type    Ramona Leyva, PT, DPT, NCS Physical Therapist    Shelby Norton LPN Licensed Nurse                  Physical Therapy Education       Title: PT OT SLP  Therapies (In Progress)       Topic: Physical Therapy (In Progress)       Point: Mobility training (In Progress)       Learning Progress Summary            Patient Acceptance, E,TB, NR by MS at 4/30/2025 1526    Comment: role of PT in his care, spinal restrictions, use of LSO                      Point: Home exercise program (Not Started)       Learner Progress:  Not documented in this visit.              Point: Body mechanics (Not Started)       Learner Progress:  Not documented in this visit.              Point: Precautions (In Progress)       Learning Progress Summary            Patient Acceptance, E,TB, NR by MS at 4/30/2025 1526    Comment: role of PT in his care, spinal restrictions, use of LSO                                      User Key       Initials Effective Dates Name Provider Type Discipline    MS 07/11/23 -  Ramona Ross, PT, DPT, NCS Physical Therapist PT                  PT Recommendation and Plan  Planned Therapy Interventions (PT): balance training, bed mobility training, gait training, patient/family education, transfer training, strengthening, orthotic fitting/training  Progress: improving  Outcome Evaluation: The patient presents alert and oriented x4 up in the restroom independently with LSO in place. The patient demonstrates no focal neurological deficits in strength, sensation, or coordination. He is independent in all functional mobility and he is aware of his spinal restrictions and use of his LSO. He ambulated the entire length of the hallway safely. He has no further needs for PT. PT to sign off.     Time Calculation:         PT Charges       Row Name 05/02/25 1400             Time Calculation    Start Time 1400  -MS      Stop Time 1426  -MS      Time Calculation (min) 26 min  -MS      PT Received On 05/02/25  -MS         Untimed Charges    PT Eval/Re-eval Minutes 26  -MS         Total Minutes    Untimed Charges Total Minutes 26  -MS       Total Minutes 26  -MS                User  Key  (r) = Recorded By, (t) = Taken By, (c) = Cosigned By      Initials Name Provider Type    MS Ramona Ross, PT, DPT, NCS Physical Therapist                  Therapy Charges for Today       Code Description Service Date Service Provider Modifiers Qty    [id number] HC PT RE-EVAL ESTABLISHED PLAN 2 5/2/2025 Ramona Ross PT, DPT, NCS GP 1            PT G-Codes  Outcome Measure Options: AM-PAC 6 Clicks Basic Mobility (PT)  AM-PAC 6 Clicks Score (PT): 24  AM-PAC 6 Clicks Score (OT): 22    PT Discharge Summary  Anticipated Discharge Disposition (PT): home with assist  Reason for Discharge: Independent  Outcomes Achieved: Refer to plan of care for updates on goals achieved  Discharge Destination: Home with assist    Ramona Ross, PT, DPT, NCS  5/2/2025

## 2025-05-02 NOTE — PLAN OF CARE
Goal Outcome Evaluation:      Alert and oriented x4. Up ad dinesh with LSO. Prevena wound vac in place. Has been NPO since midnight. VSS. Safety maintained.

## 2025-05-02 NOTE — PLAN OF CARE
Goal Outcome Evaluation:  Plan of Care Reviewed With: patient        Progress: improving  Outcome Evaluation: The patient presents alert and oriented x4 up in the restroom independently with LSO in place. The patient demonstrates no focal neurological deficits in strength, sensation, or coordination. He is independent in all functional mobility and he is aware of his spinal restrictions and use of his LSO. He ambulated the entire length of the hallway safely. He has no further needs for PT. PT to sign off.    Anticipated Discharge Disposition (PT): home with assist

## 2025-05-02 NOTE — PROGRESS NOTES
Kurtis Valladares is a 59 y.o. male patient.      Postoperative day #1 from second step with planned third and final step of surgery today.  Medically stable for surgical intervention today with plans of discharge home over the weekend.        Wound looks good.      Current Facility-Administered Medications   Medication Dose Route Frequency Provider Last Rate Last Admin    acetaminophen (TYLENOL) tablet 650 mg  650 mg Oral Q4H PRN PAWEL Iyer MD   650 mg at 05/01/25 0555    Or    acetaminophen (TYLENOL) 160 MG/5ML oral solution 650 mg  650 mg Oral Q4H PRN PAWEL Iyer MD        Or    acetaminophen (TYLENOL) suppository 650 mg  650 mg Rectal Q4H PRN PAWEL Iyer MD        albuterol sulfate HFA (PROVENTIL HFA;VENTOLIN HFA;PROAIR HFA) inhaler 2 puff  2 puff Inhalation PRN PAWEL Iyer MD        sennosides-docusate (PERICOLACE) 8.6-50 MG per tablet 2 tablet  2 tablet Oral BID PAWEL Iyer MD   2 tablet at 05/01/25 2021    And    polyethylene glycol (MIRALAX) packet 17 g  17 g Oral Daily PRN PAWEL Iyer MD   17 g at 05/01/25 0805    And    bisacodyl (DULCOLAX) EC tablet 5 mg  5 mg Oral Daily PRN PAWEL Iyer MD        And    bisacodyl (DULCOLAX) suppository 10 mg  10 mg Rectal Daily PRN PAWEL Iyer MD        budesonide-formoterol (SYMBICORT) 160-4.5 MCG/ACT inhaler 2 puff  2 puff Inhalation BID - RT PAWEL Iyer MD   2 puff at 05/01/25 1825    ceFAZolin 2000 mg IVPB in 100 mL NS (MBP)  2,000 mg Intravenous On Call to OR PAWEL Iyer MD        citalopram (CeleXA) tablet 40 mg  40 mg Oral Daily PAWEL Iyer MD   40 mg at 05/01/25 0804    fluticasone (FLONASE) 50 MCG/ACT nasal spray 2 spray  2 spray Nasal Daily PAWEL Iyer MD   2 spray at 05/01/25 0808    hydroCHLOROthiazide tablet 50 mg  50 mg Oral Daily PAWEL Iyer MD   50 mg at 04/30/25 1002    HYDROmorphone (DILAUDID) injection 0.5 mg  0.5 mg Intravenous Q3H PRN PAWEL Iyer  MD Segun   0.5 mg at 05/02/25 0138    And    naloxone (NARCAN) injection 0.4 mg  0.4 mg Intravenous Q5 Min PRN PAWEL Iyer MD        magnesium oxide (MAG-OX) tablet 400 mg  400 mg Oral Daily PAWEL Iyer MD   400 mg at 05/01/25 0804    methocarbamol (ROBAXIN) tablet 1,000 mg  1,000 mg Oral 4x Daily PRN PAWEL Iyer MD        minocycline (MINOCIN,DYNACIN) capsule 100 mg  100 mg Oral BID PAWEL Iyer MD   100 mg at 05/01/25 2021    minoxidil (LONITEN) tablet 5 mg  5 mg Oral Daily PAWEL Iyer MD   5 mg at 05/01/25 0805    ondansetron ODT (ZOFRAN-ODT) disintegrating tablet 4 mg  4 mg Oral Q6H PRN PAWEL Iyer MD        Or    ondansetron (ZOFRAN) injection 4 mg  4 mg Intravenous Q6H PRN PAWEL Iyer MD        oxyCODONE-acetaminophen (PERCOCET)  MG per tablet 1 tablet  1 tablet Oral Q4H PRN PAWEL Iyer MD   1 tablet at 05/01/25 1310    oxyCODONE-acetaminophen (PERCOCET) 7.5-325 MG per tablet 1 tablet  1 tablet Oral Q4H PRN PAWEL Iyer MD   1 tablet at 05/01/25 1915    pantoprazole (PROTONIX) EC tablet 40 mg  40 mg Oral Q AM PAWEL Iyer MD   40 mg at 05/01/25 0528    potassium chloride (KAYCIEL) 20 mEq/15 mL solution 40 mEq  40 mEq Oral Daily PAWEL Iyer MD   40 mEq at 05/01/25 0805    sodium chloride 0.9 % flush 10 mL  10 mL Intravenous PRN PAWEL Iyer MD        sodium chloride 0.9 % flush 3 mL  3 mL Intravenous Q12H PAWEL yIer MD   3 mL at 05/01/25 2022    sodium chloride 0.9 % infusion 40 mL  40 mL Intravenous PRN PAWEL Iyer MD        tamsulosin (FLOMAX) 24 hr capsule 0.4 mg  0.4 mg Oral BID PAWEL Iyer MD   0.4 mg at 05/01/25 2021     ALLERGIES:  No Known Allergies    Lumbosacral radiculopathy    Lumbar radiculopathy    Lumbosacral disc disease    Lumbar spondylosis    BPH with obstruction/lower urinary tract symptoms    MARTI (generalized anxiety disorder)    Drug-induced constipation    Blood  "pressure 112/72, pulse 99, temperature 99 °F (37.2 °C), temperature source Oral, resp. rate 18, height 185 cm (72.84\"), weight 110 kg (242 lb 4.6 oz), SpO2 99%.      Subjective:  Symptoms:  Stable.    Diet:  NPO.    Activity level: Impaired due to pain.    Pain:  He complains of pain that is moderate.  He reports pain is unchanged.  Pain is partially controlled.      Review of Systems  Objective:  General Appearance:  Comfortable, well-appearing, in no acute distress and in pain.    Vital signs: (most recent): Blood pressure 112/72, pulse 99, temperature 99 °F (37.2 °C), temperature source Oral, resp. rate 18, height 185 cm (72.84\"), weight 110 kg (242 lb 4.6 oz), SpO2 99%.  Vital signs are normal.    Output: Producing urine and minimal stool output.    HEENT: Normal HEENT exam.    Lungs:  Normal effort and normal respiratory rate.    Heart: Normal rate.  Regular rhythm.    Abdomen: Abdomen is soft.  Hypoactive bowel sounds.   There is generalized tenderness.     Extremities: Decreased range of motion.    Neurological: Patient is alert.    Skin:  Warm and dry.                Labs:  Lab Results (last 72 hours)       Procedure Component Value Units Date/Time    Folate [[id number]]  (Normal) Collected: 04/29/25 0314    Specimen: Blood Updated: 04/29/25 1200     Folate 11.90 ng/mL     Narrative:      Results may be falsely increased if patient taking Biotin.      Vitamin B12 [[id number]]  (Normal) Collected: 04/29/25 0314    Specimen: Blood Updated: 04/29/25 1200     Vitamin B-12 713 pg/mL     Narrative:      Results may be falsely increased if patient taking Biotin.      CBC & Differential [[id number]]  (Abnormal) Collected: 04/29/25 0315    Specimen: Blood Updated: 04/29/25 0414    Narrative:      The following orders were created for panel order CBC & Differential.  Procedure                               Abnormality         Status                     ---------                               -----------         ------    "                  CBC Auto Differential[[id number]]        Abnormal            Final result                 Please view results for these tests on the individual orders.    CBC Auto Differential [[id number]]  (Abnormal) Collected: 04/29/25 0315    Specimen: Blood Updated: 04/29/25 0414     WBC 6.44 10*3/mm3      RBC 3.53 10*6/mm3      Hemoglobin 10.5 g/dL      Hematocrit 31.4 %      MCV 89.0 fL      MCH 29.7 pg      MCHC 33.4 g/dL      RDW 12.8 %      RDW-SD 41.5 fl      MPV 9.1 fL      Platelets 237 10*3/mm3      Neutrophil % 71.5 %      Lymphocyte % 19.3 %      Monocyte % 8.1 %      Eosinophil % 0.5 %      Basophil % 0.3 %      Immature Grans % 0.3 %      Neutrophils, Absolute 4.61 10*3/mm3      Lymphocytes, Absolute 1.24 10*3/mm3      Monocytes, Absolute 0.52 10*3/mm3      Eosinophils, Absolute 0.03 10*3/mm3      Basophils, Absolute 0.02 10*3/mm3      Immature Grans, Absolute 0.02 10*3/mm3      nRBC 0.0 /100 WBC     Basic Metabolic Panel [[id number]]  (Abnormal) Collected: 04/29/25 0315    Specimen: Blood Updated: 04/29/25 0356     Glucose 95 mg/dL      BUN 6 mg/dL      Creatinine 0.81 mg/dL      Sodium 139 mmol/L      Potassium 3.2 mmol/L      Chloride 104 mmol/L      CO2 27.0 mmol/L      Calcium 8.2 mg/dL      BUN/Creatinine Ratio 7.4     Anion Gap 8.0 mmol/L      eGFR 102.2 mL/min/1.73     Narrative:      GFR Categories in Chronic Kidney Disease (CKD)      GFR Category          GFR (mL/min/1.73)    Interpretation  G1                     90 or greater         Normal or high (1)  G2                      60-89                Mild decrease (1)  G3a                   45-59                Mild to moderate decrease  G3b                   30-44                Moderate to severe decrease  G4                    15-29                Severe decrease  G5                    14 or less           Kidney failure          (1)In the absence of evidence of kidney disease, neither GFR category G1 or G2 fulfill the criteria for  CKD.    eGFR calculation 2021 CKD-EPI creatinine equation, which does not include race as a factor    Iron Profile [[id number]]  (Abnormal) Collected: 04/29/25 0315    Specimen: Blood Updated: 04/29/25 0356     Iron 30 mcg/dL      Iron Saturation (TSAT) 15 %      Transferrin 136 mg/dL      TIBC 203 mcg/dL             Imaging Results (Last 72 Hours)       Procedure Component Value Units Date/Time    XR Spine Lumbar AP & Lateral [[id number]] Collected: 04/30/25 0818     Updated: 04/30/25 0821    Narrative:      XR SPINE LUMBAR AP AND LATERAL- 4/30/2025 6:10 AM     HISTORY: LLIF; Z74.09-Other reduced mobility     COMPARISON: None     FLUOROSCOPY TIME: 21.4 seconds     FLUOROSCOPY DOSE: 24.5 mGy     NUMBER OF IMAGES: 4       Impression:         Intraoperative fluoroscopic images during lumbar fusion.     Please refer to the operative note for more details.     This report was signed and finalized on 4/30/2025 8:18 AM by Dr. Cody Rogers MD.       FL C Arm During Surgery [[id number]] Resulted: 04/30/25 0817     Updated: 04/30/25 0817    Narrative:      This procedure was auto-finalized with no dictation required.                  Assessment:    Condition: In stable condition.  Improving.       Plan:   Transfer Plan: To operating room for third step.  Encourage ambulation and per physical therapy.  NPO.   (    Plan for third and final step of surgery today with discharge home over the weekend.    BPH with lower urinary tract symptoms-continue with alpha-blocker for now.  Judicious use of opioid analgesics early ambulation and aggressive bowel pattern in the immediate postoperative period.    Constipation/obstipation-continue with stool softeners after second step of surgery today can stimulate bowel movement with cathartics.    MARTI-stable    Plan for third and final step of surgery today then discharge home over the weekend.    Explained to patient and staff that we were consulted for medical management during their  acute care hospitalization. The Medical Consultation was requested by the Attending Physician. The patient has been recommended to f/u with their regular primary care provider concerning any further treatment and review of abnormalities found during their hospitalization at [facility]. They agree with the treatment plan as well as understand our role in their hospitalization. All questions were encouraged and answered to the best of my ability. ).     Problem List:     Lumbosacral radiculopathy    Lumbar radiculopathy    Lumbosacral disc disease    Lumbar spondylosis    BPH with obstruction/lower urinary tract symptoms    MARTI (generalized anxiety disorder)    Drug-induced constipation    Maged Quigley MD  5/2/2025

## 2025-05-02 NOTE — PAYOR COMM NOTE
"Viviane Valladares (59 y.o. Male)  [id number]  Cont clinical   Please review for Additional days    [facility]   mimi phone    fax          Date of Birth   1965    Social Security Number       Address   [address] [place] [id number]    Home Phone   [phone]    MRN   [id number]       Latter-day   [employer]    Marital Status                               Admission Date   4/28/2025    Admission Type   Elective    Admitting Provider   PAWEL Iyer MD    Attending Provider   PAWEL Iyer MD    Department, Room/Bed   [facility] OR, PAD OR/MAIN OR       Discharge Date       Discharge Disposition       Discharge Destination                                 Attending Provider: PAWEL Iyer MD    Allergies: No Known Allergies    Isolation: None   Infection: None   Code Status: CPR    Ht: 185 cm (72.84\")   Wt: 110 kg (242 lb 4.6 oz)    Admission Cmt: None   Principal Problem: Lumbosacral radiculopathy [M54.17]                   Active Insurance as of 4/28/2025       Primary Coverage       Payor Plan Insurance Group Employer/Plan Group    [facility] COMMUNITY PLAN [facility] COMMUNITY PLAN [facility]       Payor Plan Address Payor Plan Phone Number Payor Plan Fax Number Effective Dates    [address]   1/1/2025 - None Entered    [facility] [id number]         Subscriber Name Subscriber Birth Date Member ID       VIVIANE VALLADARES 1965 [id number]                     Emergency Contacts        (Rel.) Home Phone Work Phone Mobile Phone    Candi Valladares (Spouse) -- -- [phone]    Kourtney Valladares (Daughter) -- -- [phone]              Vital Signs (last day)       Date/Time Temp Temp src Pulse Resp BP Patient Position SpO2    05/02/25 0613 -- -- 81 18 -- -- 98    05/02/25 0445 98.6 (37) Oral 75 18 132/75 Lying 97    05/01/25 2015 99 (37.2) Oral 99 18 112/72 Lying 99    05/01/25 1828 -- -- 97 18 -- " Sitting 99    05/01/25 1825 -- -- 96 18 -- Sitting 98    05/01/25 1532 98.5 (36.9) Oral 93 16 142/76 Standing 98    05/01/25 1122 99.1 (37.3) Oral 82 16 113/60 Sitting 98    05/01/25 0804 99.2 (37.3) Oral 84 16 116/86 Sitting 97    05/01/25 0614 -- -- 90 18 -- -- 98    05/01/25 0530 -- -- -- -- -- -- 95    05/01/25 0525 -- -- -- -- -- -- 98    05/01/25 0317 98 (36.7) Oral 70 18 112/42 Lying 98          Current Facility-Administered Medications   Medication Dose Route Frequency Provider Last Rate Last Admin    [Transfer Hold] acetaminophen (TYLENOL) tablet 650 mg  650 mg Oral Q4H PRN PAWEL Iyer MD   650 mg at 05/01/25 0555    Or    [Transfer Hold] acetaminophen (TYLENOL) 160 MG/5ML oral solution 650 mg  650 mg Oral Q4H PRN PAWEL Iyer MD        Or    [Transfer Hold] acetaminophen (TYLENOL) suppository 650 mg  650 mg Rectal Q4H PRN PAWEL Iyer MD        [Transfer Hold] albuterol sulfate HFA (PROVENTIL HFA;VENTOLIN HFA;PROAIR HFA) inhaler 2 puff  2 puff Inhalation PRN PAWEL Iyer MD        [Transfer Hold] sennosides-docusate (PERICOLACE) 8.6-50 MG per tablet 2 tablet  2 tablet Oral BID PAWEL Iyer MD   2 tablet at 05/01/25 2021    And    [Transfer Hold] polyethylene glycol (MIRALAX) packet 17 g  17 g Oral Daily PRN PAWEL Iyer MD   17 g at 05/01/25 0805    And    [Transfer Hold] bisacodyl (DULCOLAX) EC tablet 5 mg  5 mg Oral Daily PRN PAWEL Iyer MD        And    [Transfer Hold] bisacodyl (DULCOLAX) suppository 10 mg  10 mg Rectal Daily PRN PAWEL Iyer MD        [Transfer Hold] budesonide-formoterol (SYMBICORT) 160-4.5 MCG/ACT inhaler 2 puff  2 puff Inhalation BID - RT PAWEL Iyer MD   2 puff at 05/02/25 0613    ceFAZolin 2000 mg IVPB in 100 mL NS (MBP)  2,000 mg Intravenous On Call to OR PAWEL Iyer MD        [Transfer Hold] citalopram (CeleXA) tablet 40 mg  40 mg Oral Daily PAWEL Iyer MD   40 mg at 05/01/25 0804    [Transfer  Hold] fluticasone (FLONASE) 50 MCG/ACT nasal spray 2 spray  2 spray Nasal Daily PAWEL Iyer MD   2 spray at 05/01/25 0808    [Transfer Hold] hydroCHLOROthiazide tablet 50 mg  50 mg Oral Daily PAWEL Iyer MD   50 mg at 04/30/25 1002    [Transfer Hold] HYDROmorphone (DILAUDID) injection 0.5 mg  0.5 mg Intravenous Q3H PRN PAWEL Iyer MD   0.5 mg at 05/02/25 0508    And    [Transfer Hold] naloxone (NARCAN) injection 0.4 mg  0.4 mg Intravenous Q5 Min PRN PAWEL Iyer MD        lactated ringers infusion 1,000 mL  1,000 mL Intravenous Continuous PAWEL Iyer MD 25 mL/hr at 05/02/25 0756 1,000 mL at 05/02/25 0756    lactated ringers infusion  100 mL/hr Intravenous Continuous Milagros Foster  mL/hr at 05/02/25 0920 Currently Infusing at 05/02/25 0920    [Transfer Hold] magnesium oxide (MAG-OX) tablet 400 mg  400 mg Oral Daily PAWLE Iyer MD   400 mg at 05/01/25 0804    [Transfer Hold] methocarbamol (ROBAXIN) tablet 1,000 mg  1,000 mg Oral 4x Daily PRN PAWEL Iyer MD        Midazolam HCl (PF) (VERSED) injection 2 mg  2 mg Intravenous Q10 Min PRN Milagros Foster MD   2 mg at 05/02/25 0751    minocycline (MINOCIN,DYNACIN) capsule 100 mg  100 mg Oral BID PAWEL Iyer MD   100 mg at 05/01/25 2021    minoxidil (LONITEN) tablet 5 mg  5 mg Oral Daily PAWEL Iyer MD   5 mg at 05/01/25 0805    [Transfer Hold] ondansetron ODT (ZOFRAN-ODT) disintegrating tablet 4 mg  4 mg Oral Q6H PRN PAWEL Iyer MD        Or    [Transfer Hold] ondansetron (ZOFRAN) injection 4 mg  4 mg Intravenous Q6H PRN PAWEL Iyer MD        [Transfer Hold] oxyCODONE-acetaminophen (PERCOCET)  MG per tablet 1 tablet  1 tablet Oral Q4H PRN PAWEL Iyer MD   1 tablet at 05/01/25 1310    [Transfer Hold] oxyCODONE-acetaminophen (PERCOCET) 7.5-325 MG per tablet 1 tablet  1 tablet Oral Q4H PRN PAWEL Iyer MD   1 tablet at 05/01/25 1915    [Transfer  Hold] pantoprazole (PROTONIX) EC tablet 40 mg  40 mg Oral Q AM PAWEL Iyer MD   40 mg at 05/01/25 0528    potassium chloride (KAYCIEL) 20 mEq/15 mL solution 40 mEq  40 mEq Oral Daily PAWEL Iyer MD   40 mEq at 05/01/25 0805    [Transfer Hold] sodium chloride 0.9 % flush 10 mL  10 mL Intravenous PRN PAWEL Iyer MD        sodium chloride 0.9 % flush 10 mL  10 mL Intravenous PRN PAWEL Iyer MD        [Transfer Hold] sodium chloride 0.9 % flush 3 mL  3 mL Intravenous Q12H PAWEL Iyer MD   3 mL at 05/01/25 2022    sodium chloride 0.9 % flush 3 mL  3 mL Intravenous Q12H Milagros Foster MD        sodium chloride 0.9 % flush 3-10 mL  3-10 mL Intravenous PRN Milagros Foster MD        [Transfer Hold] sodium chloride 0.9 % infusion 40 mL  40 mL Intravenous PRN PAWEL Iyer MD        sodium chloride 0.9 % infusion 40 mL  40 mL Intravenous PRN Milagros Foster MD        [Transfer Hold] tamsulosin (FLOMAX) 24 hr capsule 0.4 mg  0.4 mg Oral BID PAWEL Iyer MD   0.4 mg at 05/01/25 2021        Physician Progress Notes (last 48 hours)        Maged Quigley MD at 05/02/25 0442          Kurtis Valladares is a 59 y.o. male patient.      Postoperative day #1 from second step with planned third and final step of surgery today.  Medically stable for surgical intervention today with plans of discharge home over the weekend.        Wound looks good.      Current Facility-Administered Medications   Medication Dose Route Frequency Provider Last Rate Last Admin    acetaminophen (TYLENOL) tablet 650 mg  650 mg Oral Q4H PRN PAWEL Iyer MD   650 mg at 05/01/25 0555    Or    acetaminophen (TYLENOL) 160 MG/5ML oral solution 650 mg  650 mg Oral Q4H PRN PAWEL Iyer MD        Or    acetaminophen (TYLENOL) suppository 650 mg  650 mg Rectal Q4H PRN PAWEL Iyer MD        albuterol sulfate HFA (PROVENTIL HFA;VENTOLIN HFA;PROAIR HFA) inhaler 2  puff  2 puff Inhalation PRN PAWEL Iyer MD        sennosides-docusate (PERICOLACE) 8.6-50 MG per tablet 2 tablet  2 tablet Oral BID PAWEL Iyer MD   2 tablet at 05/01/25 2021    And    polyethylene glycol (MIRALAX) packet 17 g  17 g Oral Daily PRN PAWEL Iyer MD   17 g at 05/01/25 0805    And    bisacodyl (DULCOLAX) EC tablet 5 mg  5 mg Oral Daily PRN PAWEL Iyer MD        And    bisacodyl (DULCOLAX) suppository 10 mg  10 mg Rectal Daily PRN PAWEL Iyer MD        budesonide-formoterol (SYMBICORT) 160-4.5 MCG/ACT inhaler 2 puff  2 puff Inhalation BID - RT PAWEL Iyer MD   2 puff at 05/01/25 1825    ceFAZolin 2000 mg IVPB in 100 mL NS (MBP)  2,000 mg Intravenous On Call to OR PAWEL Iyer MD        citalopram (CeleXA) tablet 40 mg  40 mg Oral Daily PAWEL Iyer MD   40 mg at 05/01/25 0804    fluticasone (FLONASE) 50 MCG/ACT nasal spray 2 spray  2 spray Nasal Daily PAWEL Iyer MD   2 spray at 05/01/25 0808    hydroCHLOROthiazide tablet 50 mg  50 mg Oral Daily PAWEL Iyer MD   50 mg at 04/30/25 1002    HYDROmorphone (DILAUDID) injection 0.5 mg  0.5 mg Intravenous Q3H PRN PAWEL Iyer MD   0.5 mg at 05/02/25 0138    And    naloxone (NARCAN) injection 0.4 mg  0.4 mg Intravenous Q5 Min PRN PAWEL Iyer MD        magnesium oxide (MAG-OX) tablet 400 mg  400 mg Oral Daily PAWEL Iyer MD   400 mg at 05/01/25 0804    methocarbamol (ROBAXIN) tablet 1,000 mg  1,000 mg Oral 4x Daily PRN PAWEL Iyer MD        minocycline (MINOCIN,DYNACIN) capsule 100 mg  100 mg Oral BID PAWEL Iyer MD   100 mg at 05/01/25 2021    minoxidil (LONITEN) tablet 5 mg  5 mg Oral Daily PAWEL Iyer MD   5 mg at 05/01/25 0805    ondansetron ODT (ZOFRAN-ODT) disintegrating tablet 4 mg  4 mg Oral Q6H PRN PAWEL Iyer MD        Or    ondansetron (ZOFRAN) injection 4 mg  4 mg Intravenous Q6H PRN PAWEL Iyer MD         "oxyCODONE-acetaminophen (PERCOCET)  MG per tablet 1 tablet  1 tablet Oral Q4H PRN PAWEL Iyer MD   1 tablet at 05/01/25 1310    oxyCODONE-acetaminophen (PERCOCET) 7.5-325 MG per tablet 1 tablet  1 tablet Oral Q4H PRN PAWEL Iyer MD   1 tablet at 05/01/25 1915    pantoprazole (PROTONIX) EC tablet 40 mg  40 mg Oral Q AM PAWEL Iyer MD   40 mg at 05/01/25 0528    potassium chloride (KAYCIEL) 20 mEq/15 mL solution 40 mEq  40 mEq Oral Daily PAWEL Iyer MD   40 mEq at 05/01/25 0805    sodium chloride 0.9 % flush 10 mL  10 mL Intravenous PRN PAWEL Iyer MD        sodium chloride 0.9 % flush 3 mL  3 mL Intravenous Q12H PAWEL Iyer MD   3 mL at 05/01/25 2022    sodium chloride 0.9 % infusion 40 mL  40 mL Intravenous PRN PAWEL Iyer MD        tamsulosin (FLOMAX) 24 hr capsule 0.4 mg  0.4 mg Oral BID PAWEL Iyer MD   0.4 mg at 05/01/25 2021     ALLERGIES:  No Known Allergies    Lumbosacral radiculopathy    Lumbar radiculopathy    Lumbosacral disc disease    Lumbar spondylosis    BPH with obstruction/lower urinary tract symptoms    MARTI (generalized anxiety disorder)    Drug-induced constipation    Blood pressure 112/72, pulse 99, temperature 99 °F (37.2 °C), temperature source Oral, resp. rate 18, height 185 cm (72.84\"), weight 110 kg (242 lb 4.6 oz), SpO2 99%.      Subjective:  Symptoms:  Stable.    Diet:  NPO.    Activity level: Impaired due to pain.    Pain:  He complains of pain that is moderate.  He reports pain is unchanged.  Pain is partially controlled.      Review of Systems  Objective:  General Appearance:  Comfortable, well-appearing, in no acute distress and in pain.    Vital signs: (most recent): Blood pressure 112/72, pulse 99, temperature 99 °F (37.2 °C), temperature source Oral, resp. rate 18, height 185 cm (72.84\"), weight 110 kg (242 lb 4.6 oz), SpO2 99%.  Vital signs are normal.    Output: Producing urine and minimal stool output.    HEENT: " Normal HEENT exam.    Lungs:  Normal effort and normal respiratory rate.    Heart: Normal rate.  Regular rhythm.    Abdomen: Abdomen is soft.  Hypoactive bowel sounds.   There is generalized tenderness.     Extremities: Decreased range of motion.    Neurological: Patient is alert.    Skin:  Warm and dry.                Labs:  Lab Results (last 72 hours)       Procedure Component Value Units Date/Time    Folate [[id number]]  (Normal) Collected: 04/29/25 0314    Specimen: Blood Updated: 04/29/25 1200     Folate 11.90 ng/mL     Narrative:      Results may be falsely increased if patient taking Biotin.      Vitamin B12 [[id number]]  (Normal) Collected: 04/29/25 0314    Specimen: Blood Updated: 04/29/25 1200     Vitamin B-12 713 pg/mL     Narrative:      Results may be falsely increased if patient taking Biotin.      CBC & Differential [[id number]]  (Abnormal) Collected: 04/29/25 0315    Specimen: Blood Updated: 04/29/25 0414    Narrative:      The following orders were created for panel order CBC & Differential.  Procedure                               Abnormality         Status                     ---------                               -----------         ------                     CBC Auto Differential[[id number]]        Abnormal            Final result                 Please view results for these tests on the individual orders.    CBC Auto Differential [[id number]]  (Abnormal) Collected: 04/29/25 0315    Specimen: Blood Updated: 04/29/25 0414     WBC 6.44 10*3/mm3      RBC 3.53 10*6/mm3      Hemoglobin 10.5 g/dL      Hematocrit 31.4 %      MCV 89.0 fL      MCH 29.7 pg      MCHC 33.4 g/dL      RDW 12.8 %      RDW-SD 41.5 fl      MPV 9.1 fL      Platelets 237 10*3/mm3      Neutrophil % 71.5 %      Lymphocyte % 19.3 %      Monocyte % 8.1 %      Eosinophil % 0.5 %      Basophil % 0.3 %      Immature Grans % 0.3 %      Neutrophils, Absolute 4.61 10*3/mm3      Lymphocytes, Absolute 1.24 10*3/mm3      Monocytes, Absolute 0.52  10*3/mm3      Eosinophils, Absolute 0.03 10*3/mm3      Basophils, Absolute 0.02 10*3/mm3      Immature Grans, Absolute 0.02 10*3/mm3      nRBC 0.0 /100 WBC     Basic Metabolic Panel [[id number]]  (Abnormal) Collected: 04/29/25 0315    Specimen: Blood Updated: 04/29/25 0356     Glucose 95 mg/dL      BUN 6 mg/dL      Creatinine 0.81 mg/dL      Sodium 139 mmol/L      Potassium 3.2 mmol/L      Chloride 104 mmol/L      CO2 27.0 mmol/L      Calcium 8.2 mg/dL      BUN/Creatinine Ratio 7.4     Anion Gap 8.0 mmol/L      eGFR 102.2 mL/min/1.73     Narrative:      GFR Categories in Chronic Kidney Disease (CKD)      GFR Category          GFR (mL/min/1.73)    Interpretation  G1                     90 or greater         Normal or high (1)  G2                      60-89                Mild decrease (1)  G3a                   45-59                Mild to moderate decrease  G3b                   30-44                Moderate to severe decrease  G4                    15-29                Severe decrease  G5                    14 or less           Kidney failure          (1)In the absence of evidence of kidney disease, neither GFR category G1 or G2 fulfill the criteria for CKD.    eGFR calculation 2021 CKD-EPI creatinine equation, which does not include race as a factor    Iron Profile [[id number]]  (Abnormal) Collected: 04/29/25 0315    Specimen: Blood Updated: 04/29/25 0356     Iron 30 mcg/dL      Iron Saturation (TSAT) 15 %      Transferrin 136 mg/dL      TIBC 203 mcg/dL             Imaging Results (Last 72 Hours)       Procedure Component Value Units Date/Time    XR Spine Lumbar AP & Lateral [[id number]] Collected: 04/30/25 0818     Updated: 04/30/25 0821    Narrative:      XR SPINE LUMBAR AP AND LATERAL- 4/30/2025 6:10 AM     HISTORY: LLIF; Z74.09-Other reduced mobility     COMPARISON: None     FLUOROSCOPY TIME: 21.4 seconds     FLUOROSCOPY DOSE: 24.5 mGy     NUMBER OF IMAGES: 4       Impression:         Intraoperative fluoroscopic  images during lumbar fusion.     Please refer to the operative note for more details.     This report was signed and finalized on 4/30/2025 8:18 AM by Dr. Cody Rogers MD.       FL C Arm During Surgery [[id number]] Resulted: 04/30/25 0817     Updated: 04/30/25 0817    Narrative:      This procedure was auto-finalized with no dictation required.                  Assessment:    Condition: In stable condition.  Improving.       Plan:   Transfer Plan: To operating room for third step.  Encourage ambulation and per physical therapy.  NPO.   (    Plan for third and final step of surgery today with discharge home over the weekend.    BPH with lower urinary tract symptoms-continue with alpha-blocker for now.  Judicious use of opioid analgesics early ambulation and aggressive bowel pattern in the immediate postoperative period.    Constipation/obstipation-continue with stool softeners after second step of surgery today can stimulate bowel movement with cathartics.    MARTI-stable    Plan for third and final step of surgery today then discharge home over the weekend.    Explained to patient and staff that we were consulted for medical management during their acute care hospitalization. The Medical Consultation was requested by the Attending Physician. The patient has been recommended to f/u with their regular primary care provider concerning any further treatment and review of abnormalities found during [facility]. They agree with the treatment plan as well as understand our role in their hospitalization. All questions were encouraged and answered to the best of my ability. ).     Problem List:     Lumbosacral radiculopathy    Lumbar radiculopathy    Lumbosacral disc disease    Lumbar spondylosis    BPH with obstruction/lower urinary tract symptoms    MARTI (generalized anxiety disorder)    Drug-induced constipation    Maged Quigley MD  5/2/2025                                                   Electronically signed by Maged Quigley MD at 05/02/25 0444       PAWEL Iyer MD at 05/01/25 1754          Mr. Valladares is doing well.  Pain controlled.  Ambulating with physical therapy.  Anterior and lateral dressings clean dry and intact.  Plan to return to surgery for the final posterior stage of the procedure tomorrow.    Electronically signed by PAWEL Iyer MD at 05/01/25 1754       Primo Avila PA-C at 05/01/25 1541          NIMO Avila PA-C   Progress Note        Subjective/Overnight Events:  No acute issues, walking well, tolerating PO, voiding    Vitals  Vitals:    05/01/25 0614 05/01/25 0804 05/01/25 1122 05/01/25 1532   BP:  116/86 113/60 142/76   BP Location:  Left arm Left arm Left arm   Patient Position:  Sitting Sitting Standing   Pulse: 90 84 82 93   Resp: 18 16 16 16   Temp:  99.2 °F (37.3 °C) 99.1 °F (37.3 °C) 98.5 °F (36.9 °C)   TempSrc:  Oral Oral Oral   SpO2: 98% 97% 98% 98%   Weight:       Height:           Current Facility-Administered Medications   Medication Dose Route Frequency Provider Last Rate Last Admin    acetaminophen (TYLENOL) tablet 650 mg  650 mg Oral Q4H PRN PAWEL Iyer MD   650 mg at 05/01/25 0555    Or    acetaminophen (TYLENOL) 160 MG/5ML oral solution 650 mg  650 mg Oral Q4H PRN PAWEL Iyer MD        Or    acetaminophen (TYLENOL) suppository 650 mg  650 mg Rectal Q4H PRN PAWEL Iyer MD        albuterol sulfate HFA (PROVENTIL HFA;VENTOLIN HFA;PROAIR HFA) inhaler 2 puff  2 puff Inhalation PRN PAWEL Iyer MD        sennosides-docusate (PERICOLACE) 8.6-50 MG per tablet 2 tablet  2 tablet Oral BID PAWEL Iyer MD   2 tablet at 05/01/25 0804    And    polyethylene glycol (MIRALAX) packet 17 g  17 g Oral Daily PRN PAWEL Iyer MD   17 g at 05/01/25 0805    And    bisacodyl (DULCOLAX) EC tablet 5 mg  5 mg Oral Daily PRN PAWEL Iyer MD        And     bisacodyl (DULCOLAX) suppository 10 mg  10 mg Rectal Daily PRN PAWEL Iyer MD        budesonide-formoterol (SYMBICORT) 160-4.5 MCG/ACT inhaler 2 puff  2 puff Inhalation BID - RT PAWEL Iyer MD   2 puff at 05/01/25 0614    [START ON 5/2/2025] ceFAZolin 2000 mg IVPB in 100 mL NS (MBP)  2,000 mg Intravenous On Call to OR PAWEL Iyer MD        citalopram (CeleXA) tablet 40 mg  40 mg Oral Daily PAWEL Iyer MD   40 mg at 05/01/25 0804    fluticasone (FLONASE) 50 MCG/ACT nasal spray 2 spray  2 spray Nasal Daily PAWEL Iyer MD   2 spray at 05/01/25 0808    hydroCHLOROthiazide tablet 50 mg  50 mg Oral Daily PAWEL Iyer MD   50 mg at 04/30/25 1002    HYDROmorphone (DILAUDID) injection 0.5 mg  0.5 mg Intravenous Q3H PRN PAWEL Iyer MD        And    naloxone (NARCAN) injection 0.4 mg  0.4 mg Intravenous Q5 Min PRN PAWEL Iyer MD        magnesium oxide (MAG-OX) tablet 400 mg  400 mg Oral Daily PAWEL Iyer MD   400 mg at 05/01/25 0804    methocarbamol (ROBAXIN) tablet 1,000 mg  1,000 mg Oral 4x Daily PRN PAWEL Iyer MD        minocycline (MINOCIN,DYNACIN) capsule 100 mg  100 mg Oral BID PAWEL Iyer MD   100 mg at 05/01/25 0805    minoxidil (LONITEN) tablet 5 mg  5 mg Oral Daily PAWEL Iyer MD   5 mg at 05/01/25 0805    ondansetron ODT (ZOFRAN-ODT) disintegrating tablet 4 mg  4 mg Oral Q6H PRN PAWEL Iyer MD        Or    ondansetron (ZOFRAN) injection 4 mg  4 mg Intravenous Q6H PRN PAWEL Iyer MD        oxyCODONE-acetaminophen (PERCOCET)  MG per tablet 1 tablet  1 tablet Oral Q4H PRN PAWEL Iyer MD   1 tablet at 05/01/25 1310    oxyCODONE-acetaminophen (PERCOCET) 7.5-325 MG per tablet 1 tablet  1 tablet Oral Q4H PRN PAWEL Iyer MD   1 tablet at 04/30/25 1812    pantoprazole (PROTONIX) EC tablet 40 mg  40 mg Oral Q AM PAWEL Iyer MD   40 mg at 05/01/25 0528    potassium chloride (KAYCIEL) 20  mEq/15 mL solution 40 mEq  40 mEq Oral Daily PAWEL Iyer MD   40 mEq at 05/01/25 0805    sodium chloride 0.9 % flush 10 mL  10 mL Intravenous PRN PAWEL Iyer MD        sodium chloride 0.9 % flush 3 mL  3 mL Intravenous Q12H PAWEL Iyer MD   3 mL at 05/01/25 0808    sodium chloride 0.9 % infusion 40 mL  40 mL Intravenous PRN PAWEL Iyer MD        tamsulosin (FLOMAX) 24 hr capsule 0.4 mg  0.4 mg Oral BID PAWEL Iyer MD   0.4 mg at 05/01/25 0805       PHYSICAL EXAM:    Orientation:  alert and oriented to person, place, and time    Incision:  no significant drainage    Upper Extremity Motor :  5/5 bilaterally    Upper Motor Neuron Signs:  none     Lower Extremity Motor :  equal bilaterally    Lower Extremity Sensory:  Tibial nerve: Intact, Superficial peroneal nerve: Intact, and Deep peroneal nerve: Intact    Flatus:  flatus    ABNORMAL EXAM FINDINGS:  none    LABS:    Lab Results (last 24 hours)       Procedure Component Value Units Date/Time    Basic Metabolic Panel [[id number]]  (Abnormal) Collected: 05/01/25 0236    Specimen: Blood Updated: 05/01/25 0408     Glucose 85 mg/dL      BUN 9 mg/dL      Creatinine 0.75 mg/dL      Sodium 136 mmol/L      Potassium 3.0 mmol/L      Chloride 102 mmol/L      CO2 28.0 mmol/L      Calcium 8.1 mg/dL      BUN/Creatinine Ratio 12.0     Anion Gap 6.0 mmol/L      eGFR 104.6 mL/min/1.73     Narrative:      GFR Categories in Chronic Kidney Disease (CKD)              GFR Category          GFR (mL/min/1.73)    Interpretation  G1                    90 or greater        Normal or high (1)  G2                    60-89                Mild decrease (1)  G3a                   45-59                Mild to moderate decrease  G3b                   30-44                Moderate to severe decrease  G4                    15-29                Severe decrease  G5                    14 or less           Kidney failure    (1)In the absence of evidence of kidney  disease, neither GFR category G1 or G2 fulfill the criteria for CKD.    eGFR calculation 2021 CKD-EPI creatinine equation, which does not include race as a factor            ASSESSMENT AND PLAN:    Status post staged lumbar fusion L4-S1    1:  Activity Level:  Up with assist  2:  Pain Control:  Oral analgesia  3:  Discharge Planning:  After final stage  4:  Other:  NPO after midnight, ABX on call to OR      Electronically signed by Primo Avila PA-C 5/1/2025 15:41 CDT                Electronically signed by Primo Avila PA-C at 05/01/25 1543       Maged Quigley MD at 05/01/25 0824          Kurtis Valladares is a 59 y.o. male patient.      Postoperative day #1 from second step with planned third and final step of surgery tomorrow.        Wound looks good.  Current Facility-Administered Medications   Medication Dose Route Frequency Provider Last Rate Last Admin    acetaminophen (TYLENOL) tablet 650 mg  650 mg Oral Q4H PRN PAWEL Iyer MD   650 mg at 05/01/25 0555    Or    acetaminophen (TYLENOL) 160 MG/5ML oral solution 650 mg  650 mg Oral Q4H PRN PAWEL Iyer MD        Or    acetaminophen (TYLENOL) suppository 650 mg  650 mg Rectal Q4H PRN PAWEL Iyer MD        albuterol sulfate HFA (PROVENTIL HFA;VENTOLIN HFA;PROAIR HFA) inhaler 2 puff  2 puff Inhalation PRN PAWEL Iyer MD        sennosides-docusate (PERICOLACE) 8.6-50 MG per tablet 2 tablet  2 tablet Oral BID PAWEL Iyer MD   2 tablet at 05/01/25 0804    And    polyethylene glycol (MIRALAX) packet 17 g  17 g Oral Daily PRN PAWEL Iyer MD   17 g at 05/01/25 0805    And    bisacodyl (DULCOLAX) EC tablet 5 mg  5 mg Oral Daily PRN PAWEL Iyer MD        And    bisacodyl (DULCOLAX) suppository 10 mg  10 mg Rectal Daily PRN PAWEL Iyer MD        budesonide-formoterol (SYMBICORT) 160-4.5 MCG/ACT inhaler 2 puff  2 puff Inhalation BID - RT PAWEL Iyer MD   2 puff at  05/01/25 0614    citalopram (CeleXA) tablet 40 mg  40 mg Oral Daily PAWEL Iyer MD   40 mg at 05/01/25 0804    fluticasone (FLONASE) 50 MCG/ACT nasal spray 2 spray  2 spray Nasal Daily PAWEL Iyer MD   2 spray at 05/01/25 0808    hydroCHLOROthiazide tablet 50 mg  50 mg Oral Daily PAWEL Iyer MD   50 mg at 04/30/25 1002    HYDROmorphone (DILAUDID) injection 0.5 mg  0.5 mg Intravenous Q3H PRN PAWEL Iyer MD        And    naloxone (NARCAN) injection 0.4 mg  0.4 mg Intravenous Q5 Min PRN PAWEL Iyer MD        magnesium oxide (MAG-OX) tablet 400 mg  400 mg Oral Daily PAWEL Iyer MD   400 mg at 05/01/25 0804    methocarbamol (ROBAXIN) tablet 1,000 mg  1,000 mg Oral 4x Daily PRN PAWEL Iyer MD        minocycline (MINOCIN,DYNACIN) capsule 100 mg  100 mg Oral BID PAWEL Iyer MD   100 mg at 05/01/25 0805    minoxidil (LONITEN) tablet 5 mg  5 mg Oral Daily PAWEL Iyer MD   5 mg at 05/01/25 0805    ondansetron ODT (ZOFRAN-ODT) disintegrating tablet 4 mg  4 mg Oral Q6H PRN PAWEL Iyer MD        Or    ondansetron (ZOFRAN) injection 4 mg  4 mg Intravenous Q6H PRN PAWEL Iyer MD        oxyCODONE-acetaminophen (PERCOCET)  MG per tablet 1 tablet  1 tablet Oral Q4H PRN PAWEL Iyer MD   1 tablet at 05/01/25 0804    oxyCODONE-acetaminophen (PERCOCET) 7.5-325 MG per tablet 1 tablet  1 tablet Oral Q4H PRN PAWEL Iyer MD   1 tablet at 04/30/25 1812    pantoprazole (PROTONIX) EC tablet 40 mg  40 mg Oral Q AM PAWEL Iyer MD   40 mg at 05/01/25 0528    potassium chloride (KAYCIEL) 20 mEq/15 mL solution 40 mEq  40 mEq Oral Daily PAWEL Iyer MD   40 mEq at 05/01/25 0805    sodium chloride 0.9 % flush 10 mL  10 mL Intravenous PRN PAWEL Iyer MD        sodium chloride 0.9 % flush 3 mL  3 mL Intravenous Q12H PAWEL Iyer MD   3 mL at 05/01/25 0808    sodium chloride 0.9 % infusion 40 mL  40 mL Intravenous PRN  "PAWEL Iyer MD        tamsulosin (FLOMAX) 24 hr capsule 0.4 mg  0.4 mg Oral BID PAWEL Iyer MD   0.4 mg at 05/01/25 0805     ALLERGIES:  No Known Allergies    Lumbosacral radiculopathy    Lumbar radiculopathy    Lumbosacral disc disease    Lumbar spondylosis    BPH with obstruction/lower urinary tract symptoms    MARTI (generalized anxiety disorder)    Drug-induced constipation    Blood pressure 116/86, pulse 84, temperature 99.2 °F (37.3 °C), temperature source Oral, resp. rate 16, height 185 cm (72.84\"), weight 110 kg (242 lb 4.6 oz), SpO2 97%.      Subjective:  Symptoms:  Stable.    Diet:  NPO.    Activity level: Impaired due to pain.    Pain:  He complains of pain that is moderate.  He reports pain is unchanged.  Pain is partially controlled.      Review of Systems  Objective:  General Appearance:  Comfortable, well-appearing, in no acute distress and in pain.    Vital signs: (most recent): Blood pressure 116/86, pulse 84, temperature 99.2 °F (37.3 °C), temperature source Oral, resp. rate 16, height 185 cm (72.84\"), weight 110 kg (242 lb 4.6 oz), SpO2 97%.  Vital signs are normal.    Output: Producing urine and minimal stool output.    HEENT: Normal HEENT exam.    Lungs:  Normal effort and normal respiratory rate.    Heart: Normal rate.  Regular rhythm.    Abdomen: Abdomen is soft.  Hypoactive bowel sounds.   There is generalized tenderness.     Extremities: Decreased range of motion.    Neurological: Patient is alert.    Skin:  Warm and dry.                Labs:  Lab Results (last 72 hours)       Procedure Component Value Units Date/Time    Folate [[id number]]  (Normal) Collected: 04/29/25 0314    Specimen: Blood Updated: 04/29/25 1200     Folate 11.90 ng/mL     Narrative:      Results may be falsely increased if patient taking Biotin.      Vitamin B12 [[id number]]  (Normal) Collected: 04/29/25 0314    Specimen: Blood Updated: 04/29/25 1200     Vitamin B-12 713 pg/mL     Narrative:      Results may " be falsely increased if patient taking Biotin.      CBC & Differential [[id number]]  (Abnormal) Collected: 04/29/25 0315    Specimen: Blood Updated: 04/29/25 0414    Narrative:      The following orders were created for panel order CBC & Differential.  Procedure                               Abnormality         Status                     ---------                               -----------         ------                     CBC Auto Differential[[id number]]        Abnormal            Final result                 Please view results for these tests on the individual orders.    CBC Auto Differential [[id number]]  (Abnormal) Collected: 04/29/25 0315    Specimen: Blood Updated: 04/29/25 0414     WBC 6.44 10*3/mm3      RBC 3.53 10*6/mm3      Hemoglobin 10.5 g/dL      Hematocrit 31.4 %      MCV 89.0 fL      MCH 29.7 pg      MCHC 33.4 g/dL      RDW 12.8 %      RDW-SD 41.5 fl      MPV 9.1 fL      Platelets 237 10*3/mm3      Neutrophil % 71.5 %      Lymphocyte % 19.3 %      Monocyte % 8.1 %      Eosinophil % 0.5 %      Basophil % 0.3 %      Immature Grans % 0.3 %      Neutrophils, Absolute 4.61 10*3/mm3      Lymphocytes, Absolute 1.24 10*3/mm3      Monocytes, Absolute 0.52 10*3/mm3      Eosinophils, Absolute 0.03 10*3/mm3      Basophils, Absolute 0.02 10*3/mm3      Immature Grans, Absolute 0.02 10*3/mm3      nRBC 0.0 /100 WBC     Basic Metabolic Panel [[id number]]  (Abnormal) Collected: 04/29/25 0315    Specimen: Blood Updated: 04/29/25 0356     Glucose 95 mg/dL      BUN 6 mg/dL      Creatinine 0.81 mg/dL      Sodium 139 mmol/L      Potassium 3.2 mmol/L      Chloride 104 mmol/L      CO2 27.0 mmol/L      Calcium 8.2 mg/dL      BUN/Creatinine Ratio 7.4     Anion Gap 8.0 mmol/L      eGFR 102.2 mL/min/1.73     Narrative:      GFR Categories in Chronic Kidney Disease (CKD)      GFR Category          GFR (mL/min/1.73)    Interpretation  G1                     90 or greater         Normal or high (1)  G2                      60-89                 Mild decrease (1)  G3a                   45-59                Mild to moderate decrease  G3b                   30-44                Moderate to severe decrease  G4                    15-29                Severe decrease  G5                    14 or less           Kidney failure          (1)In the absence of evidence of kidney disease, neither GFR category G1 or G2 fulfill the criteria for CKD.    eGFR calculation 2021 CKD-EPI creatinine equation, which does not include race as a factor    Iron Profile [[id number]]  (Abnormal) Collected: 04/29/25 0315    Specimen: Blood Updated: 04/29/25 0356     Iron 30 mcg/dL      Iron Saturation (TSAT) 15 %      Transferrin 136 mg/dL      TIBC 203 mcg/dL             Imaging Results (Last 72 Hours)       Procedure Component Value Units Date/Time    XR Spine Lumbar AP & Lateral [[id number]] Collected: 04/30/25 0818     Updated: 04/30/25 0821    Narrative:      XR SPINE LUMBAR AP AND LATERAL- 4/30/2025 6:10 AM     HISTORY: LLIF; Z74.09-Other reduced mobility     COMPARISON: None     FLUOROSCOPY TIME: 21.4 seconds     FLUOROSCOPY DOSE: 24.5 mGy     NUMBER OF IMAGES: 4       Impression:         Intraoperative fluoroscopic images during lumbar fusion.     Please refer to the operative note for more details.     This report was signed and finalized on 4/30/2025 8:18 AM by Dr. Cody Rogers MD.       FL C Arm During Surgery [[id number]] Resulted: 04/30/25 0817     Updated: 04/30/25 0817    Narrative:      This procedure was auto-finalized with no dictation required.    XR Abdomen 1 View [[id number]] Collected: 04/28/25 1248     Updated: 04/28/25 1254    Narrative:      EXAM: XR ABDOMEN 1 VW-      DATE: 4/28/2025 11:23 AM     HISTORY: no count in OR       COMPARISON: 3/1/2024.     TECHNIQUE: Single supine view of the abdomen. 1 image.     FINDINGS:    Limited evaluation for free air on supine imaging. Nonobstructive supine  bowel gas pattern with gas and stool to the level of the  "rectum.     Postoperative and degenerative changes of the lumbosacral spine, not  optimally assessed on this exam. Surgical clips at the LEFT pelvis.     Degenerative changes of the hips, not optimally assessed on this exam.     There is a 1 cm density projecting at the RIGHT iliac wing, which could  represent bone lesion, ingested contents, or structure external to the  patient.          Impression:      1. Postoperative and degenerative changes of the lumbosacral spine, not  optimally assessed on this exam. Surgical clips at the LEFT pelvis.     2. There is a 1 cm density projecting at the RIGHT iliac wing, could  represent bone lesion, ingested contents, or structure external to the  patient. Consider correlation with serum PSA and CT pelvis for further  evaluation.     Exam was tagged in PACS with \"incidental findings\" to assist in  appropriate follow up.     This report was signed and finalized on 4/28/2025 12:51 PM by Dr Kathleen Carballo MD.       XR Spine Lumbar AP & Lateral [[id number]] Collected: 04/28/25 1227     Updated: 04/28/25 1231    Narrative:      XR SPINE LUMBAR AP AND LATERAL- 4/28/2025 9:15 AM     HISTORY: alif     COMPARISON: None     FLUOROSCOPY TIME: 10.3 seconds     FLUOROSCOPY DOSE: 10.0 mGy     NUMBER OF IMAGES: 5       Impression:         Intraoperative fluoroscopic images during lumbar fusion.     Please refer to the operative note for more details.     This report was signed and finalized on 4/28/2025 12:28 PM by Dr. Cody Rogers MD.       FL C Arm During Surgery [[id number]] Resulted: 04/28/25 1223     Updated: 04/28/25 1223    Narrative:      This procedure was auto-finalized with no dictation required.                  Assessment:    Condition: In stable condition.  Improving.       Plan:   Transfer Plan: To operating room for second step.  Encourage ambulation and per physical therapy.  Regular diet.   (    Postop day #1 from second step of surgery was planned final step of " surgery tomorrow.    BPH with lower urinary tract symptoms-continue with alpha-blocker for now.  Judicious use of opioid analgesics early ambulation and aggressive bowel pattern in the immediate postoperative period.    Constipation/obstipation-continue with stool softeners after second step of surgery today can stimulate bowel movement with cathartics.    MARTI-stable    Plan for third and final step of surgery tomorrow then discharge home over the weekend.    ).     Problem List:     Lumbosacral radiculopathy    Lumbar radiculopathy    Lumbosacral disc disease    Lumbar spondylosis    BPH with obstruction/lower urinary tract symptoms    MARTI (generalized anxiety disorder)    Drug-induced constipation    Maged Quigley MD  5/1/2025                                                  Electronically signed by Maged Quigley MD at 05/01/25 0825         PRN MED 5/2  dilaudid iv x2  so far today

## 2025-05-02 NOTE — ANESTHESIA POSTPROCEDURE EVALUATION
"Patient: Kurtis Valladares    Procedure Summary       Date: 05/02/25 Room / Location:  [facility]  /  [facility]    Anesthesia Start: 0920 Anesthesia Stop: 1146    Procedure: POSTERIOR SPINAL FUSION WITH INSTRUMENTATION L3-S1 (Spine Lumbar) Diagnosis: (M54.16)    Surgeons: PAWEL Iyer MD Provider: Rose Mary Packer CRNA    Anesthesia Type: general ASA Status: 2            Anesthesia Type: general    Vitals  Vitals Value Taken Time   /80 05/02/25 12:42   Temp 98 °F (36.7 °C) 05/02/25 11:48   Pulse 78 05/02/25 12:44   Resp 15 05/02/25 12:30   SpO2 96 % 05/02/25 12:44   Vitals shown include unfiled device data.        Post Anesthesia Care and Evaluation    Patient location during evaluation: PACU  Patient participation: complete - patient participated  Level of consciousness: awake and alert  Pain management: adequate    Airway patency: patent  Anesthetic complications: No anesthetic complications    Cardiovascular status: acceptable  Respiratory status: acceptable  Hydration status: acceptable    Comments: Blood pressure 139/87, pulse 86, temperature 98 °F (36.7 °C), temperature source Temporal, resp. rate 15, height 185 cm (72.84\"), weight 110 kg (242 lb 4.6 oz), SpO2 92%.    Pt discharged from PACU based on nettie score >8    "

## 2025-05-02 NOTE — ANESTHESIA PREPROCEDURE EVALUATION
Anesthesia Evaluation     Patient summary reviewed and Nursing notes reviewed   no history of anesthetic complications:   NPO Solid Status: > 8 hours  NPO Liquid Status: > 8 hours           Airway   Mallampati: I  TM distance: >3 FB  No difficulty expected  Dental      Pulmonary    (+) asthma (as child),sleep apnea  Cardiovascular   Exercise tolerance: good (4-7 METS)    ECG reviewed    (+) hypertension  (-) CAD      Neuro/Psych  (-) seizures, TIA, CVA  GI/Hepatic/Renal/Endo    (+) obesity, GERD  (-) liver disease, no renal disease, diabetes    Musculoskeletal     Abdominal    Substance History      OB/GYN          Other   arthritis,                 Anesthesia Plan    ASA 2     general     intravenous induction     Anesthetic plan, risks, benefits, and alternatives have been provided, discussed and informed consent has been obtained with: patient.    CODE STATUS:    Code Status (Patient has no pulse and is not breathing): CPR (Attempt to Resuscitate)  Medical Interventions (Patient has pulse or is breathing): Full Support

## 2025-05-03 ENCOUNTER — READMISSION MANAGEMENT (OUTPATIENT)
Dept: CALL CENTER | Facility: HOSPITAL | Age: 60
End: 2025-05-03
Payer: MEDICAID

## 2025-05-03 VITALS
HEIGHT: 73 IN | TEMPERATURE: 99 F | SYSTOLIC BLOOD PRESSURE: 116 MMHG | BODY MASS INDEX: 32.11 KG/M2 | OXYGEN SATURATION: 99 % | HEART RATE: 76 BPM | WEIGHT: 242.29 LBS | DIASTOLIC BLOOD PRESSURE: 62 MMHG | RESPIRATION RATE: 18 BRPM

## 2025-05-03 LAB
ANION GAP SERPL CALCULATED.3IONS-SCNC: 8 MMOL/L (ref 5–15)
BUN SERPL-MCNC: 12 MG/DL (ref 6–20)
BUN/CREAT SERPL: 17.1 (ref 7–25)
CALCIUM SPEC-SCNC: 8.4 MG/DL (ref 8.6–10.5)
CHLORIDE SERPL-SCNC: 100 MMOL/L (ref 98–107)
CO2 SERPL-SCNC: 28 MMOL/L (ref 22–29)
CREAT SERPL-MCNC: 0.7 MG/DL (ref 0.76–1.27)
EGFRCR SERPLBLD CKD-EPI 2021: 106.8 ML/MIN/1.73
GLUCOSE SERPL-MCNC: 117 MG/DL (ref 65–99)
POTASSIUM SERPL-SCNC: 3.7 MMOL/L (ref 3.5–5.2)
SODIUM SERPL-SCNC: 136 MMOL/L (ref 136–145)

## 2025-05-03 PROCEDURE — 94799 UNLISTED PULMONARY SVC/PX: CPT

## 2025-05-03 PROCEDURE — 80048 BASIC METABOLIC PNL TOTAL CA: CPT | Performed by: ORTHOPAEDIC SURGERY

## 2025-05-03 RX ORDER — ONDANSETRON 4 MG/1
4 TABLET, ORALLY DISINTEGRATING ORAL EVERY 8 HOURS PRN
Qty: 30 TABLET | Refills: 0 | Status: SHIPPED | OUTPATIENT
Start: 2025-05-03

## 2025-05-03 RX ORDER — METHOCARBAMOL 1000 MG/1
1000 TABLET, FILM COATED ORAL 3 TIMES DAILY PRN
Qty: 30 TABLET | Refills: 0 | Status: SHIPPED | OUTPATIENT
Start: 2025-05-03

## 2025-05-03 RX ADMIN — BUDESONIDE AND FORMOTEROL FUMARATE DIHYDRATE 2 PUFF: 160; 4.5 AEROSOL RESPIRATORY (INHALATION) at 07:04

## 2025-05-03 RX ADMIN — PANTOPRAZOLE SODIUM 40 MG: 40 TABLET, DELAYED RELEASE ORAL at 05:23

## 2025-05-03 RX ADMIN — Medication 400 MG: at 08:28

## 2025-05-03 RX ADMIN — OXYCODONE HYDROCHLORIDE AND ACETAMINOPHEN 1 TABLET: 7.5; 325 TABLET ORAL at 03:53

## 2025-05-03 RX ADMIN — CITALOPRAM HYDROBROMIDE 40 MG: 20 TABLET ORAL at 08:28

## 2025-05-03 RX ADMIN — MINOXIDIL 5 MG: 2.5 TABLET ORAL at 08:28

## 2025-05-03 RX ADMIN — Medication 3 ML: at 08:30

## 2025-05-03 RX ADMIN — SENNOSIDES, DOCUSATE SODIUM 2 TABLET: 50; 8.6 TABLET, FILM COATED ORAL at 08:28

## 2025-05-03 RX ADMIN — TAMSULOSIN HYDROCHLORIDE 0.4 MG: 0.4 CAPSULE ORAL at 08:28

## 2025-05-03 RX ADMIN — FLUTICASONE PROPIONATE 2 SPRAY: 50 SPRAY, METERED NASAL at 08:29

## 2025-05-03 RX ADMIN — MINOCYCLINE HYDROCHLORIDE 100 MG: 50 CAPSULE ORAL at 08:29

## 2025-05-03 RX ADMIN — OXYCODONE HYDROCHLORIDE AND ACETAMINOPHEN 1 TABLET: 7.5; 325 TABLET ORAL at 08:29

## 2025-05-03 RX ADMIN — POTASSIUM CHLORIDE 40 MEQ: 20 SOLUTION ORAL at 08:28

## 2025-05-03 RX ADMIN — HYDROCHLOROTHIAZIDE 50 MG: 25 TABLET ORAL at 08:29

## 2025-05-03 NOTE — PLAN OF CARE
Goal Outcome Evaluation:  Plan of Care Reviewed With: patient, spouse        Progress: improving  Outcome Evaluation: AOx4. VSS on RA. Up adlib. Pt NPO since midnight for part 3 of 3 surgeries today. Patient returned to floor early afternoon. Neuro checks q4h. No changes noted. Pain medication given upon request with relief noted. Surgical dressings CDI. No drainage noted. IV abx given as ordered. Safety maintained. Call light within reach.

## 2025-05-03 NOTE — PROGRESS NOTES
"    Daily Progress Note  Kurtis Valladares  MRN: [id number] LOS: 5    Admit Date: 2025   5/3/2025 08:42 CDT    Subjective:         Interval History:    Reviewed overnight events and nursing notes.       No major complaints, no bowel movement yet.  Asking to go home.    ROS:  Review of Systems   Constitutional:  Negative for chills and fever.   Respiratory:  Negative for cough, chest tightness and shortness of breath.    Cardiovascular:  Negative for chest pain.   Gastrointestinal:  Negative for abdominal pain, diarrhea, nausea and vomiting.       DIET:  Diet: Regular/House; Fluid Consistency: Thin (IDDSI 0)    Medications:      budesonide-formoterol, 2 puff, Inhalation, BID - RT  citalopram, 40 mg, Oral, Daily  fluticasone, 2 spray, Nasal, Daily  hydroCHLOROthiazide, 50 mg, Oral, Daily  magnesium oxide, 400 mg, Oral, Daily  minocycline, 100 mg, Oral, BID  minoxidil, 5 mg, Oral, Daily  pantoprazole, 40 mg, Oral, Q AM  potassium chloride, 40 mEq, Oral, Daily  senna-docusate sodium, 2 tablet, Oral, BID  sodium chloride, 3 mL, Intravenous, Q12H  tamsulosin, 0.4 mg, Oral, BID          Objective:     Vitals: /66 (BP Location: Right arm, Patient Position: Lying)   Pulse 77   Temp 98 °F (36.7 °C) (Oral)   Resp 18   Ht 185 cm (72.84\")   Wt 110 kg (242 lb 4.6 oz)   SpO2 100%   BMI 32.11 kg/m²    Intake/Output Summary (Last 24 hours) at 5/3/2025 0842  Last data filed at 2025 1109  Gross per 24 hour   Intake 800 ml   Output --   Net 800 ml    Temp (24hrs), Av °F (36.7 °C), Min:97.8 °F (36.6 °C), Max:98.2 °F (36.8 °C)    Glucose:  No results found for: \"POCGLU\"  Physical Examination:   Physical Exam  Constitutional:       General: He is not in acute distress.     Appearance: Normal appearance. He is normal weight. He is not ill-appearing.   Cardiovascular:      Rate and Rhythm: Normal rate and regular rhythm.      Pulses: Normal pulses.      Heart sounds: Normal heart sounds. No murmur " heard.  Pulmonary:      Effort: Pulmonary effort is normal.      Breath sounds: Normal breath sounds. No wheezing, rhonchi or rales.   Abdominal:      General: Abdomen is flat. Bowel sounds are normal.      Palpations: Abdomen is soft.      Tenderness: There is no abdominal tenderness.   Musculoskeletal:      Right lower leg: No edema.      Left lower leg: No edema.   Neurological:      Mental Status: He is alert.         Labs:  Lab Results (last 24 hours)       Procedure Component Value Units Date/Time    Basic Metabolic Panel [[id number]]  (Abnormal) Collected: 05/03/25 0202    Specimen: Blood Updated: 05/03/25 0232     Glucose 117 mg/dL      BUN 12 mg/dL      Creatinine 0.70 mg/dL      Sodium 136 mmol/L      Potassium 3.7 mmol/L      Chloride 100 mmol/L      CO2 28.0 mmol/L      Calcium 8.4 mg/dL      BUN/Creatinine Ratio 17.1     Anion Gap 8.0 mmol/L      eGFR 106.8 mL/min/1.73     Narrative:      GFR Categories in Chronic Kidney Disease (CKD)              GFR Category          GFR (mL/min/1.73)    Interpretation  G1                    90 or greater        Normal or high (1)  G2                    60-89                Mild decrease (1)  G3a                   45-59                Mild to moderate decrease  G3b                   30-44                Moderate to severe decrease  G4                    15-29                Severe decrease  G5                    14 or less           Kidney failure    (1)In the absence of evidence of kidney disease, neither GFR category G1 or G2 fulfill the criteria for CKD.    eGFR calculation 2021 CKD-EPI creatinine equation, which does not include race as a factor             Imaging:  XR Spine Lumbar AP & Lateral  Result Date: 5/2/2025  XR SPINE LUMBAR AP AND LATERAL- 5/2/2025 8:30 AM  HISTORY: fusion; Z74.09-Other reduced mobility  COMPARISON: None  FLUOROSCOPY TIME: 1 minute 14 seconds  FLUOROSCOPY DOSE: 78.1 mGy  NUMBER OF IMAGES: 2      Impression:  Intraoperative fluoroscopic  images during lumbar fusion.  Please refer to the operative note for more details.  This report was signed and finalized on 5/2/2025 11:37 AM by Dr. Cody Rogers MD.      FL C Arm During Surgery  Result Date: 5/2/2025  This procedure was auto-finalized with no dictation required.         Assessment and Plan:     Primary Problem:  Lumbosacral radiculopathy    [facility] Problem list:    Lumbosacral radiculopathy    Lumbar radiculopathy    Lumbosacral disc disease    Lumbar spondylosis    BPH with obstruction/lower urinary tract symptoms    MARTI (generalized anxiety disorder)    Drug-induced constipation      Plan:    -Medically stable for discharge home.  Counseled on MiraLAX until he has a bowel movement at home.      Reviewed treatment plans with the patient and/or family.     Code Status:   Code Status and Medical Interventions: CPR (Attempt to Resuscitate); Full Support   Ordered at: 04/28/25 1341     Code Status (Patient has no pulse and is not breathing):    CPR (Attempt to Resuscitate)     Medical Interventions (Patient has pulse or is breathing):    Full Support       Electronically signed by Osbaldo Pastrana MD on 5/3/2025 at 08:42 CDT

## 2025-05-03 NOTE — PLAN OF CARE
Goal Outcome Evaluation:  Plan of Care Reviewed With: patient        Progress: no change  Outcome Evaluation: VSS. A&Ox4. Up ad dinesh w/ LSO. No changes in neuro/NVs. C/o back and abdominal pain, relief w/ prns, cold packs and rest. Drsg to posterior lower lumbar c/d/i w/o drng. Incision to L flank c/d/i, MIKEY w/o drng. WV to abdominal incision. IV Abx given per orders. V. SCDs. Resting comfortably. Safety maintained.

## 2025-05-03 NOTE — PLAN OF CARE
Goal Outcome Evaluation:  Plan of Care Reviewed With: patient        Progress: improving  Outcome Evaluation: AOx4. VSS on RA. Up adlib with LSO. Neuro checks q4h. No changes noted. PRN pain medication given upon request with relief noted. Prevena dressing to abdomen CDI and wound vac appears to be functioning properly. L flank dressing CDI. Dressing to posterior lumbar incision changed per orders and is CDI. IV abx given as ordered. Patient to discharge home today. Safety maintained. Call light with reach.

## 2025-05-03 NOTE — PROGRESS NOTES
Mr. Valladares is doing well.  He is up after a shower getting dressed and ready for discharge home.  He needs his posterior dressing changed to a more water resistant one but his lateral and anterior dressings are clean dry and intact.  Sensation and motor function intact distally.  Plan discharge home today follow-up in the office in approximately 1 to 2 weeks.

## 2025-05-03 NOTE — DISCHARGE SUMMARY
[facility]  Primo Avila PA-C  DISCHARGE SUMMARY  Patient ID:  Kurtis Valladares  [id number]  59 y.o.  1965    Admit date: 4/28/2025    Discharge date and time: 5/3/2025    Admitting Physician: PAWEL Iyer MD     Indication for Admission: Planned surgical admission     Admission Diagnoses: Lumbosacral radiculopathy [M54.17]    Discharge Diagnoses: Lumbosacral radiculopathy [M54.17]    Procedures: Staged lumbar fusion L3-S1    Problem List:   Lumbosacral radiculopathy    Lumbar radiculopathy    Lumbosacral disc disease    Lumbar spondylosis    BPH with obstruction/lower urinary tract symptoms    MARTI (generalized anxiety disorder)    Drug-induced constipation      Consults:  Family Medicine    Admission Condition: stable    Discharged Condition: stable    Hospital Course: No immediate post operative complication     Disposition: home    Patient Instructions:      Discharge Medications        New Medications        Instructions Start Date   Methocarbamol 1000 MG tablet   1,000 mg, Oral, 3 Times Daily PRN      ondansetron ODT 4 MG disintegrating tablet  Commonly known as: ZOFRAN-ODT   4 mg, Oral, Every 8 Hours PRN             Continue These Medications        Instructions Start Date   Advair Diskus 100-50 MCG/ACT DISKUS  Generic drug: Fluticasone-Salmeterol   1 puff, 2 Times Daily - RT      albuterol sulfate  (90 Base) MCG/ACT inhaler  Commonly known as: PROVENTIL HFA;VENTOLIN HFA;PROAIR HFA   2 puffs, As Needed      citalopram 40 MG tablet  Commonly known as: CeleXA   40 mg, Daily      DICLOFENAC SODIUM ER PO   150 mg, Oral, Daily      finasteride 1 MG tablet  Commonly known as: PROPECIA   1 mg, Daily      fluticasone 50 MCG/ACT nasal spray  Commonly known as: FLONASE   2 sprays, Nasal, Daily      hydroCHLOROthiazide 50 MG tablet   50 mg, Oral, 2 Times Daily      loratadine 10 MG tablet  Commonly known as: CLARITIN   10 mg, Daily      magnesium oxide 400 MG tablet  Commonly known  as: MAG-OX   400 mg, Daily      minocycline 100 MG capsule  Commonly known as: MINOCIN,DYNACIN   100 mg, Oral, 2 Times Daily      minoxidil 2.5 MG tablet  Commonly known as: LONITEN   2.5 mg, Daily      multivitamin with minerals tablet tablet   1 tablet, Daily      omeprazole 20 MG capsule  Commonly known as: priLOSEC   20 mg, Daily      oxyCODONE-acetaminophen  MG per tablet  Commonly known as: PERCOCET   1 tablet, Oral, Every 8 Hours PRN      Potassium Chloride 40 MEQ/15ML (20%) solution   40 mEq, Daily      tamsulosin 0.4 MG capsule 24 hr capsule  Commonly known as: FLOMAX   1 capsule, 2 Times Daily             Activity: Avoid bending lifting or twisting, brace when up and out of bed, no driving while taking narcotic pain medication, walk 15 minutes 4 times daily  Diet: regular diet  Wound Care: keep wound clean and dry  Other: Contact [facility] office with questions or concerns    Follow-up with Dr Iyer or PA's in 2 weeks.    Electronically signed by Primo Avila PA-C 12:16 CDT 5/3/2025

## 2025-05-03 NOTE — OUTREACH NOTE
Prep Survey      Flowsheet Row Responses   [facility] facility patient discharged from? [facility]   Is LACE score < 7 ? No   Eligibility Readm Mgmt   Discharge diagnosis back surgery   Does the patient have one of the following disease processes/diagnoses(primary or secondary)? General Surgery   Does the patient have Home health ordered? No   Is there a DME ordered? No   Prep survey completed? Yes            NAIF FERRO - Registered Nurse

## 2025-05-04 NOTE — PAYOR COMM NOTE
"UT HOME 5-3-25    Viviane Ley (59 y.o. Male)       Date of Birth   1965    Social Security Number       Address   [address] [place] [id number]    Home Phone   [phone]    MRN   [id number]       Jainism   Scientology    Marital Status                               Admission Date   4/28/2025    Admission Type   Elective    Admitting Provider   PAWEL Iyer MD    Attending Provider       Department, Room/Bed   [facility] 3A, 343/1       Discharge Date   5/3/2025    Discharge Disposition   Home or Self Care    Discharge Destination                                 Attending Provider: (none)   Allergies: No Known Allergies    Isolation: None   Infection: None   Code Status: Prior    Ht: 185 cm (72.84\")   Wt: 110 kg (242 lb 4.6 oz)    Admission Cmt: None   Principal Problem: Lumbosacral radiculopathy [M54.17]                   Active Insurance as of 4/28/2025       Primary Coverage       Payor Plan Insurance Group Employer/Plan Group    [facility] COMMUNITY PLAN [facility] PLAN [facility]       Payor Plan Address Payor Plan Phone Number Payor Plan Fax Number Effective Dates    [address]   1/1/2025 - None Entered    [facility] [id number]         Subscriber Name Subscriber Birth Date Member ID       VIVIANE LEY 1965 [id number]                     Emergency Contacts        (Rel.) Home Phone Work Phone Mobile Phone    Candi Ley (Spouse) -- -- [phone]    Kourtney Ley (Daughter) -- -- [phone]                 Discharge Summary        Primo Avila PA-C at 05/03/25 1216          [facility]  Primo Avila PA-C  DISCHARGE SUMMARY  Patient ID:  Viviane Ley  [id number]  59 y.o.  1965    Admit date: 4/28/2025    Discharge date and time: 5/3/2025    Admitting Physician: PAWEL Iyer MD     Indication for Admission: Planned surgical admission     Admission Diagnoses: " Lumbosacral radiculopathy [M54.17]    Discharge Diagnoses: Lumbosacral radiculopathy [M54.17]    Procedures: Staged lumbar fusion L3-S1    Problem List:   Lumbosacral radiculopathy    Lumbar radiculopathy    Lumbosacral disc disease    Lumbar spondylosis    BPH with obstruction/lower urinary tract symptoms    MARTI (generalized anxiety disorder)    Drug-induced constipation      Consults:  Family Medicine    Admission Condition: stable    Discharged Condition: stable    Hospital Course: No immediate post operative complication     Disposition: home    Patient Instructions:      Discharge Medications        New Medications        Instructions Start Date   Methocarbamol 1000 MG tablet   1,000 mg, Oral, 3 Times Daily PRN      ondansetron ODT 4 MG disintegrating tablet  Commonly known as: ZOFRAN-ODT   4 mg, Oral, Every 8 Hours PRN             Continue These Medications        Instructions Start Date   Advair Diskus 100-50 MCG/ACT DISKUS  Generic drug: Fluticasone-Salmeterol   1 puff, 2 Times Daily - RT      albuterol sulfate  (90 Base) MCG/ACT inhaler  Commonly known as: PROVENTIL HFA;VENTOLIN HFA;PROAIR HFA   2 puffs, As Needed      citalopram 40 MG tablet  Commonly known as: CeleXA   40 mg, Daily      DICLOFENAC SODIUM ER PO   150 mg, Oral, Daily      finasteride 1 MG tablet  Commonly known as: PROPECIA   1 mg, Daily      fluticasone 50 MCG/ACT nasal spray  Commonly known as: FLONASE   2 sprays, Nasal, Daily      hydroCHLOROthiazide 50 MG tablet   50 mg, Oral, 2 Times Daily      loratadine 10 MG tablet  Commonly known as: CLARITIN   10 mg, Daily      magnesium oxide 400 MG tablet  Commonly known as: MAG-OX   400 mg, Daily      minocycline 100 MG capsule  Commonly known as: MINOCIN,DYNACIN   100 mg, Oral, 2 Times Daily      minoxidil 2.5 MG tablet  Commonly known as: LONITEN   2.5 mg, Daily      multivitamin with minerals tablet tablet   1 tablet, Daily      omeprazole 20 MG capsule  Commonly known as: priLOSEC    20 mg, Daily      oxyCODONE-acetaminophen  MG per tablet  Commonly known as: PERCOCET   1 tablet, Oral, Every 8 Hours PRN      Potassium Chloride 40 MEQ/15ML (20%) solution   40 mEq, Daily      tamsulosin 0.4 MG capsule 24 hr capsule  Commonly known as: FLOMAX   1 capsule, 2 Times Daily             Activity: Avoid bending lifting or twisting, brace when up and out of bed, no driving while taking narcotic pain medication, walk 15 minutes 4 times daily  Diet: regular diet  Wound Care: keep wound clean and dry  Other: Contact [facility] office with questions or concerns    Follow-up with Dr Iyer or PA's in 2 weeks.    Electronically signed by Primo Avila PA-C 12:16 CDT 5/3/2025      Electronically signed by Primo Avila PA-C at 05/03/25 5416

## 2025-05-08 ENCOUNTER — READMISSION MANAGEMENT (OUTPATIENT)
Dept: CALL CENTER | Facility: HOSPITAL | Age: 60
End: 2025-05-08
Payer: MEDICAID

## 2025-05-08 NOTE — OUTREACH NOTE
General Surgery Week 1 Survey      Flowsheet Row Responses   [facility] patient discharged from? [place]   Does the patient have one of the following disease processes/diagnoses(primary or secondary)? General Surgery   Week 1 attempt successful? Yes   Call end time 1355   Discharge diagnosis back surgery   Meds reviewed with patient/caregiver? Yes   Does the patient have all medications related to this admission filled (includes all antibiotics, pain medications, etc.) Yes   Is the patient taking all medications as directed (includes completed medication regime)? Yes   Does the patient have a follow up appointment scheduled with their surgeon? Yes   Has the patient kept scheduled appointments due by today? N/A   Psychosocial issues? No   Did the patient receive a copy of their discharge instructions? Yes   Nursing interventions Reviewed instructions with patient   Nursing interventions Nurse provided patient education   Is the patient /caregiver able to teach back basic post-op care? Take showers only when approved by MD-sponge bathe until then, No tub bath, swimming, or hot tub until instructed by MD, Keep incision areas clean,dry and protected, Lifting as instructed by MD in discharge instructions   Is the patient/caregiver able to teach back signs and symptoms of incisional infection? Increased redness, swelling or pain at the incisonal site, Increased drainage or bleeding, Incisional warmth, Pus or odor from incision, Fever   Is the patient/caregiver able to teach back steps to recovery at home? Rest and rebuild strength, gradually increase activity, Eat a well-balance diet   If the patient is a current smoker, are they able to teach back resources for cessation? Not a smoker   Is the patient/caregiver able to teach back the hierarchy of who to call/visit for symptoms/problems? PCP, Specialist, Home health nurse, Urgent Care, ED, 911 Yes   Week 1 call completed? Yes   Is the patient interested in  additional calls from an ambulatory ? No   Would this patient benefit from a Referral to [facility] Social Work? No   Wrap up additional comments Pt states he is doing better, and not having any siatic nerve pain like he was before surgery. Pt does report some stiffness but able to walk without pain. Reviewed AVS/meds with pt. Pt verified post-op appt on 5/13/25. Pt will call surgeon's office about getting some gauze dsgs.   Call end time 1355            Ijeoma KAYE - Registered Nurse

## 2025-05-19 ENCOUNTER — READMISSION MANAGEMENT (OUTPATIENT)
Dept: CALL CENTER | Facility: HOSPITAL | Age: 60
End: 2025-05-19
Payer: MEDICAID

## 2025-05-19 NOTE — OUTREACH NOTE
General Surgery Week 2 Survey      Flowsheet Row Responses   [facility] facility patient discharged from? [place]   Does the patient have one of the following disease processes/diagnoses(primary or secondary)? General Surgery   Week 2 attempt successful? No   Unsuccessful attempts Attempt 1   Revoke Other            Yvette H - Registered Nurse   - - -

## 2025-05-21 ENCOUNTER — ANESTHESIA (OUTPATIENT)
Dept: PERIOP | Facility: HOSPITAL | Age: 60
End: 2025-05-21
Payer: MEDICAID

## 2025-05-21 ENCOUNTER — ANESTHESIA EVENT (OUTPATIENT)
Dept: PERIOP | Facility: HOSPITAL | Age: 60
End: 2025-05-21
Payer: MEDICAID

## 2025-05-21 ENCOUNTER — HOSPITAL ENCOUNTER (INPATIENT)
Facility: HOSPITAL | Age: 60
LOS: 1 days | Discharge: HOME | DRG: 858 | End: 2025-05-22
Attending: ORTHOPAEDIC SURGERY | Admitting: ORTHOPAEDIC SURGERY
Payer: MEDICAID

## 2025-05-21 DIAGNOSIS — M54.16 PAIN, RADICULAR, LUMBAR: ICD-10-CM

## 2025-05-21 PROBLEM — T81.49XA SURGICAL WOUND INFECTION: Status: ACTIVE | Noted: 2025-05-21

## 2025-05-21 PROBLEM — T81.49XA SURGICAL SITE INFECTION: Status: ACTIVE | Noted: 2025-05-21

## 2025-05-21 PROCEDURE — 25810000003 SODIUM CHLORIDE PER 500 ML: Performed by: ORTHOPAEDIC SURGERY

## 2025-05-21 PROCEDURE — 25010000002 CEFAZOLIN PER 500 MG: Performed by: ORTHOPAEDIC SURGERY

## 2025-05-21 PROCEDURE — 25010000002 FENTANYL CITRATE (PF) 50 MCG/ML SOLUTION: Performed by: ANESTHESIOLOGY

## 2025-05-21 PROCEDURE — 25010000002 LIDOCAINE PF 2% 2 % SOLUTION: Performed by: NURSE ANESTHETIST, CERTIFIED REGISTERED

## 2025-05-21 PROCEDURE — 94799 UNLISTED PULMONARY SVC/PX: CPT

## 2025-05-21 PROCEDURE — 25810000003 LACTATED RINGERS PER 1000 ML: Performed by: ORTHOPAEDIC SURGERY

## 2025-05-21 PROCEDURE — 25010000002 HYDROMORPHONE PER 4 MG: Performed by: ORTHOPAEDIC SURGERY

## 2025-05-21 PROCEDURE — 0JB70ZZ EXCISION OF BACK SUBCUTANEOUS TISSUE AND FASCIA, OPEN APPROACH: ICD-10-PCS | Performed by: ORTHOPAEDIC SURGERY

## 2025-05-21 PROCEDURE — 87116 MYCOBACTERIA CULTURE: CPT | Performed by: ORTHOPAEDIC SURGERY

## 2025-05-21 PROCEDURE — 25010000002 FENTANYL CITRATE (PF) 250 MCG/5ML SOLUTION: Performed by: NURSE ANESTHETIST, CERTIFIED REGISTERED

## 2025-05-21 PROCEDURE — G0378 HOSPITAL OBSERVATION PER HR: HCPCS

## 2025-05-21 PROCEDURE — 87186 SC STD MICRODIL/AGAR DIL: CPT | Performed by: ORTHOPAEDIC SURGERY

## 2025-05-21 PROCEDURE — 87075 CULTR BACTERIA EXCEPT BLOOD: CPT | Performed by: ORTHOPAEDIC SURGERY

## 2025-05-21 PROCEDURE — 94640 AIRWAY INHALATION TREATMENT: CPT

## 2025-05-21 PROCEDURE — 25010000002 MIDAZOLAM PER 1MG: Performed by: ANESTHESIOLOGY

## 2025-05-21 PROCEDURE — 87102 FUNGUS ISOLATION CULTURE: CPT | Performed by: ORTHOPAEDIC SURGERY

## 2025-05-21 PROCEDURE — 87147 CULTURE TYPE IMMUNOLOGIC: CPT | Performed by: ORTHOPAEDIC SURGERY

## 2025-05-21 PROCEDURE — 25010000002 ONDANSETRON PER 1 MG: Performed by: NURSE ANESTHETIST, CERTIFIED REGISTERED

## 2025-05-21 PROCEDURE — 87070 CULTURE OTHR SPECIMN AEROBIC: CPT | Performed by: ORTHOPAEDIC SURGERY

## 2025-05-21 PROCEDURE — 25010000002 DROPERIDOL PER 5 MG: Performed by: NURSE ANESTHETIST, CERTIFIED REGISTERED

## 2025-05-21 PROCEDURE — 25010000002 PROPOFOL 10 MG/ML EMULSION: Performed by: NURSE ANESTHETIST, CERTIFIED REGISTERED

## 2025-05-21 PROCEDURE — 25010000002 CEFTRIAXONE PER 250 MG: Performed by: NURSE ANESTHETIST, CERTIFIED REGISTERED

## 2025-05-21 PROCEDURE — 87206 SMEAR FLUORESCENT/ACID STAI: CPT | Performed by: ORTHOPAEDIC SURGERY

## 2025-05-21 PROCEDURE — 87176 TISSUE HOMOGENIZATION CULTR: CPT | Performed by: ORTHOPAEDIC SURGERY

## 2025-05-21 PROCEDURE — 94761 N-INVAS EAR/PLS OXIMETRY MLT: CPT

## 2025-05-21 PROCEDURE — 87205 SMEAR GRAM STAIN: CPT | Performed by: ORTHOPAEDIC SURGERY

## 2025-05-21 PROCEDURE — 25810000003 SODIUM CHLORIDE 0.9 % SOLUTION: Performed by: ORTHOPAEDIC SURGERY

## 2025-05-21 RX ORDER — OXYCODONE HCL 20 MG/1
20 TABLET, FILM COATED, EXTENDED RELEASE ORAL ONCE
Refills: 0 | Status: COMPLETED | OUTPATIENT
Start: 2025-05-21 | End: 2025-05-21

## 2025-05-21 RX ORDER — ACETAMINOPHEN 160 MG/5ML
650 SOLUTION ORAL EVERY 4 HOURS PRN
Status: DISCONTINUED | OUTPATIENT
Start: 2025-05-21 | End: 2025-05-22 | Stop reason: HOSPADM

## 2025-05-21 RX ORDER — FENTANYL CITRATE 50 UG/ML
50 INJECTION, SOLUTION INTRAMUSCULAR; INTRAVENOUS
Status: DISCONTINUED | OUTPATIENT
Start: 2025-05-21 | End: 2025-05-21 | Stop reason: HOSPADM

## 2025-05-21 RX ORDER — OXYCODONE AND ACETAMINOPHEN 10; 325 MG/1; MG/1
1 TABLET ORAL EVERY 4 HOURS PRN
Status: DISCONTINUED | OUTPATIENT
Start: 2025-05-21 | End: 2025-05-22 | Stop reason: HOSPADM

## 2025-05-21 RX ORDER — SODIUM CHLORIDE 9 MG/ML
INJECTION, SOLUTION INTRAVENOUS AS NEEDED
Status: DISCONTINUED | OUTPATIENT
Start: 2025-05-21 | End: 2025-05-21 | Stop reason: HOSPADM

## 2025-05-21 RX ORDER — ALBUTEROL SULFATE 0.83 MG/ML
2.5 SOLUTION RESPIRATORY (INHALATION) EVERY 4 HOURS PRN
Status: DISCONTINUED | OUTPATIENT
Start: 2025-05-21 | End: 2025-05-22 | Stop reason: HOSPADM

## 2025-05-21 RX ORDER — DROPERIDOL 2.5 MG/ML
INJECTION, SOLUTION INTRAMUSCULAR; INTRAVENOUS AS NEEDED
Status: DISCONTINUED | OUTPATIENT
Start: 2025-05-21 | End: 2025-05-21 | Stop reason: SURG

## 2025-05-21 RX ORDER — SODIUM CHLORIDE 0.9 % (FLUSH) 0.9 %
3 SYRINGE (ML) INJECTION EVERY 12 HOURS SCHEDULED
Status: DISCONTINUED | OUTPATIENT
Start: 2025-05-21 | End: 2025-05-22 | Stop reason: HOSPADM

## 2025-05-21 RX ORDER — PROPOFOL 10 MG/ML
VIAL (ML) INTRAVENOUS AS NEEDED
Status: DISCONTINUED | OUTPATIENT
Start: 2025-05-21 | End: 2025-05-21 | Stop reason: SURG

## 2025-05-21 RX ORDER — SODIUM CHLORIDE 0.9 % (FLUSH) 0.9 %
3-10 SYRINGE (ML) INJECTION AS NEEDED
Status: DISCONTINUED | OUTPATIENT
Start: 2025-05-21 | End: 2025-05-21 | Stop reason: HOSPADM

## 2025-05-21 RX ORDER — FLUMAZENIL 0.1 MG/ML
0.2 INJECTION INTRAVENOUS AS NEEDED
Status: DISCONTINUED | OUTPATIENT
Start: 2025-05-21 | End: 2025-05-21 | Stop reason: HOSPADM

## 2025-05-21 RX ORDER — HYDROMORPHONE HYDROCHLORIDE 1 MG/ML
0.5 INJECTION, SOLUTION INTRAMUSCULAR; INTRAVENOUS; SUBCUTANEOUS
Status: DISCONTINUED | OUTPATIENT
Start: 2025-05-21 | End: 2025-05-21 | Stop reason: HOSPADM

## 2025-05-21 RX ORDER — SODIUM CHLORIDE, SODIUM LACTATE, POTASSIUM CHLORIDE, CALCIUM CHLORIDE 600; 310; 30; 20 MG/100ML; MG/100ML; MG/100ML; MG/100ML
20 INJECTION, SOLUTION INTRAVENOUS CONTINUOUS
Status: DISCONTINUED | OUTPATIENT
Start: 2025-05-21 | End: 2025-05-21

## 2025-05-21 RX ORDER — ACETAMINOPHEN 325 MG/1
650 TABLET ORAL EVERY 4 HOURS PRN
Status: DISCONTINUED | OUTPATIENT
Start: 2025-05-21 | End: 2025-05-22 | Stop reason: HOSPADM

## 2025-05-21 RX ORDER — SODIUM CHLORIDE 9 MG/ML
100 INJECTION, SOLUTION INTRAVENOUS CONTINUOUS
Status: DISCONTINUED | OUTPATIENT
Start: 2025-05-21 | End: 2025-05-22 | Stop reason: HOSPADM

## 2025-05-21 RX ORDER — BISACODYL 10 MG
10 SUPPOSITORY, RECTAL RECTAL DAILY PRN
Status: DISCONTINUED | OUTPATIENT
Start: 2025-05-21 | End: 2025-05-22 | Stop reason: HOSPADM

## 2025-05-21 RX ORDER — ACETAMINOPHEN 650 MG/1
650 SUPPOSITORY RECTAL EVERY 4 HOURS PRN
Status: DISCONTINUED | OUTPATIENT
Start: 2025-05-21 | End: 2025-05-22 | Stop reason: HOSPADM

## 2025-05-21 RX ORDER — SODIUM CHLORIDE 9 MG/ML
40 INJECTION, SOLUTION INTRAVENOUS AS NEEDED
Status: DISCONTINUED | OUTPATIENT
Start: 2025-05-21 | End: 2025-05-22 | Stop reason: HOSPADM

## 2025-05-21 RX ORDER — SODIUM CHLORIDE 9 MG/ML
40 INJECTION, SOLUTION INTRAVENOUS AS NEEDED
Status: DISCONTINUED | OUTPATIENT
Start: 2025-05-21 | End: 2025-05-21 | Stop reason: HOSPADM

## 2025-05-21 RX ORDER — CITALOPRAM HYDROBROMIDE 20 MG/1
40 TABLET ORAL DAILY
Status: DISCONTINUED | OUTPATIENT
Start: 2025-05-21 | End: 2025-05-22 | Stop reason: HOSPADM

## 2025-05-21 RX ORDER — SODIUM CHLORIDE 0.9 % (FLUSH) 0.9 %
3 SYRINGE (ML) INJECTION AS NEEDED
Status: DISCONTINUED | OUTPATIENT
Start: 2025-05-21 | End: 2025-05-21 | Stop reason: HOSPADM

## 2025-05-21 RX ORDER — ONDANSETRON 4 MG/1
4 TABLET, ORALLY DISINTEGRATING ORAL EVERY 6 HOURS PRN
Status: DISCONTINUED | OUTPATIENT
Start: 2025-05-21 | End: 2025-05-22 | Stop reason: HOSPADM

## 2025-05-21 RX ORDER — ALBUTEROL SULFATE 90 UG/1
2 INHALANT RESPIRATORY (INHALATION) EVERY 4 HOURS PRN
Status: DISCONTINUED | OUTPATIENT
Start: 2025-05-21 | End: 2025-05-21 | Stop reason: CLARIF

## 2025-05-21 RX ORDER — OXYCODONE AND ACETAMINOPHEN 7.5; 325 MG/1; MG/1
1 TABLET ORAL EVERY 4 HOURS PRN
Status: DISCONTINUED | OUTPATIENT
Start: 2025-05-21 | End: 2025-05-22 | Stop reason: HOSPADM

## 2025-05-21 RX ORDER — MIDAZOLAM HYDROCHLORIDE 2 MG/2ML
2 INJECTION, SOLUTION INTRAMUSCULAR; INTRAVENOUS
Status: DISCONTINUED | OUTPATIENT
Start: 2025-05-21 | End: 2025-05-21 | Stop reason: HOSPADM

## 2025-05-21 RX ORDER — SPIRONOLACTONE 25 MG/1
50 TABLET ORAL 2 TIMES DAILY
Status: DISCONTINUED | OUTPATIENT
Start: 2025-05-21 | End: 2025-05-22 | Stop reason: HOSPADM

## 2025-05-21 RX ORDER — ONDANSETRON 2 MG/ML
4 INJECTION INTRAMUSCULAR; INTRAVENOUS EVERY 6 HOURS PRN
Status: DISCONTINUED | OUTPATIENT
Start: 2025-05-21 | End: 2025-05-22 | Stop reason: HOSPADM

## 2025-05-21 RX ORDER — MAGNESIUM HYDROXIDE 1200 MG/15ML
LIQUID ORAL AS NEEDED
Status: DISCONTINUED | OUTPATIENT
Start: 2025-05-21 | End: 2025-05-21 | Stop reason: HOSPADM

## 2025-05-21 RX ORDER — LABETALOL HYDROCHLORIDE 5 MG/ML
5 INJECTION, SOLUTION INTRAVENOUS
Status: DISCONTINUED | OUTPATIENT
Start: 2025-05-21 | End: 2025-05-21 | Stop reason: HOSPADM

## 2025-05-21 RX ORDER — SODIUM CHLORIDE 0.9 % (FLUSH) 0.9 %
3 SYRINGE (ML) INJECTION EVERY 12 HOURS SCHEDULED
Status: DISCONTINUED | OUTPATIENT
Start: 2025-05-21 | End: 2025-05-21 | Stop reason: HOSPADM

## 2025-05-21 RX ORDER — IBUPROFEN 600 MG/1
600 TABLET, FILM COATED ORAL EVERY 6 HOURS PRN
Status: DISCONTINUED | OUTPATIENT
Start: 2025-05-21 | End: 2025-05-21 | Stop reason: HOSPADM

## 2025-05-21 RX ORDER — BUDESONIDE AND FORMOTEROL FUMARATE DIHYDRATE 160; 4.5 UG/1; UG/1
2 AEROSOL RESPIRATORY (INHALATION)
Status: DISCONTINUED | OUTPATIENT
Start: 2025-05-21 | End: 2025-05-22 | Stop reason: HOSPADM

## 2025-05-21 RX ORDER — AMOXICILLIN 250 MG
2 CAPSULE ORAL 2 TIMES DAILY PRN
Status: DISCONTINUED | OUTPATIENT
Start: 2025-05-21 | End: 2025-05-22 | Stop reason: HOSPADM

## 2025-05-21 RX ORDER — POTASSIUM CHLORIDE 20MEQ/15ML
40 LIQUID (ML) ORAL DAILY
Status: DISCONTINUED | OUTPATIENT
Start: 2025-05-21 | End: 2025-05-22 | Stop reason: HOSPADM

## 2025-05-21 RX ORDER — METHOCARBAMOL 500 MG/1
1000 TABLET, FILM COATED ORAL 4 TIMES DAILY PRN
Status: DISCONTINUED | OUTPATIENT
Start: 2025-05-21 | End: 2025-05-22 | Stop reason: HOSPADM

## 2025-05-21 RX ORDER — OXYCODONE AND ACETAMINOPHEN 10; 325 MG/1; MG/1
1 TABLET ORAL EVERY 4 HOURS PRN
Status: DISCONTINUED | OUTPATIENT
Start: 2025-05-21 | End: 2025-05-21 | Stop reason: HOSPADM

## 2025-05-21 RX ORDER — LIDOCAINE HYDROCHLORIDE 20 MG/ML
INJECTION, SOLUTION EPIDURAL; INFILTRATION; INTRACAUDAL; PERINEURAL AS NEEDED
Status: DISCONTINUED | OUTPATIENT
Start: 2025-05-21 | End: 2025-05-21 | Stop reason: SURG

## 2025-05-21 RX ORDER — POLYETHYLENE GLYCOL 3350 17 G/17G
17 POWDER, FOR SOLUTION ORAL DAILY PRN
Status: DISCONTINUED | OUTPATIENT
Start: 2025-05-21 | End: 2025-05-22 | Stop reason: HOSPADM

## 2025-05-21 RX ORDER — ONDANSETRON 2 MG/ML
INJECTION INTRAMUSCULAR; INTRAVENOUS AS NEEDED
Status: DISCONTINUED | OUTPATIENT
Start: 2025-05-21 | End: 2025-05-21 | Stop reason: SURG

## 2025-05-21 RX ORDER — DEXMEDETOMIDINE HYDROCHLORIDE 4 UG/ML
INJECTION, SOLUTION INTRAVENOUS AS NEEDED
Status: DISCONTINUED | OUTPATIENT
Start: 2025-05-21 | End: 2025-05-21 | Stop reason: SURG

## 2025-05-21 RX ORDER — SODIUM CHLORIDE, SODIUM LACTATE, POTASSIUM CHLORIDE, CALCIUM CHLORIDE 600; 310; 30; 20 MG/100ML; MG/100ML; MG/100ML; MG/100ML
100 INJECTION, SOLUTION INTRAVENOUS CONTINUOUS
Status: DISCONTINUED | OUTPATIENT
Start: 2025-05-21 | End: 2025-05-21

## 2025-05-21 RX ORDER — HYDROMORPHONE HYDROCHLORIDE 1 MG/ML
0.5 INJECTION, SOLUTION INTRAMUSCULAR; INTRAVENOUS; SUBCUTANEOUS
Status: DISCONTINUED | OUTPATIENT
Start: 2025-05-21 | End: 2025-05-22 | Stop reason: HOSPADM

## 2025-05-21 RX ORDER — PANTOPRAZOLE SODIUM 40 MG/1
40 TABLET, DELAYED RELEASE ORAL
Status: DISCONTINUED | OUTPATIENT
Start: 2025-05-22 | End: 2025-05-22 | Stop reason: HOSPADM

## 2025-05-21 RX ORDER — ACETAMINOPHEN 500 MG
1000 TABLET ORAL ONCE
Status: COMPLETED | OUTPATIENT
Start: 2025-05-21 | End: 2025-05-21

## 2025-05-21 RX ORDER — LIDOCAINE HYDROCHLORIDE 10 MG/ML
0.5 INJECTION, SOLUTION EPIDURAL; INFILTRATION; INTRACAUDAL; PERINEURAL ONCE AS NEEDED
Status: DISCONTINUED | OUTPATIENT
Start: 2025-05-21 | End: 2025-05-21 | Stop reason: HOSPADM

## 2025-05-21 RX ORDER — BISACODYL 5 MG/1
5 TABLET, DELAYED RELEASE ORAL DAILY PRN
Status: DISCONTINUED | OUTPATIENT
Start: 2025-05-21 | End: 2025-05-22 | Stop reason: HOSPADM

## 2025-05-21 RX ORDER — ONDANSETRON 2 MG/ML
4 INJECTION INTRAMUSCULAR; INTRAVENOUS
Status: DISCONTINUED | OUTPATIENT
Start: 2025-05-21 | End: 2025-05-21 | Stop reason: HOSPADM

## 2025-05-21 RX ORDER — TAMSULOSIN HYDROCHLORIDE 0.4 MG/1
0.8 CAPSULE ORAL DAILY
Status: DISCONTINUED | OUTPATIENT
Start: 2025-05-21 | End: 2025-05-22 | Stop reason: HOSPADM

## 2025-05-21 RX ORDER — SODIUM CHLORIDE 0.9 % (FLUSH) 0.9 %
10 SYRINGE (ML) INJECTION AS NEEDED
Status: DISCONTINUED | OUTPATIENT
Start: 2025-05-21 | End: 2025-05-22 | Stop reason: HOSPADM

## 2025-05-21 RX ORDER — NALOXONE HCL 0.4 MG/ML
0.04 VIAL (ML) INJECTION AS NEEDED
Status: DISCONTINUED | OUTPATIENT
Start: 2025-05-21 | End: 2025-05-21 | Stop reason: HOSPADM

## 2025-05-21 RX ORDER — FENTANYL CITRATE 50 UG/ML
INJECTION, SOLUTION INTRAMUSCULAR; INTRAVENOUS AS NEEDED
Status: DISCONTINUED | OUTPATIENT
Start: 2025-05-21 | End: 2025-05-21 | Stop reason: SURG

## 2025-05-21 RX ORDER — CEFTRIAXONE 1 G/1
INJECTION, POWDER, FOR SOLUTION INTRAMUSCULAR; INTRAVENOUS AS NEEDED
Status: DISCONTINUED | OUTPATIENT
Start: 2025-05-21 | End: 2025-05-21 | Stop reason: SURG

## 2025-05-21 RX ORDER — NALOXONE HCL 0.4 MG/ML
0.4 VIAL (ML) INJECTION
Status: DISCONTINUED | OUTPATIENT
Start: 2025-05-21 | End: 2025-05-22 | Stop reason: HOSPADM

## 2025-05-21 RX ORDER — FLUTICASONE PROPIONATE 50 MCG
2 SPRAY, SUSPENSION (ML) NASAL DAILY
Status: DISCONTINUED | OUTPATIENT
Start: 2025-05-22 | End: 2025-05-22 | Stop reason: HOSPADM

## 2025-05-21 RX ORDER — SPIRONOLACTONE 50 MG/1
50 TABLET, FILM COATED ORAL 2 TIMES DAILY
COMMUNITY

## 2025-05-21 RX ORDER — CETIRIZINE HYDROCHLORIDE 10 MG/1
10 TABLET ORAL DAILY
Status: DISCONTINUED | OUTPATIENT
Start: 2025-05-21 | End: 2025-05-22 | Stop reason: HOSPADM

## 2025-05-21 RX ADMIN — TAMSULOSIN HYDROCHLORIDE 0.8 MG: 0.4 CAPSULE ORAL at 16:11

## 2025-05-21 RX ADMIN — SODIUM CHLORIDE, POTASSIUM CHLORIDE, SODIUM LACTATE AND CALCIUM CHLORIDE 20 ML/HR: 600; 310; 30; 20 INJECTION, SOLUTION INTRAVENOUS at 10:17

## 2025-05-21 RX ADMIN — OXYCODONE AND ACETAMINOPHEN 1 TABLET: 325; 10 TABLET ORAL at 18:37

## 2025-05-21 RX ADMIN — SPIRONOLACTONE 50 MG: 25 TABLET ORAL at 20:20

## 2025-05-21 RX ADMIN — CETIRIZINE HYDROCHLORIDE 10 MG: 10 TABLET, FILM COATED ORAL at 16:11

## 2025-05-21 RX ADMIN — OXYCODONE HYDROCHLORIDE 20 MG: 20 TABLET, FILM COATED, EXTENDED RELEASE ORAL at 10:24

## 2025-05-21 RX ADMIN — CEFAZOLIN 2 G: 2 INJECTION, POWDER, FOR SOLUTION INTRAMUSCULAR; INTRAVENOUS at 12:24

## 2025-05-21 RX ADMIN — ONDANSETRON 4 MG: 2 INJECTION INTRAMUSCULAR; INTRAVENOUS at 12:24

## 2025-05-21 RX ADMIN — OXYCODONE AND ACETAMINOPHEN 1 TABLET: 325; 10 TABLET ORAL at 22:56

## 2025-05-21 RX ADMIN — LIDOCAINE HYDROCHLORIDE 200 MG: 20 INJECTION, SOLUTION EPIDURAL; INFILTRATION; INTRACAUDAL; PERINEURAL at 12:25

## 2025-05-21 RX ADMIN — HYDROMORPHONE HYDROCHLORIDE 0.5 MG: 1 INJECTION, SOLUTION INTRAMUSCULAR; INTRAVENOUS; SUBCUTANEOUS at 22:07

## 2025-05-21 RX ADMIN — Medication 3 ML: at 20:20

## 2025-05-21 RX ADMIN — HYDROMORPHONE HYDROCHLORIDE 0.5 MG: 1 INJECTION, SOLUTION INTRAMUSCULAR; INTRAVENOUS; SUBCUTANEOUS at 15:21

## 2025-05-21 RX ADMIN — DROPERIDOL 1.25 MG: 2.5 INJECTION, SOLUTION INTRAMUSCULAR; INTRAVENOUS at 12:24

## 2025-05-21 RX ADMIN — BUDESONIDE AND FORMOTEROL FUMARATE DIHYDRATE 2 PUFF: 160; 4.5 AEROSOL RESPIRATORY (INHALATION) at 19:50

## 2025-05-21 RX ADMIN — ACETAMINOPHEN 1000 MG: 500 TABLET, FILM COATED ORAL at 10:51

## 2025-05-21 RX ADMIN — PROPOFOL 200 MG: 10 INJECTION, EMULSION INTRAVENOUS at 12:25

## 2025-05-21 RX ADMIN — FENTANYL CITRATE 50 MCG: 50 INJECTION, SOLUTION INTRAMUSCULAR; INTRAVENOUS at 14:08

## 2025-05-21 RX ADMIN — Medication 400 MG: at 16:11

## 2025-05-21 RX ADMIN — DEXMEDETOMIDINE HYDROCHLORIDE 20 MCG: 4 INJECTION, SOLUTION INTRAVENOUS at 12:24

## 2025-05-21 RX ADMIN — OXYCODONE AND ACETAMINOPHEN 1 TABLET: 325; 10 TABLET ORAL at 14:03

## 2025-05-21 RX ADMIN — SODIUM CHLORIDE 100 ML/HR: 9 INJECTION, SOLUTION INTRAVENOUS at 15:26

## 2025-05-21 RX ADMIN — MIDAZOLAM HYDROCHLORIDE 2 MG: 1 INJECTION, SOLUTION INTRAMUSCULAR; INTRAVENOUS at 10:51

## 2025-05-21 RX ADMIN — CEFTRIAXONE 2 G: 1 INJECTION, POWDER, FOR SOLUTION INTRAMUSCULAR; INTRAVENOUS at 13:12

## 2025-05-21 RX ADMIN — FENTANYL CITRATE 250 MCG: 50 INJECTION, SOLUTION INTRAMUSCULAR; INTRAVENOUS at 12:25

## 2025-05-21 RX ADMIN — POTASSIUM CHLORIDE 40 MEQ: 20 SOLUTION ORAL at 16:11

## 2025-05-21 RX ADMIN — CITALOPRAM HYDROBROMIDE 40 MG: 20 TABLET ORAL at 16:11

## 2025-05-21 NOTE — PLAN OF CARE
Goal Outcome Evaluation:      Patient A&Ox4, VSS, RA, IV infusing, 2nd IV salin locked, Patient up with standby assist with LSO brace on. VOIDS w/o difficulty. Medicated for pain as needed. Dressing to lower back dry and intact. All safety maintained and call light in reach.

## 2025-05-21 NOTE — ANESTHESIA PREPROCEDURE EVALUATION
Anesthesia Evaluation     Patient summary reviewed and Nursing notes reviewed   no history of anesthetic complications:   NPO Solid Status: > 8 hours  NPO Liquid Status: > 8 hours           Airway   Mallampati: I  TM distance: >3 FB  No difficulty expected  Dental      Pulmonary    (+) asthma (as child),sleep apnea  Cardiovascular   Exercise tolerance: good (4-7 METS)    ECG reviewed    (+) hypertension  (-) CAD      Neuro/Psych  (-) seizures, TIA, CVA  GI/Hepatic/Renal/Endo    (+) obesity, GERD  (-) liver disease, no renal disease, diabetes    Musculoskeletal     Abdominal    Substance History      OB/GYN          Other   arthritis,                     Anesthesia Plan    ASA 2     general     intravenous induction     Anesthetic plan, risks, benefits, and alternatives have been provided, discussed and informed consent has been obtained with: patient.      CODE STATUS:

## 2025-05-21 NOTE — ANESTHESIA PROCEDURE NOTES
Airway  Reason: elective    Date/Time: 5/21/2025 12:25 PM  Airway not difficult    General Information and Staff    Patient location during procedure: OR  CRNA/CAA: Delroy Jamison CRNA    Indications and Patient Condition  Indications for airway management: airway protection    Preoxygenated: yes  MILS maintained throughout    Mask difficulty assessment: 1 - vent by mask    Final Airway Details    Final airway type: endotracheal airway      Successful airway: ETT  Cuffed: yes   Successful intubation technique: direct laryngoscopy  Endotracheal tube insertion site: oral  Blade: Fernández  Blade size: 2  ETT size (mm): 8.0  Cormack-Lehane Classification: grade I - full view of glottis  Placement verified by: chest auscultation and capnometry   Cuff volume (mL): 5  Measured from: lips  ETT/EBT  to lips (cm): 20  Number of attempts at approach: 1  Assessment: lips, teeth, and gum same as pre-op and atraumatic intubation

## 2025-05-21 NOTE — ANESTHESIA POSTPROCEDURE EVALUATION
"Patient: Kurtis Valladares    Procedure Summary       Date: 05/21/25 Room / Location: [facility] OR  /  [facility] OR    Anesthesia Start: 1224 Anesthesia Stop: 1355    Procedure: INCISION AND DRAINAGE LUMBAR WOUND (Spine Lumbar) Diagnosis: (M54.16)    Surgeons: PAWEL Iyer MD Provider: Delroy Jamison CRNA    Anesthesia Type: general ASA Status: 2            Anesthesia Type: general    Vitals  Vitals Value Taken Time   /79 05/21/25 13:54   Temp     Pulse 69 05/21/25 13:55   Resp     SpO2 100 % 05/21/25 13:55   Vitals shown include unfiled device data.        Post Anesthesia Care and Evaluation    Patient location during evaluation: PACU  Patient participation: complete - patient participated  Level of consciousness: awake and alert  Pain management: adequate    Airway patency: patent  Anesthetic complications: No anesthetic complications    Cardiovascular status: acceptable  Respiratory status: acceptable  Hydration status: acceptable    Comments: Blood pressure 124/67, pulse 65, temperature 97.5 °F (36.4 °C), temperature source Temporal, resp. rate 16, height 183 cm (72.05\"), weight 106 kg (234 lb 2.1 oz), SpO2 100%.    Pt discharged from PACU based on nettie score >8    "

## 2025-05-21 NOTE — OP NOTE
INCISION AND DRAINAGE POSTERIOR SPINE LUMBAR/SACRAL  Procedure Note    Kurtis Valladares  5/21/2025    Pre-op Diagnosis:     1. Status post hemilaminectomy, decompression, partial facetectomy decompression, bilateral L3-4, left L5-S1, 11/13/2023    2. Status post irrigation and debridement posterior lumbar wound, 12/13/2023    3. Status post revision irrigation and debridement posterior lumbar wound, 02/16/2024   4.  Status post anterior decompression, ALIF with instrumentation L5-S1, 4/28/2025  5.  Status post left LLIF with instrumentation L3-4, 4/30/2025  6.  Status post PSF with instrumentation L3-S1, 5/2/2025  7.  Left posterior lumbar wound infection    Post-op Diagnosis:    same    Procedure/CPT® Codes:    1.  Irrigation and debridement left posterior lumbar wound  2.  Revision posterior lumbar wound closure    Anesthesia: General    Surgeon: FILIBERTO Iyer MD    Assistant: Huang Avila PA-C    Estimated Blood Loss: Minimal    Complications: None    Condition: Stable to PACU.    Indications:    The patient is a 59-year-old who sees Shira Worthington NP for medical issues.  He originally presented to the office with chronic low back pain along with mainly left leg radiculopathy and symptoms consistent with neurogenic claudication.  While we briefly discussed a decompression and fusion procedure, we elected to proceed with a smaller decompression procedure alone at L3-4 and L5-S1.  This procedure was performed on 11/13/2023.  Unfortunately, the surgery was complicated by a draining infected wound that required two irrigation and debridement procedures.  He presented back to the office with residual chronic low back pain as well as residual leg radiculopathy and symptoms consistent with neurogenic claudication.  Repeat imaging studies revealed degenerative disc disease from L3-S1 with facet arthropathy from L2-S1 and congenital short pedicles syndrome.  He was also noted to have a degenerative scoliosis  concave to the right from T12-L4 and concave to the left at L5-S1.  He was likely to have a autofusion at L4-5.  These findings created severe foraminal stenosis on the right side of L3-4 on the left side of L5-S1.     He was taken to surgery for a staged fusion procedure from L3-S1 involving ALIF at L5-S1 and a lateral fusion of L3-4 followed by posterior spinal fusion with instrumentation spanning L3-S1.  The last procedure was performed on 5/2/2025.  Unfortunately, he presented back to the office with purulent wound drainage from his left sided posterior lumbar wound.  Plans are made to take him back to surgery for an irrigation and debridement with complex wound closure.    Risks, benefits, and complications of surgery were again discussed with the patient.  He appeared well-informed and wished to proceed.    Operative Procedure:    After obtaining informed consent and verifying the correct operative site, the patient was brought to the operating room. A general anesthetic was provided by the anesthesia service with the assistance of an endotracheal tube. Once this was appropriately positioned and secured, the patient was carefully rotated prone onto a Jose frame. All bony processes were well-padded. The lumbar region was prepped and draped in usual sterile fashion. A surgical timeout was taken to confirm this was the correct patient, we were working at the correct level, and that preoperative antibiotics were given in a timely fashion.    A 10 blade scalpel was used to reopen the left sided posterior lumbar Wiltsie incision.  We immediately encountered purulent fluid which was cultured with swabs.  The fluid was suctioned and stitches were removed from the subcutaneous layer with a pair of pickups and 15 blade scalpel.  There was some necrotic appearing adipose tissue which was removed using curettes and a rongeur.  Some of this tissue was also sent for culture.  I used curettes to debride the entire base of  the wound and the sidewalls down to clean tissue.  The fascial stitches were noted to be intact with no obvious connection with the infection fluid and the previously placed instrumentation.  I did not feel that opening the fascia would be necessary.  I then used copious liters of sterile saline solution to irrigate the wound with pulsatile lavage.  Again I used curettes to debride some more loose material.  At the end of the irrigation and debridement, the wound appeared to be clean and appropriate for revision closure.  Again I reevaluated the fascia and it was felt to be intact with no obvious infection.  I believe the infection was mainly in the superficial and subcutaneous areas.    Wound closure was then accomplished by reapproximating the deep tissues and fascia with an 0 PDS stitch.  Immediate subcutaneous tissues were closed using a 2-0 PDS stitch and skin closure was accomplished using a running 2-0 nylon stitch.  The incision was then washed and sterilely dressed with a Bioclusive dressing.    The patient was then carefully rotated supine onto a hospital gurney, extubated, and sent to the recovery room in good stable condition.  The patient tolerated the procedure well.  There were no complications.  We estimated blood loss to be minimal.    Huang Avila PA-C provided critical assistance during the procedure.  His assistance was medically necessary in order to allow the procedure to occur in the most safe and efficient manner.    FILIBERTO Iyer MD     Date: 5/21/2025  Time: 13:50 CDT

## 2025-05-22 VITALS
BODY MASS INDEX: 31.71 KG/M2 | HEIGHT: 72 IN | HEART RATE: 77 BPM | DIASTOLIC BLOOD PRESSURE: 44 MMHG | OXYGEN SATURATION: 99 % | SYSTOLIC BLOOD PRESSURE: 105 MMHG | RESPIRATION RATE: 18 BRPM | WEIGHT: 234.13 LBS | TEMPERATURE: 98.5 F

## 2025-05-22 LAB
ANION GAP SERPL CALCULATED.3IONS-SCNC: 11 MMOL/L (ref 5–15)
BASOPHILS # BLD AUTO: 0.04 10*3/MM3 (ref 0–0.2)
BASOPHILS NFR BLD AUTO: 0.3 % (ref 0–1.5)
BUN SERPL-MCNC: 9 MG/DL (ref 6–20)
BUN/CREAT SERPL: 9 (ref 7–25)
CALCIUM SPEC-SCNC: 8.5 MG/DL (ref 8.6–10.5)
CHLORIDE SERPL-SCNC: 102 MMOL/L (ref 98–107)
CO2 SERPL-SCNC: 25 MMOL/L (ref 22–29)
CREAT SERPL-MCNC: 1 MG/DL (ref 0.76–1.27)
DEPRECATED RDW RBC AUTO: 42.6 FL (ref 37–54)
EGFRCR SERPLBLD CKD-EPI 2021: 87.2 ML/MIN/1.73
EOSINOPHIL # BLD AUTO: 0.15 10*3/MM3 (ref 0–0.4)
EOSINOPHIL NFR BLD AUTO: 1.2 % (ref 0.3–6.2)
ERYTHROCYTE [DISTWIDTH] IN BLOOD BY AUTOMATED COUNT: 13 % (ref 12.3–15.4)
GLUCOSE SERPL-MCNC: 89 MG/DL (ref 65–99)
HCT VFR BLD AUTO: 28.8 % (ref 37.5–51)
HGB BLD-MCNC: 9.1 G/DL (ref 13–17.7)
IMM GRANULOCYTES # BLD AUTO: 0.12 10*3/MM3 (ref 0–0.05)
IMM GRANULOCYTES NFR BLD AUTO: 0.9 % (ref 0–0.5)
LYMPHOCYTES # BLD AUTO: 1 10*3/MM3 (ref 0.7–3.1)
LYMPHOCYTES NFR BLD AUTO: 7.9 % (ref 19.6–45.3)
MCH RBC QN AUTO: 28 PG (ref 26.6–33)
MCHC RBC AUTO-ENTMCNC: 31.6 G/DL (ref 31.5–35.7)
MCV RBC AUTO: 88.6 FL (ref 79–97)
MONOCYTES # BLD AUTO: 0.69 10*3/MM3 (ref 0.1–0.9)
MONOCYTES NFR BLD AUTO: 5.4 % (ref 5–12)
NEUTROPHILS NFR BLD AUTO: 10.69 10*3/MM3 (ref 1.7–7)
NEUTROPHILS NFR BLD AUTO: 84.3 % (ref 42.7–76)
NRBC BLD AUTO-RTO: 0 /100 WBC (ref 0–0.2)
PLATELET # BLD AUTO: 387 10*3/MM3 (ref 140–450)
PMV BLD AUTO: 8.7 FL (ref 6–12)
POTASSIUM SERPL-SCNC: 4.6 MMOL/L (ref 3.5–5.2)
RBC # BLD AUTO: 3.25 10*6/MM3 (ref 4.14–5.8)
SODIUM SERPL-SCNC: 138 MMOL/L (ref 136–145)
WBC NRBC COR # BLD AUTO: 12.69 10*3/MM3 (ref 3.4–10.8)

## 2025-05-22 PROCEDURE — 85025 COMPLETE CBC W/AUTO DIFF WBC: CPT | Performed by: ORTHOPAEDIC SURGERY

## 2025-05-22 PROCEDURE — 94799 UNLISTED PULMONARY SVC/PX: CPT

## 2025-05-22 PROCEDURE — 25010000002 VANCOMYCIN 1.5-0.9 GM/500ML-% SOLUTION: Performed by: PHYSICIAN ASSISTANT

## 2025-05-22 PROCEDURE — 80048 BASIC METABOLIC PNL TOTAL CA: CPT | Performed by: ORTHOPAEDIC SURGERY

## 2025-05-22 RX ORDER — VANCOMYCIN/0.9 % SOD CHLORIDE 1.5G/250ML
1500 PLASTIC BAG, INJECTION (ML) INTRAVENOUS ONCE
Status: COMPLETED | OUTPATIENT
Start: 2025-05-22 | End: 2025-05-22

## 2025-05-22 RX ADMIN — POTASSIUM CHLORIDE 40 MEQ: 20 SOLUTION ORAL at 09:09

## 2025-05-22 RX ADMIN — OXYCODONE AND ACETAMINOPHEN 1 TABLET: 325; 10 TABLET ORAL at 07:44

## 2025-05-22 RX ADMIN — PANTOPRAZOLE SODIUM 40 MG: 40 TABLET, DELAYED RELEASE ORAL at 05:08

## 2025-05-22 RX ADMIN — METHOCARBAMOL TABLETS 1000 MG: 500 TABLET, COATED ORAL at 06:38

## 2025-05-22 RX ADMIN — Medication 400 MG: at 09:09

## 2025-05-22 RX ADMIN — FLUTICASONE PROPIONATE 2 SPRAY: 50 SPRAY, METERED NASAL at 09:09

## 2025-05-22 RX ADMIN — Medication 3 ML: at 09:00

## 2025-05-22 RX ADMIN — OXYCODONE AND ACETAMINOPHEN 1 TABLET: 325; 10 TABLET ORAL at 03:25

## 2025-05-22 RX ADMIN — Medication 1500 MG: at 09:09

## 2025-05-22 RX ADMIN — BUDESONIDE AND FORMOTEROL FUMARATE DIHYDRATE 2 PUFF: 160; 4.5 AEROSOL RESPIRATORY (INHALATION) at 06:41

## 2025-05-22 RX ADMIN — CITALOPRAM HYDROBROMIDE 40 MG: 20 TABLET ORAL at 09:09

## 2025-05-22 RX ADMIN — OXYCODONE AND ACETAMINOPHEN 1 TABLET: 325; 10 TABLET ORAL at 12:23

## 2025-05-22 RX ADMIN — CETIRIZINE HYDROCHLORIDE 10 MG: 10 TABLET, FILM COATED ORAL at 09:09

## 2025-05-22 RX ADMIN — SPIRONOLACTONE 50 MG: 25 TABLET ORAL at 09:09

## 2025-05-22 RX ADMIN — TAMSULOSIN HYDROCHLORIDE 0.8 MG: 0.4 CAPSULE ORAL at 09:09

## 2025-05-22 NOTE — PROGRESS NOTES
Mr. Valladares is doing well.  Requesting to go home.  Posterior lumbar dressing clean dry and intact.  Normal motor and sensory function in both lower extremities.  Plan discharge home today on oral antibiotics.  We will follow the cultures and adjust as necessary.

## 2025-05-22 NOTE — DISCHARGE SUMMARY
[facility]  Primo Avila PA-C  DISCHARGE SUMMARY  Patient ID:  Kurtis Valladares  [id number]  59 y.o.  1965    Admit date: 5/21/2025    Discharge date and time: 5/22/2025    Admitting Physician: PAWEL Iyer MD     Indication for Admission: Surgical wound infection with drainage    Admission Diagnoses: Surgical wound infection [T81.49XA]    Discharge Diagnoses: Surgical wound infection [T81.49XA]    Procedures: I & D lumbar wound    Problem List:   Surgical wound infection    Surgical site infection      Consults: none    Admission Condition: fair    Discharged Condition: stable    Hospital Course: procedure completed without incident     Disposition: home    Patient Instructions:      Discharge Medications        Continue These Medications        Instructions Start Date   Advair Diskus 100-50 MCG/ACT DISKUS  Generic drug: Fluticasone-Salmeterol   1 puff, 2 Times Daily - RT      albuterol sulfate  (90 Base) MCG/ACT inhaler  Commonly known as: PROVENTIL HFA;VENTOLIN HFA;PROAIR HFA   2 puffs, As Needed      citalopram 40 MG tablet  Commonly known as: CeleXA   40 mg, Daily      DICLOFENAC SODIUM ER PO   150 mg, Daily      finasteride 1 MG tablet  Commonly known as: PROPECIA   1 mg, Daily      fluticasone 50 MCG/ACT nasal spray  Commonly known as: FLONASE   2 sprays, Daily      loratadine 10 MG tablet  Commonly known as: CLARITIN   10 mg, Daily      magnesium oxide 400 MG tablet  Commonly known as: MAG-OX   400 mg, Daily      Methocarbamol 1000 MG tablet   1,000 mg, Oral, 3 Times Daily PRN      minocycline 100 MG capsule  Commonly known as: MINOCIN,DYNACIN   100 mg, 2 Times Daily      minoxidil 2.5 MG tablet  Commonly known as: LONITEN   2.5 mg, Daily      multivitamin with minerals tablet tablet   1 tablet, Daily      omeprazole 20 MG capsule  Commonly known as: priLOSEC   20 mg, Daily      ondansetron ODT 4 MG disintegrating tablet  Commonly known as: ZOFRAN-ODT   4 mg, Oral, Every  8 Hours PRN      oxyCODONE-acetaminophen  MG per tablet  Commonly known as: PERCOCET   1 tablet, Every 8 Hours PRN      Potassium Chloride 40 MEQ/15ML (20%) solution   40 mEq, Daily      spironolactone 50 MG tablet  Commonly known as: ALDACTONE   50 mg, 2 Times Daily      tamsulosin 0.4 MG capsule 24 hr capsule  Commonly known as: FLOMAX   1 capsule, 2 Times Daily             Activity: Avoid bending lifting or twisting, no driving while taking narcotic pain medication, walk 15 minutes 4 times daily  Diet: regular diet  Wound Care: keep wound clean and dry  Other: Contact [facility] office with questions or concerns    Follow-up with Dr Iyer or PA's in 2 weeks.    Electronically signed by Primo Avila PA-C 07:51 CDT 5/22/2025

## 2025-05-22 NOTE — PLAN OF CARE
Goal Outcome Evaluation:  Plan of Care Reviewed With: patient        Progress: no change  Outcome Evaluation: A&Ox4. Dressing to back, cdi. RA. SCD. Up x1 w/lso to RR. Prn pain med.

## 2025-05-22 NOTE — PLAN OF CARE
Goal Outcome Evaluation:           Progress: improving          Patient alert and responsive able to make needs known. Dressing changed before discharge. All lines and drains removed prior to discharge. All discharge instructions reviewed with patient and wife, verbalized understanding. There was no changes to current medication.   IV dose of vancomycin given prior to discharge.TLSO brace on with ambulation. Safety maintained. All personal belongings sent home with patient. No acute distress noted.

## 2025-05-22 NOTE — DISCHARGE INSTR - ACTIVITY
Activity: Avoid bending lifting or twisting, no driving while taking narcotic pain medication, walk 15 minutes 4 times daily  Diet: regular diet  Wound Care: keep wound clean and dry  Other: Contact [facility] office with questions or concerns

## 2025-05-23 DIAGNOSIS — T81.49XA SURGICAL WOUND INFECTION: Primary | ICD-10-CM

## 2025-05-23 LAB
BACTERIA SPEC AEROBE CULT: ABNORMAL
GRAM STN SPEC: ABNORMAL

## 2025-05-23 RX ORDER — SULFAMETHOXAZOLE AND TRIMETHOPRIM 800; 160 MG/1; MG/1
1 TABLET ORAL 2 TIMES DAILY
Qty: 28 TABLET | Refills: 0 | Status: SHIPPED | OUTPATIENT
Start: 2025-05-23

## 2025-05-23 NOTE — PAYOR COMM NOTE
"REF:  [id number]     [facility]  FAX  [phone]     Viviane Ley (59 y.o. Male)       Date of Birth   1965    Social Security Number       Address   [address] [place] [address]    Home Phone   [phone]    MRN   [id number]       Worship   [employer]    Marital Status                               Admission Date   5/21/2025    Admission Type   Elective    Admitting Provider   PAWEL Iyer MD    Attending Provider       Department, Room/Bed   [facility] 3A, 356/1       Discharge Date   5/22/2025    Discharge Disposition   Home or Self Care    Discharge Destination                                 Attending Provider: (none)   Allergies: No Known Allergies    Isolation: None   Infection: None   Code Status: Prior    Ht: 183 cm (72.05\")   Wt: 106 kg (234 lb 2.1 oz)    Admission Cmt: None   Principal Problem: Surgical wound infection [T81.49XA]                   Active Insurance as of 5/21/2025       Primary Coverage       Payor Plan Insurance Group Employer/Plan Group    [facility] COMMUNITY PLAN [facility] COMMUNITY PLAN [facility]       Payor Plan Address Payor Plan Phone Number Payor Plan Fax Number Effective Dates    [address]   1/1/2025 - None Entered    [facility] [phone]         Subscriber Name Subscriber Birth Date Member ID       VIVIANE LEY 1965 [id number]                     Emergency Contacts        (Rel.) Home Phone Work Phone Mobile Phone    Candi Ley (Spouse) -- -- [phone]    Kourtney Ley (Daughter) -- -- [phone]                 Discharge Summary        Primo Avila PA-C at 05/22/25 0751       Attestation signed by PAWEL Iyer MD at 05/22/25 0801      I have reviewed this documentation and agree.                      NIMO Avila PA-C  DISCHARGE SUMMARY  Patient ID:  Viviane Ley  [id number]  59 y.o.  1965    Admit date: " 5/21/2025    Discharge date and time: 5/22/2025    Admitting Physician: PAWEL Iyer MD     Indication for Admission: Surgical wound infection with drainage    Admission Diagnoses: Surgical wound infection [T81.49XA]    Discharge Diagnoses: Surgical wound infection [T81.49XA]    Procedures: I & D lumbar wound    Problem List:   Surgical wound infection    Surgical site infection      Consults: none    Admission Condition: fair    Discharged Condition: stable    Hospital Course: procedure completed without incident     Disposition: home    Patient Instructions:      Discharge Medications        Continue These Medications        Instructions Start Date   Advair Diskus 100-50 MCG/ACT DISKUS  Generic drug: Fluticasone-Salmeterol   1 puff, 2 Times Daily - RT      albuterol sulfate  (90 Base) MCG/ACT inhaler  Commonly known as: PROVENTIL HFA;VENTOLIN HFA;PROAIR HFA   2 puffs, As Needed      citalopram 40 MG tablet  Commonly known as: CeleXA   40 mg, Daily      DICLOFENAC SODIUM ER PO   150 mg, Daily      finasteride 1 MG tablet  Commonly known as: PROPECIA   1 mg, Daily      fluticasone 50 MCG/ACT nasal spray  Commonly known as: FLONASE   2 sprays, Daily      loratadine 10 MG tablet  Commonly known as: CLARITIN   10 mg, Daily      magnesium oxide 400 MG tablet  Commonly known as: MAG-OX   400 mg, Daily      Methocarbamol 1000 MG tablet   1,000 mg, Oral, 3 Times Daily PRN      minocycline 100 MG capsule  Commonly known as: MINOCIN,DYNACIN   100 mg, 2 Times Daily      minoxidil 2.5 MG tablet  Commonly known as: LONITEN   2.5 mg, Daily      multivitamin with minerals tablet tablet   1 tablet, Daily      omeprazole 20 MG capsule  Commonly known as: priLOSEC   20 mg, Daily      ondansetron ODT 4 MG disintegrating tablet  Commonly known as: ZOFRAN-ODT   4 mg, Oral, Every 8 Hours PRN      oxyCODONE-acetaminophen  MG per tablet  Commonly known as: PERCOCET   1 tablet, Every 8 Hours PRN      Potassium Chloride  40 MEQ/15ML (20%) solution   40 mEq, Daily      spironolactone 50 MG tablet  Commonly known as: ALDACTONE   50 mg, 2 Times Daily      tamsulosin 0.4 MG capsule 24 hr capsule  Commonly known as: FLOMAX   1 capsule, 2 Times Daily             Activity: Avoid bending lifting or twisting, no driving while taking narcotic pain medication, walk 15 minutes 4 times daily  Diet: regular diet  Wound Care: keep wound clean and dry  Other: Contact [facility] office with questions or concerns    Follow-up with Dr Iyer or PA's in 2 weeks.    Electronically signed by Primo Avila PA-C 07:51 CDT 5/22/2025      Electronically signed by PAWEL Iyer MD at 05/22/25 0801       Discharge Order (From admission, onward)       Start     Ordered    05/22/25 0752  Discharge patient  Once        Expected Discharge Date: 05/22/25   Expected Discharge Time: Afternoon   Discharge Disposition: Home or Self Care   Physician of Record for Attribution - Please select from Treatment Team: PAWEL IYER [[id number]]   Review needed by CMO to determine Physician of Record: No      Question Answer Comment   Physician of Record for Attribution - Please select from Treatment Team PAWEL IYER    Review needed by CMO to determine Physician of Record No        05/22/25 0751

## 2025-05-26 LAB
BACTERIA SPEC ANAEROBE CULT: NORMAL

## 2025-05-28 LAB
FUNGUS WND CULT: NORMAL
MYCOBACTERIUM SPEC CULT: NORMAL
NIGHT BLUE STAIN TISS: NORMAL
NIGHT BLUE STAIN TISS: NORMAL

## 2025-06-25 LAB
MYCOBACTERIUM SPEC CULT: NORMAL
NIGHT BLUE STAIN TISS: NORMAL
NIGHT BLUE STAIN TISS: NORMAL

## 2025-06-30 LAB
FUNGUS WND CULT: NORMAL

## 2025-07-01 LAB
MYCOBACTERIUM SPEC CULT: NORMAL
NIGHT BLUE STAIN TISS: NORMAL
NIGHT BLUE STAIN TISS: NORMAL

## (undated) DEVICE — ELECTRD BLD EZ CLN MOD XLNG 2.75IN

## (undated) DEVICE — APPL CHLORAPREP HI/LITE 26ML ORNG

## (undated) DEVICE — SPNG GZ WOVN 4X4IN 12PLY 10/BX STRL

## (undated) DEVICE — PK SPINE POST 30

## (undated) DEVICE — TRAP FLD MINIVAC MEGADYNE 100ML

## (undated) DEVICE — ANTIBACTERIAL UNDYED BRAIDED (POLYGLACTIN 910), SYNTHETIC ABSORBABLE SUTURE: Brand: COATED VICRYL

## (undated) DEVICE — DRP C/ARMOR

## (undated) DEVICE — SIGMA LTP BLADE EXTENDER, NARROW, 26MM: Brand: SIGMA

## (undated) DEVICE — PREVENA PEEL & PLACE SYSTEM KIT- 13 CM: Brand: PREVENA™ PEEL & PLACE™

## (undated) DEVICE — GOWN,SIRUS,NONRNF,SETINSLV,XL,20/CS: Brand: MEDLINE

## (undated) DEVICE — SUT SILK 0 SUTUPAK TIES 24IN SA76G

## (undated) DEVICE — YANKAUER,OPEN TIP,W/O VENT,STERILE: Brand: MEDLINE INDUSTRIES, INC.

## (undated) DEVICE — GLV SURG GRN DERMASSURE LF PF 7.5

## (undated) DEVICE — TR BAND RADIAL ARTERY COMPRESSION DEVICE: Brand: TR BAND

## (undated) DEVICE — GLV SURG DERMASSURE GRN LF PF 8.0

## (undated) DEVICE — MANIFLD WAST MGMT SYS NEPTUNE2 4PT

## (undated) DEVICE — THE STERILE THE STERIS STERILE CAMERA HANDLE COVERS ARE DESIGNED FOR HARMONYAIR 4K CAMERA MODULE, AND PROVIDE STERILE CONTROL THAT ALLOW FOR INCREASING AND DECREASING ILLUMINATION THROUGH SEVEN INTENSITY LEVELS.

## (undated) DEVICE — 4-PORT MANIFOLD: Brand: NEPTUNE 2

## (undated) DEVICE — ELECTRD BLD EZ CLN STD 6.5IN

## (undated) DEVICE — CANNULATED EXTENDED TAB POLYAXIAL REDUCTION SCREW, 6.5 MM X 50 MM
Type: IMPLANTABLE DEVICE | Site: SPINE LUMBAR | Status: NON-FUNCTIONAL
Brand: INVICTUS
Removed: 2025-05-02

## (undated) DEVICE — YANKAUER SUCTION INSTRUMENT WITHOUT CONTROL VENT, OPEN TIP, CLEAR: Brand: YANKAUER

## (undated) DEVICE — BIPOLAR SEALER 23-113-1 AQM 2.3 OM NEURO: Brand: AQUAMANTYS ®

## (undated) DEVICE — CATH IV ANGIO FEP 12G 3IN LTBLU 10PK

## (undated) DEVICE — SAFEOP 3 STIMULATING CLIP, STERILE: Brand: SAFEOP 3

## (undated) DEVICE — SUT PDS 0 CTX 36IN VIO PDP370T

## (undated) DEVICE — SUT SILK 4/0 SUTUPAK TIES 24IN SA73H

## (undated) DEVICE — DRAPE,UTILITY,TAPE,15X26,STERILE: Brand: MEDLINE

## (undated) DEVICE — GW PERIPH GUIDERIGHT STD/J/TP PTFE/PCOAT SS 0.038IN 5X150CM

## (undated) DEVICE — GLV SURG SENSICARE W/ALOE PF LF 7.5 STRL

## (undated) DEVICE — CLTH CLENS READYCLEANSE PERI CARE PK/5

## (undated) DEVICE — THE STERILE LIGHT HANDLE COVER IS USED WITH STERIS SURGICAL LIGHTING AND VISUALIZATION SYSTEMS.

## (undated) DEVICE — TROCAR TIP, STAINLESS STEEL GUIDEWIRE, 310MM: Brand: LIF-PTP

## (undated) DEVICE — SPK10277 JACKSON/PRO-AXIS KIT: Brand: SPK10277 JACKSON/PRO-AXIS KIT

## (undated) DEVICE — DRP C/ARM W/BAND W/CLIPS 41X74IN

## (undated) DEVICE — SUT SILK 2/0 SH 75CM 30IN BLK C016D

## (undated) DEVICE — PATIENT RETURN ELECTRODE, SINGLE-USE, CONTACT QUALITY MONITORING, ADULT, WITH 9FT CORD, FOR PATIENTS WEIGING OVER 33LBS. (15KG): Brand: MEGADYNE

## (undated) DEVICE — SUT MNCRYL 4/0 PS2 27IN UD MCP426H

## (undated) DEVICE — NDL TP BVL JAMSHIDI ACC GUIDE ARCUS

## (undated) DEVICE — SAFEOP 3 STIMULATING BALL-TIP PROBE, STERILE: Brand: SAFEOP 3

## (undated) DEVICE — Device

## (undated) DEVICE — SAFEOP 3 PTP/LTP ELECTRODE KIT - EMG/SSEP - NEEDLE: Brand: SAFEOP 3

## (undated) DEVICE — TROCAR TIP NITINOL GUIDEWIRE 18": Brand: INVICTUS

## (undated) DEVICE — SPONGE,LAP,12"X12",XR,ST,5/PK,40PK/CS: Brand: MEDLINE

## (undated) DEVICE — GLV SURG BIOGEL LTX PF 6 1/2

## (undated) DEVICE — GW PERIPH GUIDERIGHT STD/EXCHNG/J/TIP SS 0.038IN 5X260CM

## (undated) DEVICE — 1LYRTR 16FR10ML100%SIL UMS SNP: Brand: MEDLINE INDUSTRIES, INC.

## (undated) DEVICE — SUT PDS 2/0 CT1 27IN VIL PDP339H

## (undated) DEVICE — SUT SILK 3/0 SUTUPAK TIES 24IN SA74H

## (undated) DEVICE — TP SXN YANKR OPNTP

## (undated) DEVICE — PTP ACCESS PUSHER CAP: Brand: LIF-PTP

## (undated) DEVICE — SUT ETHLN 2/0 FS 18IN 664H

## (undated) DEVICE — SUT PROLN 5/0 C1 DA 24IN 8725H

## (undated) DEVICE — PACK,SET UP,NO DRAPES: Brand: MEDLINE

## (undated) DEVICE — CVR UNIV C/ARM

## (undated) DEVICE — CATH DIAG IMPULSE M/ PK 145 5FR

## (undated) DEVICE — SAFEOP 3 THORACOLUMBAR ELECTRODE KIT - EMG/SSEP/MEP - NEEDLE: Brand: SAFEOP 3

## (undated) DEVICE — LIF ILLUMINATION SYSTEM, STERILE: Brand: ATEC LIGHT CABLE SYSTEM

## (undated) DEVICE — SAFEOP STIMULATING DILATOR KIT, STERILE - OVAL: Brand: SAFEOP

## (undated) DEVICE — TAPE,CLOTH/SILK,CURAD,3"X10YD,LF,40/CS: Brand: CURAD

## (undated) DEVICE — DRSNG GZ CURAD XEROFORM NONADHS 5X9IN STRL

## (undated) DEVICE — GLV SURG BIOGEL LTX PF 7 1/2

## (undated) DEVICE — GLIDESHEATH SLENDER STAINLESS STEEL KIT: Brand: GLIDESHEATH SLENDER

## (undated) DEVICE — PK SPINE LAT 30

## (undated) DEVICE — DRP SURG UTIL W/TP 15X26IN STRL

## (undated) DEVICE — SUT SILK 2/0 SUTUPAK TIES 24IN SA75H

## (undated) DEVICE — ELECTRODE,RT,STRESS,FOAM,50PK: Brand: MEDLINE

## (undated) DEVICE — HANDPIECE SET WITH HIGH FLOW TIP AND SUCTION TUBE: Brand: INTERPULSE

## (undated) DEVICE — SAFEOP 3 THORACOLUMBAR ELECTRODE KIT - EMG/SSEP - NEEDLE: Brand: SAFEOP 3

## (undated) DEVICE — PINNACLE INTRODUCER SHEATH: Brand: PINNACLE

## (undated) DEVICE — CONN FLX BREATHE CIRCT

## (undated) DEVICE — SPONGE,DISSECTOR,K,XRAY,9/16"X1/4",STRL: Brand: MEDLINE

## (undated) DEVICE — DRSNG SURESITE WNDW 4X4.5

## (undated) DEVICE — KT TBL OSI PRO AXIS JACKSN TORSO W/ PRONESAFE

## (undated) DEVICE — PK CATH LAB 60

## (undated) DEVICE — SIGMA LTP INTRADISCAL SHIM, NARROW: Brand: SIGMA